# Patient Record
Sex: MALE | Race: WHITE | Employment: OTHER | URBAN - NONMETROPOLITAN AREA
[De-identification: names, ages, dates, MRNs, and addresses within clinical notes are randomized per-mention and may not be internally consistent; named-entity substitution may affect disease eponyms.]

---

## 2017-11-10 ENCOUNTER — HOSPITAL ENCOUNTER (OUTPATIENT)
Dept: NUCLEAR MEDICINE | Age: 62
Discharge: HOME | End: 2017-11-10
Payer: COMMERCIAL

## 2017-11-10 VITALS — WEIGHT: 235 LBS

## 2017-11-10 DIAGNOSIS — R10.11 ABDOMINAL PAIN, RIGHT UPPER QUADRANT: ICD-10-CM

## 2017-11-10 PROCEDURE — A9537 TC99M MEBROFENIN: HCPCS | Performed by: NURSE PRACTITIONER

## 2017-11-10 PROCEDURE — 2580000003 HC RX 258: Performed by: RADIOLOGY

## 2017-11-10 PROCEDURE — 6360000004 HC RX CONTRAST MEDICATION: Performed by: RADIOLOGY

## 2017-11-10 PROCEDURE — 3430000000 HC RX DIAGNOSTIC RADIOPHARMACEUTICAL: Performed by: NURSE PRACTITIONER

## 2017-11-10 PROCEDURE — 78227 HEPATOBIL SYST IMAGE W/DRUG: CPT

## 2017-11-10 RX ADMIN — SODIUM CHLORIDE 2.13 MCG: 9 INJECTION, SOLUTION INTRAVENOUS at 10:42

## 2017-11-10 RX ADMIN — Medication 8.6 MILLICURIE: at 09:40

## 2017-12-04 ENCOUNTER — OFFICE VISIT (OUTPATIENT)
Dept: SURGERY | Age: 62
End: 2017-12-04
Payer: COMMERCIAL

## 2017-12-04 VITALS
OXYGEN SATURATION: 95 % | HEART RATE: 92 BPM | DIASTOLIC BLOOD PRESSURE: 76 MMHG | HEIGHT: 71 IN | SYSTOLIC BLOOD PRESSURE: 120 MMHG | BODY MASS INDEX: 33.32 KG/M2 | WEIGHT: 238 LBS | TEMPERATURE: 95.4 F | RESPIRATION RATE: 18 BRPM

## 2017-12-04 DIAGNOSIS — K80.10 CALCULUS OF GALLBLADDER WITH CHRONIC CHOLECYSTITIS WITHOUT OBSTRUCTION: ICD-10-CM

## 2017-12-04 DIAGNOSIS — E83.110 HEREDITARY HEMOCHROMATOSIS (HCC): ICD-10-CM

## 2017-12-04 DIAGNOSIS — R94.8 ABNORMAL BILIARY HIDA SCAN: ICD-10-CM

## 2017-12-04 PROCEDURE — 3017F COLORECTAL CA SCREEN DOC REV: CPT | Performed by: SURGERY

## 2017-12-04 PROCEDURE — G8484 FLU IMMUNIZE NO ADMIN: HCPCS | Performed by: SURGERY

## 2017-12-04 PROCEDURE — G8419 CALC BMI OUT NRM PARAM NOF/U: HCPCS | Performed by: SURGERY

## 2017-12-04 PROCEDURE — G8427 DOCREV CUR MEDS BY ELIG CLIN: HCPCS | Performed by: SURGERY

## 2017-12-04 PROCEDURE — 99202 OFFICE O/P NEW SF 15 MIN: CPT | Performed by: SURGERY

## 2017-12-04 PROCEDURE — 1036F TOBACCO NON-USER: CPT | Performed by: SURGERY

## 2017-12-04 RX ORDER — CETIRIZINE HYDROCHLORIDE 10 MG/1
10 TABLET ORAL DAILY
COMMUNITY

## 2017-12-04 RX ORDER — ALLOPURINOL 300 MG/1
300 TABLET ORAL DAILY
COMMUNITY

## 2017-12-04 RX ORDER — MONTELUKAST SODIUM 10 MG/1
10 TABLET ORAL NIGHTLY
COMMUNITY

## 2017-12-04 RX ORDER — LISINOPRIL 10 MG/1
10 TABLET ORAL DAILY
Status: ON HOLD | COMMUNITY
End: 2021-03-27 | Stop reason: HOSPADM

## 2017-12-04 RX ORDER — PANTOPRAZOLE SODIUM 40 MG/1
40 GRANULE, DELAYED RELEASE ORAL
Status: ON HOLD | COMMUNITY
End: 2020-08-16 | Stop reason: HOSPADM

## 2017-12-04 ASSESSMENT — ENCOUNTER SYMPTOMS
COLOR CHANGE: 0
BACK PAIN: 0
ABDOMINAL PAIN: 1
EYE REDNESS: 0
COUGH: 0
CHOKING: 0
NAUSEA: 0
CHEST TIGHTNESS: 0
RECTAL PAIN: 0
EYE PAIN: 0
CONSTIPATION: 0
VOICE CHANGE: 0
EYE ITCHING: 0
APNEA: 0
VOMITING: 0
RHINORRHEA: 0
SHORTNESS OF BREATH: 1
BLOOD IN STOOL: 0
STRIDOR: 0
PHOTOPHOBIA: 0
SINUS PAIN: 0
DIARRHEA: 1
EYE DISCHARGE: 0
SORE THROAT: 0
FACIAL SWELLING: 0
TROUBLE SWALLOWING: 0
WHEEZING: 0
ABDOMINAL DISTENTION: 1
ANAL BLEEDING: 0
SINUS PRESSURE: 0

## 2017-12-04 NOTE — PROGRESS NOTES
Aura Galvan MD [facility]  General Surgery  New Patient Evaluation in Office  Pt Name: Rachel Nur  Date of Birth 1955   Today's Date: 12/4/2017  Medical Record Number: [id number]  Referring Provider: Memo Nur NP  Primary Care Provider: Torin Izaguirre  Chief Complaint:  Chief Complaint   Patient presents with   [facility] Surgical Consult     new patient--ref by Joanna Valles for cholelithiasis     ASSESSMENT      Problem List Items Addressed This Visit     Hereditary hemochromatosis ([address]    Calculus of gallbladder with chronic cholecystitis without obstruction    Abnormal biliary HIDA scan      Other Visit Diagnoses    None. Past Medical History:   Diagnosis Date    Diabetes ([address]     GERD (gastroesophageal reflux disease)     Gout     Hyperlipidemia     Hypertension     Seasonal allergies     Sleep apnea           PLANS      1. Schedule Gilma Newton for Nothing at this time  2. His symptoms don't sound like gallbladder  3. In reviewing Dr. Robison note after his EGD he did not think the symptoms were consistent with gallbladder and made some changes to his diet. 4. During the patient's HIDA scan he had no symptoms at all during the ejection fraction portion so no symptoms reproduced during HIDA ejection fraction  5. The patient does relate that he thinks his symptoms are slightly better since he gave up drinking diet Pepsi  Orders Placed This Encounter:  No orders of the defined types were placed in this encounter. Orville Richmond is a 57y.o. year old male seen regarding gallbladder. He is referred by Dr. Inocencio Maria for evaluation of cholelithiasis. He's had a long history of elevated liver enzymes dating back to at least 2008. He's had multiple lab testing for etiologies which were all negative for hepatitis and other problems. He does have a fairly significant drinking history. Lithotomy be from his Lipitor back then so that was stopped.   He had a gallbladder ultrasound in June 2017 which revealed cholelithiasis without evidence of cholecystitis. He was complaining of some abdominal pain this, dull achy sensation about 3-4 on a pain scale. The location as the epigastric and right upper quadrant. He'll have days when it's bad and other days when it is not so bad. He does think to eating out at restaurants and foods high in fat especially sood may make it worse. He does have some associated bloating and a little bit more diarrhea. Denies vomiting. He had a HIDA scan done which showed his ejection fraction was 23% he has also been diagnosed with hemachromatosis by Dr. Irma Chawla in [place]. He did undergo an EGD recently which showed mild gastroesophageal reflux disease and gastritis and the impression was they felt he was having functional dyspepsia and at that time they talk to him about they didn't think his symptoms were from his gallbladder even though he had gallstones and a low ejection fraction. There is a him on Protonix and have him cut down his caffeine and change his diet and then reevaluate him in the office in 2 months. They felt that the did change my help in the most from the symptoms he is complaining of. He also had a colonoscopy at that time. LIVER ULTRASOUND WITH DOPPLER ANALYSIS       CLINICAL INFORMATION: Elevated liver enzymes       TECHNIQUE: Multiple sonographic images of the upper abdomen were obtained with specific attention given to the liver. A few images of the gallbladder, pancreas, and right kidney were also obtained. Color Doppler imaging of the main vessels in both lower    performed along with Spectral Doppler analysis.       FINDINGS:        Liver - 19.4 x 16.5 x 16.6 cm   Gallbladder - 7.7 x 3.9 x 2.5 cm   Gallbladder Wall - 0.2 cm   Common Duct - 0.6 cm   Cunningham's Sign: neg       Liver: The liver is normal in size, contour, and echogenicity. No lesions are seen. No abnormally dilated bile ducts are seen.  Color Doppler imaging of the major vessels in discharge, redness, itching and visual disturbance. Respiratory: Positive for shortness of breath. Negative for apnea, cough, choking, chest tightness, wheezing and stridor. Cardiovascular: Negative for chest pain, palpitations and leg swelling. Gastrointestinal: Positive for abdominal distention, abdominal pain and diarrhea. Negative for anal bleeding, blood in stool, constipation, nausea, rectal pain and vomiting. Endocrine: Negative for cold intolerance, heat intolerance, polydipsia, polyphagia and polyuria. Genitourinary: Negative for decreased urine volume, difficulty urinating, discharge, dysuria, enuresis, flank pain, frequency, genital sores, hematuria, penile pain, penile swelling, scrotal swelling, testicular pain and urgency. Musculoskeletal: Negative for arthralgias, back pain, gait problem, joint swelling, myalgias, neck pain and neck stiffness. Skin: Negative for color change, pallor, rash and wound. Allergic/Immunologic: Negative for environmental allergies, food allergies and immunocompromised state. Neurological: Negative for dizziness, tremors, seizures, syncope, facial asymmetry, speech difficulty, weakness, light-headedness, numbness and headaches. Hematological: Negative for adenopathy. Does not bruise/bleed easily. Psychiatric/Behavioral: Negative for agitation, behavioral problems, confusion, decreased concentration, dysphoric mood, hallucinations, self-injury, sleep disturbance and suicidal ideas. The patient is not nervous/anxious and is not hyperactive    OBJECTIVE      VITALS:  height is 5' 11\" (1.803 m) and weight is 238 lb (108 kg). His tympanic temperature is 95.4 °F (35.2 °C). His blood pressure is 120/76 and his pulse is 92. His respiration is 18 and oxygen saturation is 95%. CONSTITUTIONAL: Alert and oriented times 3, no acute distress and cooperative to examination with proper mood and affect.              Electronically signed by Yudy Chong MD on

## 2017-12-04 NOTE — PROGRESS NOTES
Subjective:      Patient ID: Ivette Molina is a 57 y.o. male. Chief Complaint   Patient presents with    Surgical Consult     new patient--ref by Ethan Holguin for cholelithiasis       HPI    Review of Systems   Constitutional: Negative for activity change, appetite change, chills, diaphoresis, fatigue, fever and unexpected weight change. HENT: Negative for congestion, dental problem, drooling, ear discharge, ear pain, facial swelling, hearing loss, mouth sores, nosebleeds, postnasal drip, rhinorrhea, sinus pain, sinus pressure, sneezing, sore throat, tinnitus, trouble swallowing and voice change. Eyes: Negative for photophobia, pain, discharge, redness, itching and visual disturbance. Respiratory: Positive for shortness of breath. Negative for apnea, cough, choking, chest tightness, wheezing and stridor. Cardiovascular: Negative for chest pain, palpitations and leg swelling. Gastrointestinal: Positive for abdominal distention, abdominal pain and diarrhea. Negative for anal bleeding, blood in stool, constipation, nausea, rectal pain and vomiting. Endocrine: Negative for cold intolerance, heat intolerance, polydipsia, polyphagia and polyuria. Genitourinary: Negative for decreased urine volume, difficulty urinating, discharge, dysuria, enuresis, flank pain, frequency, genital sores, hematuria, penile pain, penile swelling, scrotal swelling, testicular pain and urgency. Musculoskeletal: Negative for arthralgias, back pain, gait problem, joint swelling, myalgias, neck pain and neck stiffness. Skin: Negative for color change, pallor, rash and wound. Allergic/Immunologic: Negative for environmental allergies, food allergies and immunocompromised state. Neurological: Negative for dizziness, tremors, seizures, syncope, facial asymmetry, speech difficulty, weakness, light-headedness, numbness and headaches. Hematological: Negative for adenopathy. Does not bruise/bleed easily.

## 2017-12-04 NOTE — LETTER
.    [facility]  Lucrecia Barron MD FACS  Phone- [phone]  Fax [phone]    Pt Name: Chris Ragsdale Record Number: [id number]  Date of Birth 1955   Today's Date: 12/4/2017    Deng Grimaldo and Marcelle Chiang was evaluated in the office today. My assessment and plans are listed below. Assessment:     Dae Laguerre was seen today for surgical consult. Diagnoses and all orders for this visit:    Hereditary hemochromatosis ([address]    Calculus of gallbladder with chronic cholecystitis without obstruction    Abnormal biliary HIDA scan       Plan:     1. Schedule Dae Laguerre for Nothing at this time  2. His symptoms don't sound like gallbladder  3. In reviewing Dr. Timbo Oro note after his EGD he did not think the symptoms were consistent with gallbladder and made some changes to his diet. 4. During the patient's HIDA scan he had no symptoms at all during the ejection fraction portion so no symptoms reproduced during HIDA ejection fraction  5. The patient does relate that he thinks his symptoms are slightly better since he gave up drinking diet Pepsi  If I can provide any additional assistance or you have any concerns, please feel free to contact me. Thank you for allowing to participate in the care of your patients. Sincerely,      Lucrecia Barron MD FACS  [id number]  [facility], [address]  Office: [phone]  Fax: [phone]

## 2018-03-26 ENCOUNTER — HOSPITAL ENCOUNTER (OUTPATIENT)
Dept: CT IMAGING | Age: 63
Discharge: HOME | End: 2018-03-26
Payer: COMMERCIAL

## 2018-03-26 DIAGNOSIS — R94.8 NONSPECIFIC ABNORMAL RESULTS OF BASAL METABOLISM FUNCTION STUDY: ICD-10-CM

## 2018-03-26 DIAGNOSIS — R10.11 ABDOMINAL PAIN, RIGHT UPPER QUADRANT: ICD-10-CM

## 2018-03-26 DIAGNOSIS — R94.5 ABNORMAL RESULTS OF LIVER FUNCTION STUDIES: ICD-10-CM

## 2018-03-26 DIAGNOSIS — E83.118: ICD-10-CM

## 2018-03-26 DIAGNOSIS — K80.60 CALCULUS OF GALLBLADDER AND BILE DUCT WITH CHOLECYSTITIS WITHOUT OBSTRUCTION, UNSPECIFIED CHOLECYSTITIS ACUITY: ICD-10-CM

## 2018-03-26 DIAGNOSIS — R79.89 HYPOURICEMIA: ICD-10-CM

## 2018-03-26 PROCEDURE — 74177 CT ABD & PELVIS W/CONTRAST: CPT

## 2018-03-26 PROCEDURE — 6360000004 HC RX CONTRAST MEDICATION: Performed by: INTERNAL MEDICINE

## 2018-03-26 RX ADMIN — IOHEXOL 50 ML: 240 INJECTION, SOLUTION INTRATHECAL; INTRAVASCULAR; INTRAVENOUS; ORAL at 09:19

## 2018-03-26 RX ADMIN — IOPAMIDOL 100 ML: 755 INJECTION, SOLUTION INTRAVENOUS at 09:19

## 2020-07-28 ENCOUNTER — HOSPITAL ENCOUNTER (OUTPATIENT)
Age: 65
Discharge: HOME | End: 2020-07-28
Payer: MEDICARE

## 2020-07-28 LAB
ALBUMIN SERPL-MCNC: 4.1 G/DL (ref 3.5–5.1)
ALP BLD-CCNC: 114 U/L (ref 38–126)
ALT SERPL-CCNC: 79 U/L (ref 11–66)
AST SERPL-CCNC: 94 U/L (ref 5–40)
BILIRUB SERPL-MCNC: 0.6 MG/DL (ref 0.3–1.2)
BILIRUBIN DIRECT: < 0.2 MG/DL (ref 0–0.3)
HBV SURFACE AB TITR SER: NEGATIVE {TITER}
HEPATITIS B SURFACE ANTIGEN: NEGATIVE
TOTAL PROTEIN: 7.1 G/DL (ref 6.1–8)

## 2020-07-28 PROCEDURE — 80076 HEPATIC FUNCTION PANEL: CPT

## 2020-07-28 PROCEDURE — 82784 ASSAY IGA/IGD/IGG/IGM EACH: CPT

## 2020-07-28 PROCEDURE — 36415 COLL VENOUS BLD VENIPUNCTURE: CPT

## 2020-07-28 PROCEDURE — 87340 HEPATITIS B SURFACE AG IA: CPT

## 2020-07-28 PROCEDURE — 86707 HEPATITIS BE ANTIBODY: CPT

## 2020-07-28 PROCEDURE — 86704 HEP B CORE ANTIBODY TOTAL: CPT

## 2020-07-28 PROCEDURE — 86706 HEP B SURFACE ANTIBODY: CPT

## 2020-07-28 PROCEDURE — 83516 IMMUNOASSAY NONANTIBODY: CPT

## 2020-07-28 PROCEDURE — 86708 HEPATITIS A ANTIBODY: CPT

## 2020-07-29 LAB — IGA: 543 MG/DL (ref 70–400)

## 2020-07-30 ENCOUNTER — HOSPITAL ENCOUNTER (OUTPATIENT)
Dept: CT IMAGING | Age: 65
Discharge: HOME | End: 2020-07-30
Payer: MEDICARE

## 2020-07-30 LAB
CREATININE, WHOLE BLOOD: 0.8 MG/DL (ref 0.5–1.2)
ESTIMATED GFR, PCACC: > 90 ML/MIN/1.73M2
GLIADIN PEPTIDE IGA: 2.3 U/ML
GLIADIN PEPTIDE IGG: 0.9 U/ML

## 2020-07-30 PROCEDURE — 74160 CT ABDOMEN W/CONTRAST: CPT

## 2020-07-30 PROCEDURE — 6360000004 HC RX CONTRAST MEDICATION: Performed by: NURSE PRACTITIONER

## 2020-07-30 PROCEDURE — 82565 ASSAY OF CREATININE: CPT

## 2020-07-30 RX ADMIN — IOPAMIDOL 100 ML: 755 INJECTION, SOLUTION INTRAVENOUS at 16:41

## 2020-07-30 RX ADMIN — IOHEXOL 50 ML: 240 INJECTION, SOLUTION INTRATHECAL; INTRAVASCULAR; INTRAVENOUS; ORAL at 16:41

## 2020-07-31 LAB
F-ACTIN AB IGG: 11 UNITS (ref 0–19)
HAV AB SERPL IA-ACNC: NEGATIVE
HEPATITIS B CORE TOTAL ANTIBODY: NEGATIVE
HEPATITIS BE ANTIBODY: NEGATIVE
MITOCHONDRIAL ANTIBODY: 17.7 UNITS (ref 0–20)
TISSUE TRANSGLUTAMINASE ANTIBODY: 2 U/ML (ref 0–5)
TISSUE TRANSGLUTAMINASE IGA: 2 U/ML (ref 0–3)

## 2020-08-11 ENCOUNTER — APPOINTMENT (OUTPATIENT)
Dept: GENERAL RADIOLOGY | Age: 65
DRG: 432 | End: 2020-08-11
Payer: MEDICARE

## 2020-08-11 ENCOUNTER — HOSPITAL ENCOUNTER (INPATIENT)
Age: 65
LOS: 5 days | Discharge: HOME | DRG: 432 | End: 2020-08-16
Attending: EMERGENCY MEDICINE | Admitting: FAMILY MEDICINE
Payer: MEDICARE

## 2020-08-11 PROBLEM — K92.2 GI HEMORRHAGE: Status: ACTIVE | Noted: 2020-08-11

## 2020-08-11 LAB
ABO: NORMAL
ALBUMIN SERPL-MCNC: 3.1 GM/DL (ref 3.4–5)
ALP BLD-CCNC: 94 U/L (ref 46–116)
ALT SERPL-CCNC: 70 U/L (ref 14–63)
ANION GAP: 14 MEQ/L (ref 8–16)
ANTIBODY SCREEN: NORMAL
APTT: 28.5 SECONDS (ref 22–38)
AST SERPL-CCNC: 50 U/L (ref 15–37)
BASOPHILS # BLD: 0.4 % (ref 0–3)
BILIRUB SERPL-MCNC: 1.1 MG/DL (ref 0.2–1)
BUN BLDV-MCNC: 25 MG/DL (ref 7–18)
CHLORIDE BLD-SCNC: 106 MEQ/L (ref 98–107)
CO2: 22 MEQ/L (ref 21–32)
CREAT SERPL-MCNC: 1.2 MG/DL (ref 0.6–1.3)
EKG ATRIAL RATE: 127 BPM
EKG P AXIS: 46 DEGREES
EKG P-R INTERVAL: 158 MS
EKG Q-T INTERVAL: 304 MS
EKG QRS DURATION: 86 MS
EKG QTC CALCULATION (BAZETT): 441 MS
EKG R AXIS: 9 DEGREES
EKG T AXIS: 57 DEGREES
EKG VENTRICULAR RATE: 127 BPM
EOSINOPHILS RELATIVE PERCENT: 1.3 % (ref 0–4)
GFR, ESTIMATED: 64 ML/MIN/1.73M2
GLUCOSE BLD-MCNC: 230 MG/DL (ref 74–106)
HCT VFR BLD CALC: 29.9 % (ref 42–52)
HCT VFR BLD CALC: 38.4 % (ref 42–52)
HEMOCCULT STL QL: POSITIVE
HEMOGLOBIN: 12.9 GM/DL (ref 14–18)
HEMOGLOBIN: 9.2 GM/DL (ref 14–18)
INR BLD: 1.29 (ref 0.85–1.13)
LYMPHOCYTES # BLD: 13.3 % (ref 15–47)
MAGNESIUM: 1.7 MG/DL (ref 1.8–2.4)
MCH RBC QN AUTO: 32.3 PG (ref 27–31)
MCHC RBC AUTO-ENTMCNC: 33.7 GM/DL (ref 33–37)
MCV RBC AUTO: 96.1 FL (ref 80–94)
MONOCYTES: 7.9 % (ref 0–12)
MRSA SCREEN RT-PCR: NEGATIVE
PDW BLD-RTO: 14.6 % (ref 11.5–14.5)
PLATELET # BLD: 133 THOU/MM3 (ref 130–400)
PMV BLD AUTO: 9.1 FL (ref 7.4–10.4)
POC CALCIUM: 8.8 MG/DL (ref 8.5–10.1)
POTASSIUM SERPL-SCNC: 4.7 MEQ/L (ref 3.5–5.1)
RBC # BLD: 4 MILL/MM3 (ref 4.7–6.1)
RH FACTOR: NORMAL
SEGS: 77.1 % (ref 43–75)
SODIUM BLD-SCNC: 142 MEQ/L (ref 136–145)
TOTAL PROTEIN: 6.3 GM/DL (ref 6.4–8.2)
TROPONIN I: < 0.017 NG/ML (ref 0.02–0.05)
VANCOMYCIN RESISTANT ENTEROCOCCUS: NEGATIVE
WBC # BLD: 10.2 THOU/MM3 (ref 4.8–10.8)

## 2020-08-11 PROCEDURE — 2720000010 HC SURG SUPPLY STERILE: Performed by: INTERNAL MEDICINE

## 2020-08-11 PROCEDURE — 6360000002 HC RX W HCPCS: Performed by: PHYSICIAN ASSISTANT

## 2020-08-11 PROCEDURE — C9113 INJ PANTOPRAZOLE SODIUM, VIA: HCPCS | Performed by: EMERGENCY MEDICINE

## 2020-08-11 PROCEDURE — 99223 1ST HOSP IP/OBS HIGH 75: CPT | Performed by: INTERNAL MEDICINE

## 2020-08-11 PROCEDURE — 85610 PROTHROMBIN TIME: CPT

## 2020-08-11 PROCEDURE — 02HV33Z INSERTION OF INFUSION DEVICE INTO SUPERIOR VENA CAVA, PERCUTANEOUS APPROACH: ICD-10-PCS | Performed by: INTERNAL MEDICINE

## 2020-08-11 PROCEDURE — 99223 1ST HOSP IP/OBS HIGH 75: CPT | Performed by: PHYSICIAN ASSISTANT

## 2020-08-11 PROCEDURE — 36430 TRANSFUSION BLD/BLD COMPNT: CPT

## 2020-08-11 PROCEDURE — 94761 N-INVAS EAR/PLS OXIMETRY MLT: CPT

## 2020-08-11 PROCEDURE — 36556 INSERT NON-TUNNEL CV CATH: CPT | Performed by: NURSE PRACTITIONER

## 2020-08-11 PROCEDURE — 2709999900 HC NON-CHARGEABLE SUPPLY: Performed by: INTERNAL MEDICINE

## 2020-08-11 PROCEDURE — 36415 COLL VENOUS BLD VENIPUNCTURE: CPT

## 2020-08-11 PROCEDURE — 84484 ASSAY OF TROPONIN QUANT: CPT

## 2020-08-11 PROCEDURE — 87081 CULTURE SCREEN ONLY: CPT

## 2020-08-11 PROCEDURE — 3609013000 HC EGD TRANSORAL CONTROL BLEEDING ANY METHOD: Performed by: INTERNAL MEDICINE

## 2020-08-11 PROCEDURE — 87500 VANOMYCIN DNA AMP PROBE: CPT

## 2020-08-11 PROCEDURE — 2500000003 HC RX 250 WO HCPCS

## 2020-08-11 PROCEDURE — 71045 X-RAY EXAM CHEST 1 VIEW: CPT

## 2020-08-11 PROCEDURE — 6360000002 HC RX W HCPCS: Performed by: NURSE PRACTITIONER

## 2020-08-11 PROCEDURE — 85014 HEMATOCRIT: CPT

## 2020-08-11 PROCEDURE — 2000000000 HC ICU R&B

## 2020-08-11 PROCEDURE — 36556 INSERT NON-TUNNEL CV CATH: CPT

## 2020-08-11 PROCEDURE — 2580000003 HC RX 258: Performed by: EMERGENCY MEDICINE

## 2020-08-11 PROCEDURE — 6360000002 HC RX W HCPCS: Performed by: INTERNAL MEDICINE

## 2020-08-11 PROCEDURE — 93005 ELECTROCARDIOGRAM TRACING: CPT | Performed by: EMERGENCY MEDICINE

## 2020-08-11 PROCEDURE — 2580000003 HC RX 258: Performed by: PHYSICIAN ASSISTANT

## 2020-08-11 PROCEDURE — 5A1945Z RESPIRATORY VENTILATION, 24-96 CONSECUTIVE HOURS: ICD-10-PCS | Performed by: INTERNAL MEDICINE

## 2020-08-11 PROCEDURE — 86850 RBC ANTIBODY SCREEN: CPT

## 2020-08-11 PROCEDURE — 87086 URINE CULTURE/COLONY COUNT: CPT

## 2020-08-11 PROCEDURE — 85730 THROMBOPLASTIN TIME PARTIAL: CPT

## 2020-08-11 PROCEDURE — 85025 COMPLETE CBC W/AUTO DIFF WBC: CPT

## 2020-08-11 PROCEDURE — C9113 INJ PANTOPRAZOLE SODIUM, VIA: HCPCS | Performed by: PHYSICIAN ASSISTANT

## 2020-08-11 PROCEDURE — 31500 INSERT EMERGENCY AIRWAY: CPT

## 2020-08-11 PROCEDURE — 87641 MR-STAPH DNA AMP PROBE: CPT

## 2020-08-11 PROCEDURE — 83735 ASSAY OF MAGNESIUM: CPT

## 2020-08-11 PROCEDURE — 86923 COMPATIBILITY TEST ELECTRIC: CPT

## 2020-08-11 PROCEDURE — 2500000003 HC RX 250 WO HCPCS: Performed by: NURSE PRACTITIONER

## 2020-08-11 PROCEDURE — P9016 RBC LEUKOCYTES REDUCED: HCPCS

## 2020-08-11 PROCEDURE — 0W3P8ZZ CONTROL BLEEDING IN GASTROINTESTINAL TRACT, VIA NATURAL OR ARTIFICIAL OPENING ENDOSCOPIC: ICD-10-PCS | Performed by: INTERNAL MEDICINE

## 2020-08-11 PROCEDURE — 86900 BLOOD TYPING SEROLOGIC ABO: CPT

## 2020-08-11 PROCEDURE — 2580000003 HC RX 258: Performed by: INTERNAL MEDICINE

## 2020-08-11 PROCEDURE — 85018 HEMOGLOBIN: CPT

## 2020-08-11 PROCEDURE — 82272 OCCULT BLD FECES 1-3 TESTS: CPT

## 2020-08-11 PROCEDURE — 2580000003 HC RX 258: Performed by: NURSE PRACTITIONER

## 2020-08-11 PROCEDURE — 96374 THER/PROPH/DIAG INJ IV PUSH: CPT

## 2020-08-11 PROCEDURE — 86901 BLOOD TYPING SEROLOGIC RH(D): CPT

## 2020-08-11 PROCEDURE — 96375 TX/PRO/DX INJ NEW DRUG ADDON: CPT

## 2020-08-11 PROCEDURE — 80053 COMPREHEN METABOLIC PANEL: CPT

## 2020-08-11 PROCEDURE — 94002 VENT MGMT INPAT INIT DAY: CPT

## 2020-08-11 PROCEDURE — 0BH17EZ INSERTION OF ENDOTRACHEAL AIRWAY INTO TRACHEA, VIA NATURAL OR ARTIFICIAL OPENING: ICD-10-PCS | Performed by: INTERNAL MEDICINE

## 2020-08-11 PROCEDURE — 31500 INSERT EMERGENCY AIRWAY: CPT | Performed by: NURSE PRACTITIONER

## 2020-08-11 PROCEDURE — 2709999900 HC NON-CHARGEABLE SUPPLY

## 2020-08-11 PROCEDURE — 6360000002 HC RX W HCPCS: Performed by: EMERGENCY MEDICINE

## 2020-08-11 PROCEDURE — 99283 EMERGENCY DEPT VISIT LOW MDM: CPT

## 2020-08-11 PROCEDURE — 6360000002 HC RX W HCPCS

## 2020-08-11 RX ORDER — ONDANSETRON 2 MG/ML
8 INJECTION INTRAMUSCULAR; INTRAVENOUS ONCE
Status: COMPLETED | OUTPATIENT
Start: 2020-08-11 | End: 2020-08-11

## 2020-08-11 RX ORDER — SODIUM CHLORIDE 0.9 % (FLUSH) 0.9 %
10 SYRINGE (ML) INJECTION PRN
Status: DISCONTINUED | OUTPATIENT
Start: 2020-08-11 | End: 2020-08-16 | Stop reason: HOSPADM

## 2020-08-11 RX ORDER — PANTOPRAZOLE SODIUM 40 MG/10ML
40 INJECTION, POWDER, LYOPHILIZED, FOR SOLUTION INTRAVENOUS DAILY
Status: DISCONTINUED | OUTPATIENT
Start: 2020-08-14 | End: 2020-08-11

## 2020-08-11 RX ORDER — PANTOPRAZOLE SODIUM 40 MG/10ML
40 INJECTION, POWDER, LYOPHILIZED, FOR SOLUTION INTRAVENOUS DAILY
Status: DISCONTINUED | OUTPATIENT
Start: 2020-08-11 | End: 2020-08-11

## 2020-08-11 RX ORDER — DEXTROSE MONOHYDRATE 25 G/50ML
12.5 INJECTION, SOLUTION INTRAVENOUS PRN
Status: DISCONTINUED | OUTPATIENT
Start: 2020-08-11 | End: 2020-08-16 | Stop reason: HOSPADM

## 2020-08-11 RX ORDER — DEXTROSE MONOHYDRATE 50 MG/ML
100 INJECTION, SOLUTION INTRAVENOUS PRN
Status: DISCONTINUED | OUTPATIENT
Start: 2020-08-11 | End: 2020-08-16 | Stop reason: HOSPADM

## 2020-08-11 RX ORDER — SODIUM CHLORIDE 9 MG/ML
10 INJECTION INTRAVENOUS DAILY
Status: DISCONTINUED | OUTPATIENT
Start: 2020-08-14 | End: 2020-08-11 | Stop reason: ALTCHOICE

## 2020-08-11 RX ORDER — LISINOPRIL 10 MG/1
10 TABLET ORAL DAILY
Status: DISCONTINUED | OUTPATIENT
Start: 2020-08-12 | End: 2020-08-16 | Stop reason: HOSPADM

## 2020-08-11 RX ORDER — PROMETHAZINE HYDROCHLORIDE 25 MG/1
12.5 TABLET ORAL EVERY 6 HOURS PRN
Status: DISCONTINUED | OUTPATIENT
Start: 2020-08-11 | End: 2020-08-16 | Stop reason: HOSPADM

## 2020-08-11 RX ORDER — 0.9 % SODIUM CHLORIDE 0.9 %
20 INTRAVENOUS SOLUTION INTRAVENOUS ONCE
Status: COMPLETED | OUTPATIENT
Start: 2020-08-11 | End: 2020-08-12

## 2020-08-11 RX ORDER — ACETAMINOPHEN 650 MG/1
650 SUPPOSITORY RECTAL EVERY 6 HOURS PRN
Status: DISCONTINUED | OUTPATIENT
Start: 2020-08-11 | End: 2020-08-16 | Stop reason: HOSPADM

## 2020-08-11 RX ORDER — FENTANYL CITRATE 50 UG/ML
INJECTION, SOLUTION INTRAMUSCULAR; INTRAVENOUS
Status: COMPLETED
Start: 2020-08-11 | End: 2020-08-11

## 2020-08-11 RX ORDER — OCTREOTIDE ACETATE 50 UG/ML
50 INJECTION, SOLUTION INTRAVENOUS; SUBCUTANEOUS ONCE
Status: COMPLETED | OUTPATIENT
Start: 2020-08-11 | End: 2020-08-11

## 2020-08-11 RX ORDER — 0.9 % SODIUM CHLORIDE 0.9 %
1000 INTRAVENOUS SOLUTION INTRAVENOUS ONCE
Status: COMPLETED | OUTPATIENT
Start: 2020-08-11 | End: 2020-08-11

## 2020-08-11 RX ORDER — PROPOFOL 10 MG/ML
INJECTION, EMULSION INTRAVENOUS
Status: COMPLETED
Start: 2020-08-11 | End: 2020-08-11

## 2020-08-11 RX ORDER — CETIRIZINE HYDROCHLORIDE 10 MG/1
10 TABLET ORAL DAILY
Status: DISCONTINUED | OUTPATIENT
Start: 2020-08-12 | End: 2020-08-16 | Stop reason: HOSPADM

## 2020-08-11 RX ORDER — PROPOFOL 10 MG/ML
10 INJECTION, EMULSION INTRAVENOUS
Status: DISCONTINUED | OUTPATIENT
Start: 2020-08-11 | End: 2020-08-13

## 2020-08-11 RX ORDER — NICOTINE POLACRILEX 4 MG
15 LOZENGE BUCCAL PRN
Status: DISCONTINUED | OUTPATIENT
Start: 2020-08-11 | End: 2020-08-16 | Stop reason: HOSPADM

## 2020-08-11 RX ORDER — SODIUM CHLORIDE 9 MG/ML
INJECTION, SOLUTION INTRAVENOUS CONTINUOUS
Status: DISCONTINUED | OUTPATIENT
Start: 2020-08-11 | End: 2020-08-14

## 2020-08-11 RX ORDER — ACETAMINOPHEN 325 MG/1
650 TABLET ORAL EVERY 6 HOURS PRN
Status: DISCONTINUED | OUTPATIENT
Start: 2020-08-11 | End: 2020-08-16 | Stop reason: HOSPADM

## 2020-08-11 RX ORDER — ONDANSETRON 2 MG/ML
4 INJECTION INTRAMUSCULAR; INTRAVENOUS EVERY 6 HOURS PRN
Status: DISCONTINUED | OUTPATIENT
Start: 2020-08-11 | End: 2020-08-16 | Stop reason: HOSPADM

## 2020-08-11 RX ORDER — KETAMINE HCL IN NACL, ISO-OSM 100MG/10ML
SYRINGE (ML) INJECTION
Status: COMPLETED
Start: 2020-08-11 | End: 2020-08-11

## 2020-08-11 RX ORDER — 0.9 % SODIUM CHLORIDE 0.9 %
250 INTRAVENOUS SOLUTION INTRAVENOUS ONCE
Status: COMPLETED | OUTPATIENT
Start: 2020-08-11 | End: 2020-08-11

## 2020-08-11 RX ORDER — SODIUM CHLORIDE 0.9 % (FLUSH) 0.9 %
10 SYRINGE (ML) INJECTION EVERY 12 HOURS SCHEDULED
Status: DISCONTINUED | OUTPATIENT
Start: 2020-08-11 | End: 2020-08-16 | Stop reason: HOSPADM

## 2020-08-11 RX ORDER — DULAGLUTIDE 0.75 MG/.5ML
1.75 INJECTION, SOLUTION SUBCUTANEOUS WEEKLY
COMMUNITY
End: 2021-04-19

## 2020-08-11 RX ORDER — ALLOPURINOL 300 MG/1
300 TABLET ORAL DAILY
Status: DISCONTINUED | OUTPATIENT
Start: 2020-08-12 | End: 2020-08-16 | Stop reason: HOSPADM

## 2020-08-11 RX ORDER — ATORVASTATIN CALCIUM 10 MG/1
10 TABLET, FILM COATED ORAL NIGHTLY
COMMUNITY

## 2020-08-11 RX ORDER — MONTELUKAST SODIUM 10 MG/1
10 TABLET ORAL NIGHTLY
Status: DISCONTINUED | OUTPATIENT
Start: 2020-08-11 | End: 2020-08-16 | Stop reason: HOSPADM

## 2020-08-11 RX ADMIN — OCTREOTIDE ACETATE 50 MCG/HR: 500 INJECTION, SOLUTION INTRAVENOUS; SUBCUTANEOUS at 23:30

## 2020-08-11 RX ADMIN — SODIUM CHLORIDE 250 ML: 9 INJECTION, SOLUTION INTRAVENOUS at 23:15

## 2020-08-11 RX ADMIN — FENTANYL CITRATE 50 MCG: 50 INJECTION, SOLUTION INTRAMUSCULAR; INTRAVENOUS at 21:46

## 2020-08-11 RX ADMIN — Medication 100 MG: at 21:13

## 2020-08-11 RX ADMIN — Medication 2 MCG/KG/MIN: at 23:00

## 2020-08-11 RX ADMIN — PROPOFOL 50 MCG/KG/MIN: 10 INJECTION, EMULSION INTRAVENOUS at 22:12

## 2020-08-11 RX ADMIN — ONDANSETRON 8 MG: 2 INJECTION INTRAMUSCULAR; INTRAVENOUS at 15:56

## 2020-08-11 RX ADMIN — PANTOPRAZOLE SODIUM 40 MG: 40 INJECTION, POWDER, FOR SOLUTION INTRAVENOUS at 15:56

## 2020-08-11 RX ADMIN — FENTANYL CITRATE 50 MCG: 50 INJECTION, SOLUTION INTRAMUSCULAR; INTRAVENOUS at 21:38

## 2020-08-11 RX ADMIN — SODIUM CHLORIDE 1000 ML: 9 INJECTION, SOLUTION INTRAVENOUS at 22:14

## 2020-08-11 RX ADMIN — SODIUM CHLORIDE 1000 ML: 9 INJECTION, SOLUTION INTRAVENOUS at 15:50

## 2020-08-11 RX ADMIN — OCTREOTIDE ACETATE 50 MCG: 50 INJECTION, SOLUTION INTRAVENOUS; SUBCUTANEOUS at 23:30

## 2020-08-11 RX ADMIN — SODIUM CHLORIDE: 9 INJECTION, SOLUTION INTRAVENOUS at 23:29

## 2020-08-11 RX ADMIN — SODIUM CHLORIDE 8 MG/HR: 9 INJECTION, SOLUTION INTRAVENOUS at 22:02

## 2020-08-11 RX ADMIN — PROPOFOL 40 MCG/KG/MIN: 10 INJECTION, EMULSION INTRAVENOUS at 23:35

## 2020-08-11 RX ADMIN — Medication 75 MCG/HR: at 22:10

## 2020-08-11 RX ADMIN — SODIUM CHLORIDE 80 MG: 9 INJECTION, SOLUTION INTRAVENOUS at 20:22

## 2020-08-11 RX ADMIN — SODIUM CHLORIDE, PRESERVATIVE FREE 10 ML: 5 INJECTION INTRAVENOUS at 23:43

## 2020-08-11 RX ADMIN — SODIUM CHLORIDE 20 ML: 9 INJECTION, SOLUTION INTRAVENOUS at 23:15

## 2020-08-11 ASSESSMENT — ENCOUNTER SYMPTOMS
COUGH: 0
VOMITING: 1
SHORTNESS OF BREATH: 0
NAUSEA: 1
WHEEZING: 0
ABDOMINAL PAIN: 0
HEARTBURN: 0

## 2020-08-11 ASSESSMENT — PAIN SCALES - GENERAL
PAINLEVEL_OUTOF10: 0
PAINLEVEL_OUTOF10: 0

## 2020-08-11 ASSESSMENT — PULMONARY FUNCTION TESTS: PIF_VALUE: 20

## 2020-08-11 NOTE — PROGRESS NOTES
Transfer  Report from  [address]  Referring Physician   Dr. Jacob Colindres  Accepting Physician  Dr. Brian Samayoa  Patient Condition:     Joshua Gale  1955 is patient of Dr. Ronald Solis at [facility]. Patient was weak and dizzy yesterday, today 2 black tarry stools and 1 episode of vomiting blood. Stool occult positive. INR 1.29, Trop -, BUN 25, Vitals 97.8, 127, 18, 132/67 after fluids now 103/56, 98% RA. Dr Jacob Colindres recommended Stepdown, she felt the HMG was erroneously high.  Admit, Inpatient, Stepdown, GI Bleed

## 2020-08-11 NOTE — ED TRIAGE NOTES
Patient into ED with concerns of 1 episodes of throwing up blood and 2 black stools today. Patient stated stopped taking Protonix 4 days ago. Denies abdominal pain.

## 2020-08-11 NOTE — ED PROVIDER NOTES
[facility]  eMERGENCY dEPARTMENT eNCOUnter             [address]    CHIEF COMPLAINT      I am vomiting blood. Nurses Notes reviewed and I agree except as noted in the HPI. HPI    Jennifer Chino is a 75 y.o. male who presents stating that since yesterday, he has felt weak, lightheaded, and mildly nauseated. Today, he had 2 black stools fairly close together. These were large and loose. Just prior to arrival, he became nauseated and vomited \"quite a bit of blood \". He still has mild nausea, but denies abdominal pain. He has never had anything like this before. He had upper and lower endoscopy \"a few years ago \", and was told that he \"just has some inflammation\". He was placed on Protonix, which he actually stopped taking 4 days ago. He has a history of hemochromatosis, and has not required phlebotomy for \"at least 3 years \". He has a hematologist in [facility]. REVIEW OF SYSTEMS      Review of Systems   Constitutional: Positive for malaise/fatigue. Negative for diaphoresis and fever. HENT: Negative. Respiratory: Negative for cough, shortness of breath and wheezing. Cardiovascular: Negative for chest pain, palpitations and leg swelling. Gastrointestinal: Positive for melena, nausea and vomiting. Negative for abdominal pain and heartburn. Genitourinary: Negative. Neurological: Positive for dizziness and weakness. Negative for focal weakness, loss of consciousness and headaches. Psychiatric/Behavioral: The patient is nervous/anxious. All other systems reviewed and are negative. PAST MEDICAL HISTORY     has a past medical history of Diabetes ([address] GERD (gastroesophageal reflux disease), Gout, Hyperlipidemia, Hypertension, Seasonal allergies, and Sleep apnea. SURGICAL HISTORY     has a past surgical history that includes Colonoscopy (04/2015); cervical fusion (2004); and Upper gastrointestinal endoscopy (11/2017).     CURRENT MEDICATIONS    Current Discharge Medication List      CONTINUE these medications which have NOT CHANGED    Details   Dulaglutide (TRULICITY) 6.47 TC/4.8YN SOPN Inject 1.75 mg into the skin once a week      lisinopril (PRINIVIL;ZESTRIL) 10 MG tablet Take 10 mg by mouth daily      metFORMIN (GLUCOPHAGE) 1000 MG tablet Take 1,000 mg by mouth 2 times daily (with meals)      montelukast (SINGULAIR) 10 MG tablet Take 10 mg by mouth nightly      pantoprazole sodium (PROTONIX) 40 MG PACK packet Take 40 mg by mouth every morning (before breakfast)      aspirin 81 MG tablet Take 81 mg by mouth daily      cetirizine (ZYRTEC) 10 MG tablet Take 10 mg by mouth daily      Misc Natural Products (OSTEO BI-FLEX ADV JOINT SHIELD PO) Take by mouth daily      Multiple Vitamins-Minerals (MULTIVITAMIN ADULT PO) Take 1 tablet by mouth daily      allopurinol (ZYLOPRIM) 300 MG tablet Take 300 mg by mouth daily             ALLERGIES    is allergic to sulfa antibiotics. FAMILY HISTORY    He indicated that his mother is . He indicated that his father is . He indicated that his brother is alive. family history includes Cancer in his father; Heart Disease in his brother and mother; [place] in his father. SOCIAL HISTORY     reports that he has quit smoking. He has never used smokeless tobacco. He reports current alcohol use. He reports that he does not use drugs. PHYSICAL EXAM       INITIAL VITALS: BP (!) 111/58   Pulse 129   Temp 98.7 °F (37.1 °C) (Oral)   Resp 18   Ht 6' (1.829 m)   Wt 226 lb 9.6 oz (102.8 kg)   SpO2 95%   BMI 30.73 kg/m²        Physical Exam  Vitals signs and nursing note reviewed. Exam conducted with a chaperone present. Constitutional:       General: He is in acute distress. Appearance: He is obese. HENT:      Nose: Nose normal.      Mouth/Throat:      Mouth: Mucous membranes are moist.      Pharynx: Oropharynx is clear. No posterior oropharyngeal erythema.    Eyes: Conjunctiva/sclera: Conjunctivae normal.      Pupils: Pupils are equal, round, and reactive to light. Neck:      Musculoskeletal: Neck supple. Cardiovascular:      Rate and Rhythm: Tachycardia present. Heart sounds: No murmur. Pulmonary:      Effort: Pulmonary effort is normal. No respiratory distress. Breath sounds: Normal breath sounds. Abdominal:      General: Bowel sounds are normal.      Palpations: Abdomen is soft. There is no mass. Tenderness: There is no abdominal tenderness. Genitourinary:     Comments: No rectal mass, smear black, heme positive stool  Musculoskeletal:         General: No swelling or tenderness. Lymphadenopathy:      Cervical: No cervical adenopathy. Skin:     General: Skin is warm and dry. Coloration: Skin is jaundiced. Neurological:      General: No focal deficit present. Mental Status: He is alert and oriented to person, place, and time. DIAGNOSTIC RESULTS    EKG     Sinus tachycardia, low voltage QRS, no acute pattern of infarct or ischemia. RADIOLOGY:    XR CHEST PORTABLE   Final Result   Probable mild left basilar subsegmental atelectasis. **This report has been created using voice recognition software. It may contain minor errors which are inherent in voice recognition technology. **      Final report electronically signed by Dr. Eben Mendoza MD on 8/11/2020 4:22 PM           LABS:     Labs Reviewed   CBC WITH AUTO DIFFERENTIAL - Abnormal; Notable for the following components:       Result Value    RBC 4.00 (*)     Hemoglobin 12.9 (*)     Hematocrit 38.4 (*)     MCV 96.1 (*)     MCH 32.3 (*)     RDW 14.6 (*)     SEGS 77.1 (*)     Lymphocytes 13.3 (*)     All other components within normal limits   COMPREHENSIVE METABOLIC PANEL - Abnormal; Notable for the following components:    Glucose 230 (*)     BUN 25 (*)     AST 50 (*)     Total Protein 6.3 (*)     Alb 3.1 (*)     Total Bilirubin 1.1 (*)     ALT 70 (*)     All other components within normal limits   MAGNESIUM - Abnormal; Notable for the following components:    Magnesium 1.7 (*)     All other components within normal limits   PROTIME - Abnormal; Notable for the following components:    INR 1.29 (*)     All other components within normal limits   GLOMERULAR FILTRATION RATE, ESTIMATED - Abnormal; Notable for the following components:    GFR, Estimated 64 (*)     All other components within normal limits   APTT   BLOOD OCCULT STOOL SCREEN #1   TROPONIN   ANION GAP   TYPE AND SCREEN       Vitals:    Vitals:    08/11/20 1655 08/11/20 1704 08/11/20 1725 08/11/20 1830   BP: (!) 102/55 100/68 104/62 (!) 111/58   Pulse: 127 130 117 129   Resp: 20 18 17 18   Temp:    98.7 °F (37.1 °C)   TempSrc:    Oral   SpO2: 97% 97% 98% 95%   Weight:    226 lb 9.6 oz (102.8 kg)   Height:    6' (1.829 m)       EMERGENCY DEPARTMENT COURSE:    IV fluid bolus, saline 1 L. IV Zofran and Protonix given. He feels lightheaded and dizzy, relieved by lying down. Current blood pressure 103/56, heart rate 126. Per old records from his primary care, labs on 5/27/2020 showed hemoglobin 15.4, hematocrit 45.5. I arranged for admission to [address] [place] [address], bed assignment delayed due to no beds. He started to feel more lightheaded and weak, and BP went down ot 94/60, . O2, additional fluids, second IV raised BP to 457 systolic, and he felt better. I called [address] [place] [address] again, and this time bed available, emergent transfer via EMS arranged. CRITICAL CARE:     30 minutes      FINAL IMPRESSION      1. Gastrointestinal hemorrhage, unspecified gastrointestinal hemorrhage type    2. Hypotension due to hypovolemia        DISPOSITION/PLAN    DISPOSITION Admitted 08/11/2020 04:30:43 PM to [address] [place] [address], Dr. Isaac Trevino.        PATIENT REFERRED TO:    Collins Robles  [address] [place] [address]  [address]  [phone]              (Please note that portions of this note were completed with a voice recognition program. Efforts were made to edit the dictations but occasionally words are mis-transcribed.)      Rishabh Blanco MD  08/11/20 3420

## 2020-08-12 ENCOUNTER — APPOINTMENT (OUTPATIENT)
Dept: ULTRASOUND IMAGING | Age: 65
DRG: 432 | End: 2020-08-12
Payer: MEDICARE

## 2020-08-12 LAB
ANION GAP SERPL CALCULATED.3IONS-SCNC: 9 MEQ/L (ref 8–16)
ANION GAP SERPL CALCULATED.3IONS-SCNC: 9 MEQ/L (ref 8–16)
ATYPICAL LYMPHOCYTES: ABNORMAL %
BASOPHILS # BLD: 0.2 %
BASOPHILS ABSOLUTE: 0 THOU/MM3 (ref 0–0.1)
BUN BLDV-MCNC: 37 MG/DL (ref 7–22)
BUN BLDV-MCNC: 47 MG/DL (ref 7–22)
CALCIUM SERPL-MCNC: 7.1 MG/DL (ref 8.5–10.5)
CALCIUM SERPL-MCNC: 7.4 MG/DL (ref 8.5–10.5)
CHLORIDE BLD-SCNC: 115 MEQ/L (ref 98–111)
CHLORIDE BLD-SCNC: 116 MEQ/L (ref 98–111)
CHLORIDE, URINE: 98 MEQ/L
CO2: 16 MEQ/L (ref 23–33)
CO2: 17 MEQ/L (ref 23–33)
CREAT SERPL-MCNC: 1 MG/DL (ref 0.4–1.2)
CREAT SERPL-MCNC: 1.1 MG/DL (ref 0.4–1.2)
DIFFERENTIAL TYPE: ABNORMAL
EOSINOPHIL # BLD: 0.1 %
EOSINOPHILS ABSOLUTE: 0 THOU/MM3 (ref 0–0.4)
ERYTHROCYTE [DISTWIDTH] IN BLOOD BY AUTOMATED COUNT: 16.1 % (ref 11.5–14.5)
ERYTHROCYTE [DISTWIDTH] IN BLOOD BY AUTOMATED COUNT: 58.1 FL (ref 35–45)
FERRITIN: 167 NG/ML (ref 22–322)
GFR SERPL CREATININE-BSD FRML MDRD: 67 ML/MIN/1.73M2
GFR SERPL CREATININE-BSD FRML MDRD: 75 ML/MIN/1.73M2
GLUCOSE BLD-MCNC: 184 MG/DL (ref 70–108)
GLUCOSE BLD-MCNC: 270 MG/DL (ref 70–108)
GLUCOSE BLD-MCNC: 276 MG/DL (ref 70–108)
GLUCOSE BLD-MCNC: 278 MG/DL (ref 70–108)
GLUCOSE BLD-MCNC: 292 MG/DL (ref 70–108)
GLUCOSE BLD-MCNC: 302 MG/DL (ref 70–108)
GLUCOSE BLD-MCNC: 305 MG/DL (ref 70–108)
GLUCOSE BLD-MCNC: 323 MG/DL (ref 70–108)
HCT VFR BLD CALC: 23.7 % (ref 42–52)
HCT VFR BLD CALC: 26.8 % (ref 42–52)
HCT VFR BLD CALC: 28.7 % (ref 42–52)
HCT VFR BLD CALC: 29.9 % (ref 42–52)
HCT VFR BLD CALC: 30.3 % (ref 42–52)
HEMOGLOBIN: 7.4 GM/DL (ref 14–18)
HEMOGLOBIN: 8.6 GM/DL (ref 14–18)
HEMOGLOBIN: 9.2 GM/DL (ref 14–18)
HEMOGLOBIN: 9.3 GM/DL (ref 14–18)
HEMOGLOBIN: 9.4 GM/DL (ref 14–18)
IMMATURE GRANS (ABS): 0.13 THOU/MM3 (ref 0–0.07)
IMMATURE GRANULOCYTES: 0.6 %
IRON: 241 UG/DL (ref 65–195)
LYMPHOCYTES # BLD: 12.1 %
LYMPHOCYTES ABSOLUTE: 2.5 THOU/MM3 (ref 1–4.8)
MAGNESIUM: 1.6 MG/DL (ref 1.6–2.4)
MCH RBC QN AUTO: 31 PG (ref 26–33)
MCHC RBC AUTO-ENTMCNC: 31.1 GM/DL (ref 32.2–35.5)
MCV RBC AUTO: 99.7 FL (ref 80–94)
MONOCYTES # BLD: 11.7 %
MONOCYTES ABSOLUTE: 2.4 THOU/MM3 (ref 0.4–1.3)
NUCLEATED RED BLOOD CELLS: 0 /100 WBC
PATHOLOGIST REVIEW: ABNORMAL
PH UA: 5 (ref 5–9)
PHOSPHORUS: 2 MG/DL (ref 2.4–4.7)
PLATELET # BLD: 193 THOU/MM3 (ref 130–400)
PLATELET ESTIMATE: ADEQUATE
PMV BLD AUTO: 11.8 FL (ref 9.4–12.4)
POTASSIUM REFLEX MAGNESIUM: 5.5 MEQ/L (ref 3.5–5.2)
POTASSIUM SERPL-SCNC: 4.2 MEQ/L (ref 3.5–5.2)
POTASSIUM, URINE: 47 MEQ/L
RBC # BLD: 3 MILL/MM3 (ref 4.7–6.1)
SCAN OF BLOOD SMEAR: NORMAL
SEG NEUTROPHILS: 75.3 %
SEGMENTED NEUTROPHILS ABSOLUTE COUNT: 15.5 THOU/MM3 (ref 1.8–7.7)
SODIUM BLD-SCNC: 140 MEQ/L (ref 135–145)
SODIUM BLD-SCNC: 142 MEQ/L (ref 135–145)
SODIUM URINE: 62 MEQ/L
TOTAL IRON BINDING CAPACITY: < 258 UG/DL (ref 171–450)
WBC # BLD: 20.6 THOU/MM3 (ref 4.8–10.8)

## 2020-08-12 PROCEDURE — 84300 ASSAY OF URINE SODIUM: CPT

## 2020-08-12 PROCEDURE — 76705 ECHO EXAM OF ABDOMEN: CPT

## 2020-08-12 PROCEDURE — 2000000000 HC ICU R&B

## 2020-08-12 PROCEDURE — 84133 ASSAY OF URINE POTASSIUM: CPT

## 2020-08-12 PROCEDURE — 6360000002 HC RX W HCPCS: Performed by: PHYSICIAN ASSISTANT

## 2020-08-12 PROCEDURE — 82436 ASSAY OF URINE CHLORIDE: CPT

## 2020-08-12 PROCEDURE — 2580000003 HC RX 258: Performed by: NURSE PRACTITIONER

## 2020-08-12 PROCEDURE — C9113 INJ PANTOPRAZOLE SODIUM, VIA: HCPCS | Performed by: PHYSICIAN ASSISTANT

## 2020-08-12 PROCEDURE — 2500000003 HC RX 250 WO HCPCS: Performed by: NURSE PRACTITIONER

## 2020-08-12 PROCEDURE — 2580000003 HC RX 258: Performed by: PHYSICIAN ASSISTANT

## 2020-08-12 PROCEDURE — 85018 HEMOGLOBIN: CPT

## 2020-08-12 PROCEDURE — 83540 ASSAY OF IRON: CPT

## 2020-08-12 PROCEDURE — 6370000000 HC RX 637 (ALT 250 FOR IP): Performed by: NURSE PRACTITIONER

## 2020-08-12 PROCEDURE — 82728 ASSAY OF FERRITIN: CPT

## 2020-08-12 PROCEDURE — 80048 BASIC METABOLIC PNL TOTAL CA: CPT

## 2020-08-12 PROCEDURE — 83550 IRON BINDING TEST: CPT

## 2020-08-12 PROCEDURE — 85025 COMPLETE CBC W/AUTO DIFF WBC: CPT

## 2020-08-12 PROCEDURE — 36415 COLL VENOUS BLD VENIPUNCTURE: CPT

## 2020-08-12 PROCEDURE — 82948 REAGENT STRIP/BLOOD GLUCOSE: CPT

## 2020-08-12 PROCEDURE — 84100 ASSAY OF PHOSPHORUS: CPT

## 2020-08-12 PROCEDURE — 82105 ALPHA-FETOPROTEIN SERUM: CPT

## 2020-08-12 PROCEDURE — 93975 VASCULAR STUDY: CPT

## 2020-08-12 PROCEDURE — 6360000002 HC RX W HCPCS: Performed by: INTERNAL MEDICINE

## 2020-08-12 PROCEDURE — 81003 URINALYSIS AUTO W/O SCOPE: CPT

## 2020-08-12 PROCEDURE — 2580000003 HC RX 258: Performed by: INTERNAL MEDICINE

## 2020-08-12 PROCEDURE — 93010 ELECTROCARDIOGRAM REPORT: CPT | Performed by: INTERNAL MEDICINE

## 2020-08-12 PROCEDURE — 6360000002 HC RX W HCPCS: Performed by: NURSE PRACTITIONER

## 2020-08-12 PROCEDURE — 94761 N-INVAS EAR/PLS OXIMETRY MLT: CPT

## 2020-08-12 PROCEDURE — 83735 ASSAY OF MAGNESIUM: CPT

## 2020-08-12 PROCEDURE — 85014 HEMATOCRIT: CPT

## 2020-08-12 PROCEDURE — 94003 VENT MGMT INPAT SUBQ DAY: CPT

## 2020-08-12 RX ORDER — SUCRALFATE 1 G/1
1 TABLET ORAL
Status: DISCONTINUED | OUTPATIENT
Start: 2020-08-12 | End: 2020-08-16

## 2020-08-12 RX ADMIN — INSULIN LISPRO 3 UNITS: 100 INJECTION, SOLUTION INTRAVENOUS; SUBCUTANEOUS at 06:32

## 2020-08-12 RX ADMIN — SODIUM CHLORIDE 8 MG/HR: 9 INJECTION, SOLUTION INTRAVENOUS at 17:54

## 2020-08-12 RX ADMIN — Medication 100 MCG/HR: at 08:19

## 2020-08-12 RX ADMIN — DEXMEDETOMIDINE HYDROCHLORIDE 0.7 MCG/KG/HR: 100 INJECTION, SOLUTION, CONCENTRATE INTRAVENOUS at 07:10

## 2020-08-12 RX ADMIN — SODIUM CHLORIDE: 9 INJECTION, SOLUTION INTRAVENOUS at 05:17

## 2020-08-12 RX ADMIN — SODIUM CHLORIDE: 9 INJECTION, SOLUTION INTRAVENOUS at 20:55

## 2020-08-12 RX ADMIN — INSULIN LISPRO 3 UNITS: 100 INJECTION, SOLUTION INTRAVENOUS; SUBCUTANEOUS at 00:31

## 2020-08-12 RX ADMIN — PROPOFOL 40 MCG/KG/MIN: 10 INJECTION, EMULSION INTRAVENOUS at 03:07

## 2020-08-12 RX ADMIN — INSULIN LISPRO 4 UNITS: 100 INJECTION, SOLUTION INTRAVENOUS; SUBCUTANEOUS at 12:24

## 2020-08-12 RX ADMIN — Medication 175 MCG/HR: at 01:05

## 2020-08-12 RX ADMIN — ONDANSETRON 4 MG: 2 INJECTION INTRAMUSCULAR; INTRAVENOUS at 15:05

## 2020-08-12 RX ADMIN — Medication 22 MCG/MIN: at 08:34

## 2020-08-12 RX ADMIN — SODIUM CHLORIDE, PRESERVATIVE FREE 10 ML: 5 INJECTION INTRAVENOUS at 11:19

## 2020-08-12 RX ADMIN — Medication 175 MCG/HR: at 04:00

## 2020-08-12 RX ADMIN — SODIUM CHLORIDE 8 MG/HR: 9 INJECTION, SOLUTION INTRAVENOUS at 08:18

## 2020-08-12 RX ADMIN — CEFTRIAXONE SODIUM 1 G: 1 INJECTION, POWDER, FOR SOLUTION INTRAMUSCULAR; INTRAVENOUS at 10:58

## 2020-08-12 RX ADMIN — OCTREOTIDE ACETATE 50 MCG/HR: 500 INJECTION, SOLUTION INTRAVENOUS; SUBCUTANEOUS at 08:18

## 2020-08-12 RX ADMIN — OCTREOTIDE ACETATE 50 MCG/HR: 500 INJECTION, SOLUTION INTRAVENOUS; SUBCUTANEOUS at 17:54

## 2020-08-12 RX ADMIN — DEXMEDETOMIDINE HYDROCHLORIDE 0.2 MCG/KG/HR: 100 INJECTION, SOLUTION, CONCENTRATE INTRAVENOUS at 01:11

## 2020-08-12 ASSESSMENT — ENCOUNTER SYMPTOMS
STRIDOR: 0
EYES NEGATIVE: 1
NAUSEA: 0
NAUSEA: 1
RESPIRATORY NEGATIVE: 1
CONSTIPATION: 0
BACK PAIN: 0
WHEEZING: 0
SORE THROAT: 0
BLOOD IN STOOL: 1
VOMITING: 0
COUGH: 0
SHORTNESS OF BREATH: 0
SINUS PAIN: 0
SINUS PRESSURE: 0
ABDOMINAL DISTENTION: 1
ALLERGIC/IMMUNOLOGIC NEGATIVE: 1
EYE REDNESS: 0
VOMITING: 1
ABDOMINAL PAIN: 0
DIARRHEA: 0

## 2020-08-12 ASSESSMENT — PULMONARY FUNCTION TESTS
PIF_VALUE: 17
PIF_VALUE: 17
PIF_VALUE: 16

## 2020-08-12 ASSESSMENT — PAIN SCALES - GENERAL
PAINLEVEL_OUTOF10: 0

## 2020-08-12 NOTE — PROGRESS NOTES
Patient:  Pau Boo    Unit/Bed:4D-06/006-A  MRN: [id number]   PCP: Hallie Ho  Date of Admission: 8/11/2020    Assessment and Plan(All pulmonary edema, renal failure, PE, and respiratory failure diagnoses are acute in nature unless otherwise specified):    1. Acute Postoperative Pulmonary Insufficiency: (resolved) Required intubation for EGD on 8/11/20. Extubated today, weaned to RA. 2. Upper GI Bleed: 2/2 large bleeding esophageal varices near GEJ, seen on EGD 8/11/20. GI following. S/p successful banding by GI on 8/11/20. Had not had recurrence of melena until this afternoon at approx. 1530. Will follow up Hgb, which had been stable, ranging from 9.2-9.4 after 1 pRBC given overnight. Trend H/H q6h. Continue octreotide and protonix infusions. Carafate 4 times daily per GI, and Rocephin 1g q24h. 3. Hypovolemic Shock: Remains on levophed despite fluid resuscitation and 1 pRBC. Requirements down-trending. Continue to wean as able to maintain MAP >65.  4. Non-Anion Gap Metabolic Acidosis: Improving. CO2 17, up from 16. K 4.2, down from 5.5. Anion gap 9. 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 Held ACEi today, and promoting better glucose control w/ higher dose insulin therapy. 5. Hyperkalemia: Resolved. K 4.2, down from 5.5 this morning. 6. Hyperchloremia: Cl 116 today. Reduce 0.9NS infusion to 100 mL/h.  7. Acute blood loss Anemia: 2/2 upper GI bleed. Repeat Hgb pending as noted above. Monitor H/H q6h.  8. Leukocytosis, fever: Temp ranged 98.3-100.8 last 24h. Continue to monitor. On prophylactic broad coverage w/ Rocephin per GI rec's. Abdomen non-tender. 9. DM type II: Hyperglycemic to 323 today. Increase SSI lispo to high-dose corrective therapy q4h. Given one-time dose of 10 units regular insulin. Holding home DM meds while acutely ill. 10. History of Hemochromatosis: Following outpatient. Reportedly has not required phlebotomy in 3 years.   11. Liver Cirrhosis: Findings on liver ultrasound report (8/12/20) consistent w/ cirrhosis of liver. Portal vein patent on doppler. GI following, appreciate rec's. 12. Hypocalcemia: Corrected Ca 8.1. Monitor daily. 13. Sleep Apnea: Restart noct CPAP given extubation today. Pt alert and able to remove mask himself if required. 14. HTN, history of: Pt currently in hypovolemic shock on pressors. Holding home antihypertensives. 13. Former ETOH abuse: Pt states he quit one month prior to admission. 16. HLD, history of: Holding home statin given elevated liver enzymes. 17. Gout, history of: continue home allopurinol. 18. GERD, history of: protonix infusion as above. 19. Diet: NPO  20. Dispo: ICU    CC:  Upper GI Bleed  HPI: Morgan Bailey is a 75 y.o. male w/ PMH of sleep apnea, HTN, HLD, gout, GERD, diabetes, hemochromatosis. He presented on 8/11/2020 to [facility] as a transfer from [facility] ambulatory care, with weakness, lightheadedness, nausea. Per report he had had 2 large black stools at home. Prior to arriving to the emergency department he was noted to have nausea and vomiting and vomited \"quite a bit of blood\". He continued to have nausea during his stay in the emergency department. He did state he had had an upper and lower GI a few years prior. He also did note that he has a history of hemochromatosis but has not required blood in the last 3 years. He follows with a hematologist in [facility] Upon transfer he initially arrived stepdown. Dr. Inocencio Mejia was consulted to see patient for GI. He did express that he would like patient transferred to ICU and intubated for an upper EGD. Patient was then transferred to the ICU. Patient was intubated and sedated. EGD was performed with 1 clip placed on esophageal varices. Request was for patient to remain intubated for possible follow-up procedure tomorrow. He did have noted hypotension, and was given packed red blood cells.     Today, 8/12/20, the patient was extubated after verbal confirmation that repeat EGD will not be needed. He had no recurrence of bloody BM until the afternoon. Hgb has remained stable, ranging from 9.2-9.4. He received 1 pRBC overnight. He remains on protonix and octreotide infusions. He tolerated extubation to 6L NC well, and was subsequently weaned to RA. He remains on levophed infusion for BP support, requirements downtrending. ROS: Review of Systems   Constitutional: Negative for chills, diaphoresis and fever. HENT: Negative for congestion, sinus pressure, sinus pain and sore throat. Eyes: Negative for redness and visual disturbance. Respiratory: Negative for cough, shortness of breath, wheezing and stridor. Cardiovascular: Negative for chest pain, palpitations and leg swelling. Gastrointestinal: Positive for abdominal distention and blood in stool ACCEL [facility]. One episode at approx. 1530). Negative for abdominal pain, constipation, diarrhea, nausea and vomiting. Genitourinary: Negative for decreased urine volume, dysuria, flank pain and hematuria. Musculoskeletal: Negative for back pain and neck pain. Skin: Negative for pallor, rash and wound. Neurological: Negative for dizziness, speech difficulty, weakness, light-headedness, numbness and headaches. Hematological: Negative for adenopathy. Does not bruise/bleed easily. Psychiatric/Behavioral: Negative for agitation and confusion. The patient is not nervous/anxious.       PMH:  Per HPI  SHX:  Reformed smoker, former ETOH abuse (pt states quit 1 month prior to admission), denies drug use   FHX: Mother: heart disease Father: lung cancer   Allergies: sulfa antibiotics  Medications:     sodium chloride 150 mL/hr at 08/12/20 0517    pantoprozole (PROTONIX) infusion 8 mg/hr (08/12/20 0818)    fentaNYL 5 mcg/ml in 0.9%  ml infusion Stopped (08/12/20 1048)    propofol 20 mcg/kg/min (08/12/20 0515)    octreotide (SANDOSTATIN) infusion 50 mcg/hr (08/12/20 0818)    norepinephrine 12 mcg/min (08/12/20 1527)    dextrose      dexmedetomidine Stopped (08/12/20 1208)      [START ON 8/16/2020] Dulaglutide  1.5 mg Subcutaneous Weekly    cefTRIAXone (ROCEPHIN) IV  1 g Intravenous Q24H    sucralfate  1 g Oral 4x Daily AC & HS    insulin regular  10 Units Intravenous Once    insulin lispro  0-18 Units Subcutaneous Q4H    allopurinol  300 mg Oral Daily    cetirizine  10 mg Oral Daily    [Held by provider] lisinopril  10 mg Oral Daily    montelukast  10 mg Oral Nightly    sodium chloride flush  10 mL Intravenous 2 times per day       Vital Signs:   T: 99.9: P: 105 RR: 22 B/P: 109/54: FiO2: RA: O2 Sat:96: I/O: 3613/750 (+2863) GCS: 13  Body mass index is 31.99 kg/m². [facility]:   Appears stated age, acute on chronically-ill. HEENT:  normocephalic and atraumatic. No scleral icterus. PERR. Neck: supple. No Thyromegaly. Trachea midline. Lungs: clear to auscultation. No retractions. No wheeze/rhonchi. Tachypneic. Unlabored. Cardiac: RRR. No JVD. S1/S2. No murmur. Abdomen: soft. Nontender. Moderately distended. BS active x4 quadrants. Extremities:  No clubbing, cyanosis, or edema x 4. Vasculature: capillary refill < 3 seconds. Palpable 2+ dorsalis pedis pulses. Skin:  warm and dry. Intact. No rash. Psych:  Alert and oriented x3. Follows commands. Nods yes/no appropriately. Affect appropriate. Lymph:  No supraclavicular adenopathy. Neurologic:  No focal deficit. No seizures. Spontaneous movement x4 extremities, equal moderate strength bilaterally. Data: (All radiographs, tracings, PFTs, and imaging are personally viewed and interpreted unless otherwise noted).  Telemetry: Sinus Tach, .  Na 142, K 4.2, Cl 116, CO2 17, BUN 37, Cr 1.1, An Gap 9.0, Glucose 302, Ca 7.4   WBC 20.6, Hgb 9.2, Hct 28.7, Plt 193k    Iron 241, TIBC <258    Liver profile (8/11/20):  Albumin 3.1, Alk Phos 94, ALT 70, AST 50, Bilirubin 1.1, Protein 6.3   Liver ultrasound (8/12/20) report:

## 2020-08-12 NOTE — PROGRESS NOTES
Pt admitted to ICU bed 6 from 3B  Complaints: GI bleed  IV saline infusing 150ml/hr RAC. condition patent and no redness noted. IV site free of infection or infiltration. Vital signs obtained. Assessment and data collection initiated. Oriented to room and call light. Policies and procedures for ICU explained. All questions answered with no further questions at this time. Fall prevent and safety brouchre discussed with patient. Bed locked in lowest position, call light within reach. This nurse, Khang Jack assessed pt and Samantha Senior RN charted it.

## 2020-08-12 NOTE — OP NOTE
[facility] Endoscopy    Patient: Arianna Da Silva  : 1955  Acct#: [de-identified]  Date of evaluation: 2020  Primary Care Physician: Pauly Garcia     Procedure:    [x]EGD    []Enteroscopy    []Biopsy   []Dilation      []PEG    []PEG change    []PEG removal  []Variceal banding    [x]Control of bleeding []Destruction of lesion by [facility]    []Stent Placement  []Foreign Body Removal    []Snare Polypectomy  []Other:     []Aborted:        []Colonoscopy  []Flex Sigmoid []EUS, rectal     []Biopsy   []Snare Polypectomy    []Control of bleeding  []Destruction of lesion by [facility]    []Stent Placement  []Foreign Body Removal    []Dilation    []Other:    []Aborted:   []Tattoo    Indication:  hematemesis    History:  The patient is a 75 y.o.  male admitted 20 for hematemesis and melena. Pt takes NSAID's. He has hemochromatosis. Symptoms began with weakness and nausea yesterday. Hemoglobin dropped 3.7 g/dl in 4 hours. Physical Exam:  VITALS: BP (!) 126/56   Pulse 133   Temp 98.5 °F (36.9 °C) (Oral)   Resp 22   Ht 6' (1.829 m)   Wt 226 lb 9.6 oz (102.8 kg)   SpO2 97%   BMI 30.73 kg/m²   The patient is a 75 y.o.  male in no acute distress. HEAD: Normal cephalic/atraumatic. Extra-occular motions intact bilaterally. NECK: No lymphadenopathy or bruits. CHEST: Rises equally on inspiration. Clear to auscultation bilaterally. CARDIOVASCULAR: Regular rate and rhythm without murmurs, rubs or gallops. ABDOMEN: Soft, nontender, and nondistended with normal bowel sounds. No Hepatosplemomegaly. UPPER EXTREMITIES: no cyanosis, clubbing, or edema. DERM: no rash or jaundice. LOWER EXTREMITIES: no cyanosis, clubbing, or edema. NEURO: Alert and oriented times four. Patient moves all extremities and has gross sensation in all extremities.     ASA: ASA 2 - Patient with mild systemic disease with no functional limitations    MEDICATION: MAC/Propofol/Anesthesia:  Yes     Photo:  Yes  Biopsy:  No      Description of Procedure: The risks and benefits of the procedure were described to the patient or their representative if unable to give consent including but not limited to bleeding, infection, poking a hole someplace requiring surgery to fix it, having a reaction to medication, and death. The patient or their representative if unable to give consent understood these risks and provided informed consent. Airway was assessed and is adequate for the planned sedation. Anesthesia: MAC  Estimated Blood Loss: less than 50   Complications: None  Specimens: Was Not Obtained    Sedation was administered by ICU provider who intubated and monitored the patient during the procedure. The patient was placed in the left lateral decubitus position. A forward-viewing Olympus endoscope was lubricated and inserted through the mouth into the oropharynx. Under direct visualization, the upper esophagus was intubated. The scope was advanced through the esophagus and stomach to second portion of duodenum. Scope was slowly withdrawn with good views of mucosal surfaces. The scope was retroflexed in the fundus. Findings and maneuvers are listed in impression below. The patient tolerated the procedure well. The scope was removed. There were no immediate complications. IMPRESSION:  1. Esophagus - large esophageal varices with small nipple near GEJ, likely source of GIB, 3 bands placed. 2.  Stomach - fundus full of blood and clot. 3.  Duodenum - normal    RECOMMENDATIONS:    1. Cont NPO  2.  Keep intubated  3. Cont ppi infusion  4. Add octreotide infusion  5.   Transfuse    Monika Burgos MD  9:55 PM 8/11/2020

## 2020-08-12 NOTE — PLAN OF CARE
Problem: Discharge Planning:  Goal: Participates in care planning  Description: Participates in care planning  8/12/2020 1248 by Whitney Cleaning RN  Note: Patient will be discharged to home when ready. Will continue to monitor and asses needs      Problem: Airway Clearance - Ineffective:  Goal: Ability to maintain a clear airway will improve  Description: Ability to maintain a clear airway will improve  8/12/2020 1248 by Whitney Cleaning RN  Note: Pt has been successfully extubated, pt currently on 5L per NC     Problem: Anxiety/Stress:  Goal: Level of anxiety will decrease  Description: Level of anxiety will decrease  8/12/2020 1248 by Whitney Cleaning RN  Note: Pt able to appropriately verbalize feelings     Problem: Aspiration:  Goal: Absence of aspiration  Description: Absence of aspiration  8/12/2020 1248 by Whitney Cleaning RN  Note: Pt to remain NPO until 8/13/2020       Problem: Bowel Function - Altered:  Goal: Bowel elimination is within specified parameters  Description: Bowel elimination is within specified parameters  8/12/2020 1248 by Whitney Cleaning RN  Note: No BM this shift     Problem: Cardiac Output - Decreased:  Goal: Hemodynamic stability will improve  Description: Hemodynamic stability will improve  8/12/2020 1248 by Whitney Cleaning RN  Note: Pt remains on levofed for pressure support     Problem: Nutrition Deficit:  Goal: Ability to achieve adequate nutritional intake will improve  Description: Ability to achieve adequate nutritional intake will improve  8/12/2020 1248 by Whitney Cleaning RN  Note: Pt currently NPO     Problem: Pain:  Description: Pain management should include both nonpharmacologic and pharmacologic interventions. Goal: Pain level will decrease  Description: Pain level will decrease  8/12/2020 1248 by Whitney Cleaning RN  Note: Patient able to verbalize pain on a scale of 0-10. PRN meds available and appropriate pain level established.       Problem: Serum Glucose Level - Abnormal:  Goal: Ability to maintain appropriate glucose levels will improve to within specified parameters  Description: Ability to maintain appropriate glucose levels will improve to within specified parameters  8/12/2020 1248 by Seb Stahl RN  Note: Pt has SS insulin q 6hrs     Problem: Skin Integrity - Impaired:  Goal: Will show no infection signs and symptoms  Description: Will show no infection signs and symptoms  8/12/2020 1248 by Seb Stahl RN  Note: Pt turned and repositioned every 2 hrs     Problem: Restraint Use - Nonviolent/Non-Self-Destructive Behavior:  Goal: Absence of restraint indications  Description: Absence of restraint indications  8/12/2020 1248 by Seb Stahl RN  Outcome: Completed     Problem: Falls - Risk of:  Goal: Will remain free from falls  Description: Will remain free from falls  Note: Patient continues to use bed alarms and call lights for fall prevention. No falls this shift      Problem: Skin Integrity:  Goal: Will show no infection signs and symptoms  Description: Will show no infection signs and symptoms  Note: Pt on IV antibiotics   Care plan reviewed with patient . Patient  verbalize understanding of the plan of care and contribute to goal setting.

## 2020-08-12 NOTE — PROCEDURES
ICU PROCEDURE - CVC PLACEMENT:    Risks and benefits to the procedure were discussed. Alternatives and their risks were discussed as well. INDICATION: Acute respiratory failure    SITE: Right Subclavian Vein      CONSENT: was obtained after explaining indication/risks to patient and/or next of kin    TIME OUT: taken    STERILE PREP: Prior to the procedure all involved washed their hands. Full maximum sterile field/barrier technique was followed (with cap and mask, gloves and sterile gown, as well as broad field sterile drapes). Local disinfection was performed with broad field application of orange dyed chloraprep solution, which was allowed to dry fully prior to the initiation of the procedure. LOCAL ANESTHETIC: Aqueous lidocaine 1%     ESTIMATED BLOOD LOSS: Minimal    ULTRASOUND GUIDANCE USED: No    PROCEDURE:  Using modified seldinger technique, the appropriate vessel was located with the introducer needle subsequent to the use of a 22g x 1.5 inch \"\" needle. The flexible J-tip wire was passed without difficulty or resistance, followed by minimal skin incisions over the introducer needle. The introducer needle was withdrawn and discarded, maintaining manual control of the guidewire at all times. After dilation of the tract, a preflushed 3 lumen CVC catheter was advanced over the guidewire without difficulty or resistance to its full extent. The guidewire was withdrawn and discarded and good return of nonpulsatile, dark venous blood assured through all lumes, with easy flushing of the lumens. The line was sutured in place then the site was reprepped with a  chlorprep solution followed by a Biopatch device. After hemostasis was assured, an op site was applied and all sharps and materials were discarded appropriately. EBL < 5 ml. COMPLICATIONS: None    POST PROCEDURE CHEST XRAY HAS BEEN ORDERED    Electronically signed by     Roberto Rosales.  Jose Luis Goetz CNP on 8/11/2020 at 11:29 PM

## 2020-08-12 NOTE — CONSULTS
Pt Name: Vinita Sanchez  MRN: [id number]  [id number]  YOB: 1955  Admit Date: 8/11/2020  3:20 PM  Date of evaluation: 8/11/2020  Primary Care Physician: Jessica España   [id number]     Requesting Provider:  ESTELA Horvath    DUANE Consult    Indication:  hematemesis    History:  The patient is a 75 y.o.  male admitted 8/11/20 for hematemesis and melena. Pt takes NSAID's. He has hemochromatosis. Symptoms began with weakness and nausea yesterday. Location:  Hematemesis  Duration:  <24h  Radiation:  none  Associated:  Weakness, lightheaded  Mitigating factors:  none  Exacerbating factors:  NSAID's    Last EGD:  11/16/17  Last Colonoscopy:  4/8/18    Past Medical History:        Diagnosis Date    Diabetes ([facility] [address]     GERD (gastroesophageal reflux disease)     Gout     Hyperlipidemia     Hypertension     Seasonal allergies     Sleep apnea      Past Surgical History:        Procedure Laterality Date    CERVICAL FUSION  2004    Dr Louie Chan    COLONOSCOPY  04/2015    Dr Martini Fraction  11/2017    Dr Bekah Sandoval     Allergies:  Sulfa antibiotics  Home Meds:  Prior to Admission medications    Medication Sig Start Date End Date Taking?  Authorizing Provider   Dulaglutide (TRULICITY) 6.64 CD/7.6DO SOPN Inject 1.75 mg into the skin once a week   Yes Historical Provider, MD   dapagliflozin (FARXIGA) 10 MG tablet Take 10 mg by mouth nightly   Yes Historical Provider, MD   atorvastatin (LIPITOR) 10 MG tablet Take 10 mg by mouth nightly   Yes Historical Provider, MD   lisinopril (PRINIVIL;ZESTRIL) 10 MG tablet Take 10 mg by mouth daily   Yes Historical Provider, MD   metFORMIN (GLUCOPHAGE) 1000 MG tablet Take 1,000 mg by mouth 2 times daily (with meals)   Yes Historical Provider, MD   allopurinol (ZYLOPRIM) 300 MG tablet Take 300 mg by mouth daily   Yes Historical Provider, MD   montelukast (SINGULAIR) 10 MG tablet Take 10 mg by mouth nightly   Yes Historical Provider, MD pantoprazole sodium (PROTONIX) 40 MG PACK packet Take 40 mg by mouth every morning (before breakfast)   Yes Historical Provider, MD   aspirin 81 MG tablet Take 81 mg by mouth daily   Yes Historical Provider, MD   cetirizine (ZYRTEC) 10 MG tablet Take 10 mg by mouth daily   Yes Historical Provider, MD   Misc Natural Products (OSTEO BI-FLEX ADV JOINT SHIELD PO) Take by mouth daily   Yes Historical Provider, MD   Multiple Vitamins-Minerals (MULTIVITAMIN ADULT PO) Take 1 tablet by mouth daily   Yes Historical Provider, MD      Current Meds:     Current Facility-Administered Medications   Medication Dose Route Frequency Provider Last Rate Last Dose    allopurinol (ZYLOPRIM) tablet 300 mg  300 mg Oral Daily [employer], PA-C        cetirizine (ZYRTEC) tablet 10 mg  10 mg Oral Daily [employer], PA-C        Dulaglutide SOPN 1.75 mg  1.75 mg Subcutaneous Weekly Whit Manzo PA-C        lisinopril (PRINIVIL;ZESTRIL) tablet 10 mg  10 mg Oral Daily [employer], PA-PRISCA        montelukast (SINGULAIR) tablet 10 mg  10 mg Oral Nightly [employer], PA-C        sodium chloride flush 0.9 % injection 10 mL  10 mL Intravenous 2 times per day [employer], PA-C        sodium chloride flush 0.9 % injection 10 mL  10 mL Intravenous PRN [employer], PA-C        acetaminophen (TYLENOL) tablet 650 mg  650 mg Oral Q6H PRN [employer], PA-PRISCA        Or    acetaminophen (TYLENOL) suppository 650 mg  650 mg Rectal Q6H PRN [employer], PAJENA        promethazine (PHENERGAN) tablet 12.5 mg  12.5 mg Oral Q6H PRN Whit Tabor PA-C        Or    ondansetron (ZOFRAN) injection 4 mg  4 mg Intravenous Q6H PRN [employer], PA-C        0.9 % sodium chloride infusion   Intravenous Continuous Whit Manzo PA-C        pantoprazole (PROTONIX) 80 mg in sodium chloride 0.9 % 50 mL bolus  80 mg Intravenous Once [employer], PA-C        pantoprazole (PROTONIX) 80 mg in sodium chloride 0.9 % 100 mL infusion  8 mg/hr Intravenous Continuous [place], [place]        [START ON 8/14/2020] pantoprazole (PROTONIX) injection 40 mg  40 mg Intravenous Daily [employer], PA-C        And    [START ON 8/14/2020] sodium chloride (PF) 0.9 % injection 10 mL  10 mL Intravenous Daily Whit Silvestre CM Joyce         Social History:   Social History     Socioeconomic History    Marital status:      Spouse name: Not on file    Number of children: Not on file    Years of education: Not on file    Highest education level: Not on file   Occupational History    Not on file   Social Needs    Financial resource strain: Not on file    Food insecurity     Worry: Not on file     Inability: Not on file    Transportation needs     Medical: Not on file     Non-medical: Not on file   Tobacco Use    Smoking status: Former Smoker    Smokeless tobacco: Never Used   Substance and Sexual Activity    Alcohol use: Yes     Comment: daily-2 beers    Drug use: No    Sexual activity: Not on file   Lifestyle    Physical activity     Days per week: Not on file     Minutes per session: Not on file    Stress: Not on file   Relationships    Social connections     Talks on phone: Not on file     Gets together: Not on file     Attends [employer] service: Not on file     Active member of club or organization: Not on file     Attends meetings of clubs or organizations: Not on file     Relationship status: Not on file    Intimate partner violence     Fear of current or ex partner: Not on file     Emotionally abused: Not on file     Physically abused: Not on file     Forced sexual activity: Not on file   Other Topics Concern    Not on file   Social History Narrative    Not on file     Family History:   father had colon cancer in his 45s    ROS:  GENERAL: No fever or chills. NEURO: No headache or seizure. EYES: No diplopia or vision loss. CARDIOVASCULAR: No chest pain or palpitations. RESPIRATORY: No dyspnea or cough. GI: YES melena.   NO hematochezia   : No dysuria or hematuria. GYN:  Not appropriate. MUSCULOSKELETAL: No new arthralgias or myalgias  DERM: No rash or jaundice. ENDOCRINE: No polyuria or polydipsia. PSYCH: No anxiety or depression. Physical Exam:  VITALS: BP (!) 126/56   Pulse 133   Temp 98.5 °F (36.9 °C) (Oral)   Resp 22   Ht 6' (1.829 m)   Wt 226 lb 9.6 oz (102.8 kg)   SpO2 97%   BMI 30.73 kg/m²   The patient is a 75 y.o.  male with Body mass index is 30.73 kg/m². in no acute distress. HEAD: Normal cephalic/atraumatic. Extra-occular motions intact bilaterally. NECK: No lymphadenopathy or bruits. CHEST: Rises equally on inspiration. Clear to auscultation bilaterally. CARDIOVASCULAR: Regular rate and rhythm without murmurs, rubs or gallops. ABDOMEN: Soft and nondistended with normal bowel sounds. No Hepatosplemomegaly. Tender:  non  UPPER EXTREMITIES: no cyanosis, clubbing, or edema. DERM: no rash or jaundice. LOWER EXTREMITIES: no cyanosis, clubbing, or edema. NEURO: Alert and oriented times four. Patient moves all extremities and has gross sensation in all extremities.     Assessment:    Hematemesis, UGIB  Acute blood loss anemia with large drop in hgb  Melena  Tachycardia    H/o hemochromatosis  Hypoalbuminemia  Thrombocytopenia, mild  Jf Favia, mild  Augustine Nivia  Possible cirrhosis/varices      Plan:    Cont NPO  Cont ppi infusion  Intubate for urgent EGD given large drop in hgb        Gavi Byers MD  8:44 PM 8/11/2020

## 2020-08-12 NOTE — PROGRESS NOTES
Pt successfully extubated at 1048, pt remains on 4L/NC and per GI needs to remain NPO until 8/13/2020

## 2020-08-12 NOTE — H&P
with mild systemic disease with no functional limitations    See GI consult dated 8/11/20    David Osborne MD  8:46 PM 8/11/2020

## 2020-08-12 NOTE — PROGRESS NOTES
Arrival to [facility] via bed with RN, transport and monitor. Admission assessment complete as charted, MRSA and VRE swabs obtained. Orientation to room and information card given to family. 2 person skin assessment completed.

## 2020-08-12 NOTE — FLOWSHEET NOTE
Pt had 2 episodes of very harsh retching. Zofran given after 1st episode. Maroon stool with each episode.

## 2020-08-12 NOTE — PROGRESS NOTES
Campos Ramirez here for egd. Scope used . Pictures taken. 4 bands placed in 3 areas of esophagus. Procedure completed and he tolerated well.

## 2020-08-12 NOTE — FLOWSHEET NOTE
[place] [address] PROGRESS NOTE      Patient: Kellie Thayer  Room #: 4D-06/006-A            YOB: 1955  Age: 75 y.o. Gender: male            Admit Date & Time: 8/11/2020  3:20 PM    Assessment:  Pt is a 75year old male who is in bed on ICU. His wife is by his side. Pt welcomed this  and welcomed prayer as well. Interventions: Words of encouragement and prayer were had. Outcomes:  Pt would not mind a follow up visit by the spiritual care staff. He and his wife were encouraged. Plan:    1. Care Plan:  Continue spiritual and emotional care for patient and family. Including prayers.      Electronically signed by Uyen Gaytan on 8/12/2020 at 5:13 PM.  Arjun Miller  [phone]

## 2020-08-12 NOTE — PLAN OF CARE
Problem: Discharge Planning:  Goal: Participates in care planning  Description: Participates in care planning  Outcome: Ongoing  Note: Pt unable due to intubated and sedated     Goal: Discharged to appropriate level of care  Description: Discharged to appropriate level of care  Outcome: Ongoing  Note: Pt remains in icu intubated and sedated      Problem: Airway Clearance - Ineffective:  Goal: Ability to maintain a clear airway will improve  Description: Ability to maintain a clear airway will improve  Outcome: Ongoing  Note: Creamy secretions suctioned from ett      Problem: Anxiety/Stress:  Goal: Level of anxiety will decrease  Description: Level of anxiety will decrease  Outcome: Ongoing  Note: Pt requires much sedation to remain calm while intubated      Problem: Aspiration:  Goal: Absence of aspiration  Description: Absence of aspiration  Outcome: Ongoing  Note: HOB 30 degrees     Problem:  Bowel Function - Altered:  Goal: Bowel elimination is within specified parameters  Description: Bowel elimination is within specified parameters  Outcome: Ongoing  Note: Pt had x1 small black BM      Problem: Cardiac Output - Decreased:  Goal: Hemodynamic stability will improve  Description: Hemodynamic stability will improve  Outcome: Ongoing  Note: Pt currently maxed on 30mcg of levo      Problem: Nutrition Deficit:  Goal: Ability to achieve adequate nutritional intake will improve  Description: Ability to achieve adequate nutritional intake will improve  Outcome: Ongoing  Note: Pt NPO      Problem: Pain:  Goal: Pain level will decrease  Description: Pain level will decrease  Outcome: Ongoing  Note: Rass +1 to +3   Pt has severe intermittent agitation and restlessness     Problem: Serum Glucose Level - Abnormal:  Goal: Ability to maintain appropriate glucose levels will improve to within specified parameters  Description: Ability to maintain appropriate glucose levels will improve to within specified parameters  Outcome:

## 2020-08-12 NOTE — CONSULTS
CRITICAL CARE PROGRESS NOTE      Patient:  Pau Boo    Unit/Bed:4D-06/006-A  YOB: 1955  MRN: [id number]   PCP: Hallie Ho  Date of Admission: 8/11/2020  Chief Complaint:- Upper GI bleed     Assessment and Plan:      1. Acute postoperative pulmonary insufficieny: Patient required intubation for procedure. Postprocedure patient was not extubated. Maintain peak pressures less than 35.  2. Acute upper GI bleed: Patient did have noted esophageal varices. One band was applied. Patient was given 1 unit packed red blood cells. Octreotide and Protonix have been started. Dr. Tony Dunlap following. 3. Acute blood loss anemia: Secondary to above H&H noted to be 12.9 and repeat 9.2. Monitor hemoglobin after blood transfusion. 4. Hypovolemic shock: Patient did require vasopressors post fluid resuscitation. Patient did improve with blood transfusion. Continue to wean vasopressors as tolerated. 5. Hematochromatosis: History, follows with Dr. Flavio Mendoza in [facility]   6. Thrombocytopenia: History, current platelets 502. Monitor. 7. Elevated INR: Mildly elevated at 1.29, no indication for transfusion or reversal at this time. Monitor. 8. Elevated LFT: Elevated ALT greater than AST. Patient does have a history of alcohol abuse. GI following. 5. Former ETOH abuse: Patient states that he quit 1 month prior. INITIAL H AND P AND ICU COURSE:  Tabatha Orozco is a 75year old white male who presented to [facility] as a transfer from [facility] ambulatory care, for weakness, lightheadedness, nausea. He has a past medical history of sleep apnea, hypertension, hyperlipidemia, gout, GERD, diabetes, hemochromatosis. Per report he had had 2 large black stools at home. Prior to arriving to the emergency department he was noted to have nausea and vomiting and vomited \"quite a bit of blood\". He continued to have nausea during his stay in the emergency department.   He did state he had had an upper and lower GI a few years prior. He also did note that he has a history of hemochromatosis but has not required blood in the last 3 years. He follows with a hematologist in [facility] Upon transfer he initially arrived stepdown. Dr. Lakshmi Lombardi was consulted to see patient for GI. He did express that he would like patient transferred to ICU and intubated for an upper EGD. Patient was then transferred to the ICU. Patient was intubated and sedated. EGD was performed with 1 clip placed on esophageal varices. Request was for patient to remain intubated for possible follow-up procedure tomorrow. He did have noted hypotension, and was given packed red blood cells. Past Medical History: per HPI  Family History: Mother: heart disease Father: lung cancer   Social History: Reformed smoker, former ETOH abuse, denies drug use     ROS   intubated and sedated on mechanical ventilator     Scheduled Meds:   [START ON 8/12/2020] allopurinol  300 mg Oral Daily    [START ON 8/12/2020] cetirizine  10 mg Oral Daily    Dulaglutide  1.75 mg Subcutaneous Weekly    [START ON 8/12/2020] lisinopril  10 mg Oral Daily    montelukast  10 mg Oral Nightly    sodium chloride flush  10 mL Intravenous 2 times per day    [START ON 8/14/2020] pantoprazole  40 mg Intravenous Daily    ketamine        propofol        sodium chloride  20 mL Intravenous Once    fentaNYL         Continuous Infusions:   sodium chloride      pantoprozole (PROTONIX) infusion      fentaNYL 5 mcg/ml in 0.9%  ml infusion      propofol         PHYSICAL EXAMINATION:  T:  98.3.  P:  121. RR:  12. B/P:  104/59. FiO2:  100. O2 Sat:  100. I/O:  0  Body mass index is 31.31 kg/m². General:   Acute on chronically ill appearing white male   HEENT:  normocephalic and atraumatic. No scleral icterus. PERR  Neck: supple. No Thyromegaly. Lungs: clear to auscultation. No retractions  Cardiac: RRR. No JVD. Abdomen: soft. Nontender.   Extremities:  No clubbing, cyanosis, or edema x 4. Vasculature: capillary refill < 3 seconds. Palpable dorsalis pedis pulses. Skin:  warm and dry. Psych:  Alert and oriented x3. Affect appropriate (prior to intubation)  Lymph:  No supraclavicular adenopathy. Neurologic:  No focal deficit. No seizures. Data: (All radiographs, tracings, PFTs, and imaging are personally viewed and interpreted unless otherwise noted).  Sodium 142, potassium 4.7, chloride 106, CO2 22, BUN 25, creatinine 1.2, anion gap 14.0, glucose 230.  WBC 10.2, hemoglobin 9.2, hematocrit 29.9, platelet count 592.  Telemetry shows sinus tachycardia   CT abd pelvis 7/30/2020 reports liver is mildly enlarged and lobulated. Splenomegaly    Chest xray 8/11/2020 mild left basilar atelectasis    EKG 8/11/2020 reports sinus tachycardia   Troponin negative        Meets Continued ICU Level Care Criteria:    [x] Yes   [] No - Transfer Planned to listed location:  [] HOSPITALIST CONTACTED- DR     Case and plan discussed with Dr. Arlene Perea and Dr. Kourtney Champagne. Electronically signed by Ari Morrison. NAOMIE Navarro - CNP  CRITICAL CARE SPECIALIST  Patient seen by me. Case discussed with NP. Look to wean 8/12/20. Electronically signed by Yanira Perea MD.

## 2020-08-12 NOTE — FLOWSHEET NOTE
08/11/20 2008   Provider Notification   Reason for Communication Evaluate   Provider Name [facility]    Provider Notification Physician   Method of Communication Call   Response En route   Notification Time 2008 2008 Notified Dr. Jose Barron patients hemoglobin dropped from 12.9 to 9.2. Verbal orders received to transfer patient to ICU to be intubated and prepped for endoscopy    2031 [facility]  2040 /57 .  Patient Transported to ICU with this RN and Rebekah POOLE

## 2020-08-12 NOTE — CARE COORDINATION
8/12/20, 8:21 AM EDT  DISCHARGE PLANNING EVALUATION:    Latasha Craig       Admitted from: ED 8/11/2020/ [address] day: 1   Location: -06/006-A Reason for admit: GI hemorrhage [K92.2] Status: IP  Admit order signed?: yes  PMH:  has a past medical history of Diabetes ([address] GERD (gastroesophageal reflux disease), Gout, Hyperlipidemia, Hypertension, Seasonal allergies, and Sleep apnea. Procedure:   8/11 Intubated  8/11 EGD: Large esophageal varices with small nipple near GEJ, likely source of GIB, 3 bands placed; stomach - fundus full of blood and clot; duodenum was normal  8/11 CVC right subclavian  8/11 CXR: Probable mild left basilar subsegmental atelectasis  8/12 Extubated  Medications:  Scheduled Meds:   allopurinol  300 mg Oral Daily    cetirizine  10 mg Oral Daily    Dulaglutide  1.75 mg Subcutaneous Weekly    [Held by provider] lisinopril  10 mg Oral Daily    montelukast  10 mg Oral Nightly    sodium chloride flush  10 mL Intravenous 2 times per day    norepinephrine        insulin lispro  0-6 Units Subcutaneous Q6H     Continuous Infusions:   sodium chloride 150 mL/hr at 08/12/20 0517    pantoprozole (PROTONIX) infusion 8 mg/hr (08/12/20 0818)    fentaNYL 5 mcg/ml in 0.9%  ml infusion 100 mcg/hr (08/12/20 0700)    propofol 20 mcg/kg/min (08/12/20 0515)    octreotide (SANDOSTATIN) infusion 50 mcg/hr (08/12/20 0818)    norepinephrine 22 mcg/min (08/12/20 0705)    dextrose      dexmedetomidine 0.7 mcg/kg/hr (08/12/20 0710)      Pertinent Info/Orders/Treatment Plan: Presented to [facility] with 1 day history of vomiting bright red blood, nausea, 2 large plack stools at home. Admitted to 3B. Hgb dropped from 12.9 to 9.2. GI consulted and requested patient be transferred to ICU and intubated for EGD. Esophageal varices noted with EGD and bands placed. Patient to remain intubated and possible follow-up procedure today. Was hypotensive and received 1 PRBC.  Started on octreotide and protonix

## 2020-08-13 LAB
AFP-TUMOR MARKER: 3.7 UG/L
ANION GAP SERPL CALCULATED.3IONS-SCNC: 7 MEQ/L (ref 8–16)
BUN BLDV-MCNC: 26 MG/DL (ref 7–22)
CALCIUM SERPL-MCNC: 7.4 MG/DL (ref 8.5–10.5)
CHLORIDE BLD-SCNC: 118 MEQ/L (ref 98–111)
CO2: 20 MEQ/L (ref 23–33)
CREAT SERPL-MCNC: 0.8 MG/DL (ref 0.4–1.2)
ERYTHROCYTE [DISTWIDTH] IN BLOOD BY AUTOMATED COUNT: 16.4 % (ref 11.5–14.5)
ERYTHROCYTE [DISTWIDTH] IN BLOOD BY AUTOMATED COUNT: 58.4 FL (ref 35–45)
GFR SERPL CREATININE-BSD FRML MDRD: > 90 ML/MIN/1.73M2
GLUCOSE BLD-MCNC: 175 MG/DL (ref 70–108)
GLUCOSE BLD-MCNC: 182 MG/DL (ref 70–108)
GLUCOSE BLD-MCNC: 188 MG/DL (ref 70–108)
GLUCOSE BLD-MCNC: 199 MG/DL (ref 70–108)
GLUCOSE BLD-MCNC: 200 MG/DL (ref 70–108)
GLUCOSE BLD-MCNC: 201 MG/DL (ref 70–108)
GLUCOSE BLD-MCNC: 210 MG/DL (ref 70–108)
HCT VFR BLD CALC: 22 % (ref 42–52)
HCT VFR BLD CALC: 22.1 % (ref 42–52)
HCT VFR BLD CALC: 22.6 % (ref 42–52)
HCT VFR BLD CALC: 23.2 % (ref 42–52)
HEMOGLOBIN: 7.1 GM/DL (ref 14–18)
HEMOGLOBIN: 7.2 GM/DL (ref 14–18)
HEMOGLOBIN: 7.2 GM/DL (ref 14–18)
HEMOGLOBIN: 7.4 GM/DL (ref 14–18)
INR BLD: 1.42 (ref 0.85–1.13)
MCH RBC QN AUTO: 32 PG (ref 26–33)
MCHC RBC AUTO-ENTMCNC: 32.3 GM/DL (ref 32.2–35.5)
MCV RBC AUTO: 99.1 FL (ref 80–94)
MRSA SCREEN: NORMAL
PLATELET # BLD: 76 THOU/MM3 (ref 130–400)
PMV BLD AUTO: 11.8 FL (ref 9.4–12.4)
POTASSIUM SERPL-SCNC: 3.6 MEQ/L (ref 3.5–5.2)
RBC # BLD: 2.22 MILL/MM3 (ref 4.7–6.1)
SODIUM BLD-SCNC: 145 MEQ/L (ref 135–145)
URINE CULTURE, ROUTINE: NORMAL
WBC # BLD: 5.1 THOU/MM3 (ref 4.8–10.8)

## 2020-08-13 PROCEDURE — 99232 SBSQ HOSP IP/OBS MODERATE 35: CPT | Performed by: INTERNAL MEDICINE

## 2020-08-13 PROCEDURE — 85027 COMPLETE CBC AUTOMATED: CPT

## 2020-08-13 PROCEDURE — 2580000003 HC RX 258: Performed by: INTERNAL MEDICINE

## 2020-08-13 PROCEDURE — 36415 COLL VENOUS BLD VENIPUNCTURE: CPT

## 2020-08-13 PROCEDURE — 85018 HEMOGLOBIN: CPT

## 2020-08-13 PROCEDURE — 6370000000 HC RX 637 (ALT 250 FOR IP): Performed by: PHYSICIAN ASSISTANT

## 2020-08-13 PROCEDURE — 2580000003 HC RX 258: Performed by: NURSE PRACTITIONER

## 2020-08-13 PROCEDURE — C9113 INJ PANTOPRAZOLE SODIUM, VIA: HCPCS | Performed by: PHYSICIAN ASSISTANT

## 2020-08-13 PROCEDURE — 36430 TRANSFUSION BLD/BLD COMPNT: CPT

## 2020-08-13 PROCEDURE — 80048 BASIC METABOLIC PNL TOTAL CA: CPT

## 2020-08-13 PROCEDURE — 2580000003 HC RX 258: Performed by: PHYSICIAN ASSISTANT

## 2020-08-13 PROCEDURE — P9016 RBC LEUKOCYTES REDUCED: HCPCS

## 2020-08-13 PROCEDURE — 85014 HEMATOCRIT: CPT

## 2020-08-13 PROCEDURE — 6360000002 HC RX W HCPCS: Performed by: INTERNAL MEDICINE

## 2020-08-13 PROCEDURE — 6360000002 HC RX W HCPCS: Performed by: PHYSICIAN ASSISTANT

## 2020-08-13 PROCEDURE — 85610 PROTHROMBIN TIME: CPT

## 2020-08-13 PROCEDURE — 1200000003 HC TELEMETRY R&B

## 2020-08-13 PROCEDURE — 6360000002 HC RX W HCPCS: Performed by: NURSE PRACTITIONER

## 2020-08-13 PROCEDURE — 94760 N-INVAS EAR/PLS OXIMETRY 1: CPT

## 2020-08-13 PROCEDURE — 6370000000 HC RX 637 (ALT 250 FOR IP): Performed by: NURSE PRACTITIONER

## 2020-08-13 PROCEDURE — 82948 REAGENT STRIP/BLOOD GLUCOSE: CPT

## 2020-08-13 RX ORDER — 0.9 % SODIUM CHLORIDE 0.9 %
20 INTRAVENOUS SOLUTION INTRAVENOUS ONCE
Status: COMPLETED | OUTPATIENT
Start: 2020-08-13 | End: 2020-08-13

## 2020-08-13 RX ADMIN — SODIUM CHLORIDE: 9 INJECTION, SOLUTION INTRAVENOUS at 07:07

## 2020-08-13 RX ADMIN — CEFTRIAXONE SODIUM 1 G: 1 INJECTION, POWDER, FOR SOLUTION INTRAMUSCULAR; INTRAVENOUS at 11:14

## 2020-08-13 RX ADMIN — OCTREOTIDE ACETATE 50 MCG/HR: 500 INJECTION, SOLUTION INTRAVENOUS; SUBCUTANEOUS at 05:06

## 2020-08-13 RX ADMIN — SUCRALFATE 1 G: 1 TABLET ORAL at 20:40

## 2020-08-13 RX ADMIN — SUCRALFATE 1 G: 1 TABLET ORAL at 18:10

## 2020-08-13 RX ADMIN — OCTREOTIDE ACETATE 50 MCG/HR: 500 INJECTION, SOLUTION INTRAVENOUS; SUBCUTANEOUS at 16:09

## 2020-08-13 RX ADMIN — SODIUM CHLORIDE 8 MG/HR: 9 INJECTION, SOLUTION INTRAVENOUS at 16:09

## 2020-08-13 RX ADMIN — ACETAMINOPHEN 650 MG: 325 TABLET ORAL at 15:24

## 2020-08-13 RX ADMIN — SUCRALFATE 1 G: 1 TABLET ORAL at 11:47

## 2020-08-13 RX ADMIN — SODIUM CHLORIDE 20 ML: 9 INJECTION, SOLUTION INTRAVENOUS at 15:31

## 2020-08-13 RX ADMIN — SODIUM CHLORIDE 8 MG/HR: 9 INJECTION, SOLUTION INTRAVENOUS at 05:06

## 2020-08-13 ASSESSMENT — ENCOUNTER SYMPTOMS
COLOR CHANGE: 0
VOMITING: 0
ABDOMINAL PAIN: 0
DIARRHEA: 0
CHEST TIGHTNESS: 0
SORE THROAT: 0
COUGH: 0
BLOOD IN STOOL: 1
WHEEZING: 0
TROUBLE SWALLOWING: 0
ABDOMINAL DISTENTION: 0
SHORTNESS OF BREATH: 0
EYE REDNESS: 0
CONSTIPATION: 0
NAUSEA: 0
BACK PAIN: 0

## 2020-08-13 ASSESSMENT — PAIN SCALES - GENERAL
PAINLEVEL_OUTOF10: 0
PAINLEVEL_OUTOF10: 0
PAINLEVEL_OUTOF10: 5

## 2020-08-13 NOTE — PROGRESS NOTES
Pt Name: Janice Hagan  MRN: [id number]  [id number]  YOB: 1955  Admit Date: 8/11/2020  3:20 PM  Date of evaluation: 8/13/2020  Primary Care Physician: Zahraa Lal   4D-06/006-A   Dictating for Dr Ken Calvillo denies abd pain, nausea, vomiting. Discussed cirrhosis in detail as well as varices. No BM today. Last BM yesterday at 1700 and was maroon and with clots after a wretching episode. PROCEDURE: US LIVER, US DUP ABD PEL RETRO SCROT COMPLETE         CLINICAL INFORMATION: 49-year-old male with cirrhosis. Evaluation for portal vein thrombosis.         COMPARISON: CT 7/30/2020.         TECHNIQUE: Multiplanar sonographic images were obtained of the liver.         FINDINGS:    Liver - L= 19.2 cm    Gallbladder - 5.3 x 2.9 x 2.6 cm    Gallbladder Wall - 0.29 cm    Common Duct - 0.69 cm    Cunningham's Sign: negative         The liver demonstrates a heterogenous echotexture and has a nodular contour. There are no liver masses. There is no intrahepatic biliary ductal dilatation.         There is normal color flow and spectral analysis in the main portal vein, right and left portal veins, hepatic artery, inferior vena cava and all 3 hepatic veins.         The pancreatic head and body are within normal limits. The tail is not well seen due to overlying bowel gas.         The gallbladder is not distended. There is no gallbladder wall thickening.  No gallstones are identified. The common bile duct is normal for the patient's age and measures 7 mm.         There is no hydronephrosis in the visualized aspects of the right kidney.              Impression         1. Heterogenous echotexture of the liver with a nodular contour, findings consistent with cirrhotic changes. 2. The portal vein is patent and demonstrates normal hepatopedal flow. 3. Cholelithiasis.                     **This report has been created using voice recognition software.  It may contain minor errors which are inherent in voice recognition technology. **         Final report electronically signed by Dr Harris Liu on 8/12/2020 1:16 PM               O  /65   Pulse 91   Temp 98.5 °F (36.9 °C) (Oral)   Resp 17   Ht 6' (1.829 m)   Wt 230 lb 9.6 oz (104.6 kg)   SpO2 94%   BMI 31.28 kg/m²   VSS, afebrile  Alert and oriented  Resp; CTAB  Chest: RRR  Abd: soft, non-tender, non-tender, non-distended with active BS'S  Ext; + edema      Assessment:  1. UGIB with Hematemesis and Melena due to esophageal varices, no melena or hematochezia today or overnight. Last episode at 1700 8-  2. Acute blood loss anemia, hgb 7.2,  Was 9.2 yesterday. 3. Large esophageal varices with small nipple near GEJ, likely source of GIB, 3 bands placed 8-. Fundus was full of blood and clots. 4. Cirrhosis, possibly due to fatty liver, hemachromatosis or alcohol. Hepatopathy labs done in office recently normal.  AFP normal.    5. Leukocytosis   6. Low grade fever, improved  7. Tachycardia  8. H/o hemochromatosis. Has not required phlebotomy in 2 years. 9. Hypoalbuminemia   10. Thrombocytopenia, mild  11. ^transaminases, mild  12. Inis Fleeting  13.  Cirrhosis per CT scan and fibroscan        Recent CT scan 7- liver was mildly enlarged and lobulated contour. Splenomegaly had worsened since prior exam.       Plan  1. PPI infusion  2. Octreotide infusion  3. H & H every 6 hours  4. Carafate 4 times daily  5. NPO foe now, will assess hgb and consider clears later if remains stable. 6. Supportive care per primary  7. Transfuse as needed  8. Pt will need repeat EGD to monitor varices as outpatient in few months  9. Rocephin due to variceal bleed  10. Follow      Addendum 1200  Pt hgb reviewed and was stable at 7.2. Pt has not had any BM's today   Discussed with Dr Peggy Alexander and will give one unit PRBC  CBC and INR to be added today as well.   If hgb is stable later today and appropriate without any active bleeding can start clear liquids only later today.      Assessment and plan discussed with Dr. Eura Apley, APRN, CNP, 8/13/2020, 8:39 AM  .

## 2020-08-13 NOTE — PLAN OF CARE
Intensivist Progress Note      Patient:  Li Dianeton    Unit/Bed:4D-06/006-A  YOB: 1955  MRN: [id number]   Acct: [de-identified]     PCP: Aditya Gallegos  Date of Admission: 8/11/2020    Signout per phone to Hospitalist, CRISTIN Loza. Patient stable to transfer out of Intensive Care.     NAOMIE Zavala CNP

## 2020-08-13 NOTE — PROGRESS NOTES
Patient:  Sidney Leon    Unit/Bed:4D-06/006-A  MRN: [id number]   PCP: Janice Phan  Date of Admission: 8/11/2020    Assessment and Plan(All pulmonary edema, renal failure, PE, and respiratory failure diagnoses are acute in nature unless otherwise specified):        1. Upper GI bleed: 2/2 large bleeding esophageal varices near GEJ, seen on EGD 8/11/20. GI following. S/p successful banding by GI on 8/11/20. Required 1 pRBC. Last occurrence of bloody BM was 8/12/20 at approx. 1730. Continue to trend H/H q6h; Hgb down 7.2 today from 8.6 yesterday evening. Continue octreotide and protonix infusions. Carafate 4 times daily per GI, and Rocephin 1g q24h. 2. Non-Anion Gap Metabolic Acidosis: Improving. CO2 20 from 17 yesterday. K improved to 3.6 from 5.5 yesterday. Anion Gap 7.0. 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 Holding ACEi, and promoting better glucose control. 3. Acute blood loss Anemia: 2/2 upper GI bleed. Monitor H/H q6h.  4. Hyperchloremia: Cl 118 today. Consider further reduction of 0.9NS infusion rate. 5. Acute postoperative pulmonary insufficiency (resolved): Required intubation for EGD on 8/11/20. Extubated today, weaned to RA. 6. Leukocytosis, fever: Afebrile today. Continue to monitor. On prophylactic SBP broad coverage w/ Rocephin per GI rec's. Abdomen non-tender. 7. DM type II: Improving. Glucose 182 today, from max 323 yesterday. Maintain SSI lispo at high-dose corrective therapy q4h. Holding home DM meds while acutely ill. 8. Hypovolemic shock (resolved): Fluid resuscitated on 8/11/20, subsequently requiring pressor support w/ levo. Successfully weaned from levophed overnight. MAP goal >65.  9. History of Hemochromatosis: Following outpatient. Reportedly has not required phlebotomy in 3 years. 10. Liver Cirrhosis: Findings on liver ultrasound report (8/12/20) consistent w/ cirrhosis of liver. Portal vein patent on doppler.  GI following, appreciate that repeat EGD will not be needed. He had 2 recurrences of dark melena, last at approx. 1730. He tolerated extubation to 6L NC well, and was subsequently weaned to RA. Today, 8/13/20, he was successfully weaned from levophed overnight, and had no recurrence of bloody BM's. His Hgb down-trended from 8.6 to 7.2, but remains asymptomatic. ROS: Review of Systems   Constitutional: Negative for chills, diaphoresis, fatigue and fever. HENT: Negative for congestion, hearing loss, sore throat and trouble swallowing. Eyes: Negative for redness and visual disturbance. Respiratory: Negative for cough, chest tightness, shortness of breath and wheezing. Cardiovascular: Negative for chest pain, palpitations and leg swelling. Gastrointestinal: Positive for blood in stool (last occurrence 8/12/20 at approx 1730. \"dark maroon\" \"almost black\"). Negative for abdominal distention, abdominal pain, constipation, diarrhea, nausea and vomiting. Endocrine: Negative for polydipsia, polyphagia and polyuria. Genitourinary: Negative for decreased urine volume, difficulty urinating, dysuria and hematuria. Musculoskeletal: Negative for arthralgias, back pain and myalgias. Skin: Negative for color change, pallor, rash and wound. Neurological: Negative for dizziness, weakness, light-headedness and headaches. Psychiatric/Behavioral: Negative for agitation and confusion. The patient is not nervous/anxious.       PMH:  Per HPI  SHX:  Reformed smoker, former ETOH abuse (pt states quit 1 month prior to admission), denies drug use   FHX: Mother: heart disease Father: lung cancer   Allergies: sulfa antibiotics  Medications:     sodium chloride 100 mL/hr at 08/12/20 2055    pantoprozole (PROTONIX) infusion 8 mg/hr (08/13/20 0506)    octreotide (SANDOSTATIN) infusion 50 mcg/hr (08/13/20 0506)    dextrose        [START ON 8/16/2020] Dulaglutide  1.5 mg Subcutaneous Weekly    cefTRIAXone (ROCEPHIN) IV  1 g Intravenous Q24H  sucralfate  1 g Oral 4x Daily AC & HS    insulin lispro  0-18 Units Subcutaneous Q4H    allopurinol  300 mg Oral Daily    cetirizine  10 mg Oral Daily    [Held by provider] lisinopril  10 mg Oral Daily    montelukast  10 mg Oral Nightly    sodium chloride flush  10 mL Intravenous 2 times per day       Vital Signs:   T: 98.7: P: 100 RR: 20 B/P: 115/61: FiO2: RA: O2 Sat:100: I/O: 4550/1700 (+2850) GCS: 15  Body mass index is 31.28 kg/m². Marlene Peana General:   Appears stated age. Appears acute on chronically ill. HEENT:  normocephalic and atraumatic. No scleral icterus. PERR. Neck: supple. No Thyromegaly. Trachea midline. Lungs: clear to auscultation. No retractions. No wheeze/rhonchi. Unlabored. Regular rate and pattern. Cardiac: RRR. No JVD. S1/S2. No murmur. Abdomen: Soft. Nontender. BS active x4 quadrants. Liver edge palpable. Extremities:  No clubbing, cyanosis, or edema x 4. Vasculature: capillary refill < 3 seconds. Palpable 2+ dorsalis pedis pulses. Skin:  warm and dry. No jaundice. Intact. No rash. Psych:  Alert and oriented x4. Affect appropriate. Follows commands. Lymph:  No supraclavicular adenopathy. Neurologic:  No focal deficit. No seizures. Equal strength x4 extremities. Data: (All radiographs, tracings, PFTs, and imaging are personally viewed and interpreted unless otherwise noted).  Telemetry: Sinus tachycardia. .  Na 145, Cl 118, CO2 20, BUN 26, Cr 0.8, Gluc 182-276, Ca 7.4, Phos 2.0   WBC 20.6 (8/12/20), Hgb 7.2, Hct 22.6, Plt 193 (8/12/20)   Iron 241, TIBC <258, ferritin 167   Liver profile (8/11/20): Albumin 3.1, Alk Phos 94, ALT 70, AST 50, Bilirubin 1.1, Protein 6.3   Liver ultrasound (8/12/20) report: Heterogenous echotexture of the liver with a nodular contour, findings consistent with cirrhotic changes. The portal vein is patent and demonstrates normal hepatopedal flow. Cholelithiasis.     Electronically signed by CRISTIN Mcdonald Patient seen by me. Case discussed with nurse practitioner. Continue with supportive care. Electronically signed by Jolanta Raya MD.

## 2020-08-13 NOTE — CARE COORDINATION
8/13/20, 12:30 PM EDT    DISCHARGE ON GOING [address] day: 2  Location: 8B-30/030-A Reason for admit: GI hemorrhage [K92.2]   Procedure:   8/11 Intubated  8/11 EGD: Large esophageal varices with small nipple near GEJ, likely source of GIB, 3 bands placed; stomach - fundus full of blood and clot; duodenum was normal  8/11 CVC right subclavian  8/11 CXR: Probable mild left basilar subsegmental atelectasis  8/12 Extubated  8/12 US Liver/Abd/pelvis: Heterogenous echotexture of the liver with a nodular contour, findings consistent with cirrhotic changes; The portal vein is patent and demonstrates normal hepatopedal flow; Cholelithiasis  Treatment Plan of Care: Levo off last evening. On room air. Tmax 100.8. NSR. Ox4. Telemetry, SCDs. IVF, octreotide @ 50 mcg/hr, protonix @ 8 mg/hr, IV rocephin, SSI Q4H, carafate. Cl 118, CO2 20, hgb 7.2. Barriers to Discharge: not medically ready  PCP: Mis Torres  Readmission Risk Score: 21%  Patient Goals/Plan/Treatment Preferences: Home with wife. Denies needs, declines HH. Pt transferred to 8B30. Handoff report given to Arline Stern CM.

## 2020-08-14 LAB
ALBUMIN SERPL-MCNC: 2.7 G/DL (ref 3.5–5.1)
ALP BLD-CCNC: 61 U/L (ref 38–126)
ALT SERPL-CCNC: 37 U/L (ref 11–66)
ANION GAP SERPL CALCULATED.3IONS-SCNC: 7 MEQ/L (ref 8–16)
AST SERPL-CCNC: 49 U/L (ref 5–40)
BILIRUB SERPL-MCNC: 0.7 MG/DL (ref 0.3–1.2)
BUN BLDV-MCNC: 16 MG/DL (ref 7–22)
CALCIUM SERPL-MCNC: 7.2 MG/DL (ref 8.5–10.5)
CHLORIDE BLD-SCNC: 116 MEQ/L (ref 98–111)
CO2: 23 MEQ/L (ref 23–33)
CREAT SERPL-MCNC: 0.7 MG/DL (ref 0.4–1.2)
ERYTHROCYTE [DISTWIDTH] IN BLOOD BY AUTOMATED COUNT: 17.7 % (ref 11.5–14.5)
ERYTHROCYTE [DISTWIDTH] IN BLOOD BY AUTOMATED COUNT: 62.3 FL (ref 35–45)
GFR SERPL CREATININE-BSD FRML MDRD: > 90 ML/MIN/1.73M2
GLUCOSE BLD-MCNC: 164 MG/DL (ref 70–108)
GLUCOSE BLD-MCNC: 175 MG/DL (ref 70–108)
GLUCOSE BLD-MCNC: 208 MG/DL (ref 70–108)
GLUCOSE BLD-MCNC: 212 MG/DL (ref 70–108)
GLUCOSE BLD-MCNC: 227 MG/DL (ref 70–108)
HCT VFR BLD CALC: 23.8 % (ref 42–52)
HCT VFR BLD CALC: 24 % (ref 42–52)
HCT VFR BLD CALC: 24.3 % (ref 42–52)
HCT VFR BLD CALC: 24.9 % (ref 42–52)
HCT VFR BLD CALC: 25 % (ref 42–52)
HEMOGLOBIN: 7.5 GM/DL (ref 14–18)
HEMOGLOBIN: 7.6 GM/DL (ref 14–18)
HEMOGLOBIN: 7.6 GM/DL (ref 14–18)
HEMOGLOBIN: 7.7 GM/DL (ref 14–18)
HEMOGLOBIN: 7.8 GM/DL (ref 14–18)
MAGNESIUM: 2 MG/DL (ref 1.6–2.4)
MCH RBC QN AUTO: 31.1 PG (ref 26–33)
MCHC RBC AUTO-ENTMCNC: 31.3 GM/DL (ref 32.2–35.5)
MCV RBC AUTO: 99.6 FL (ref 80–94)
PLATELET # BLD: 77 THOU/MM3 (ref 130–400)
PLATELET # BLD: 78 THOU/MM3 (ref 130–400)
PMV BLD AUTO: 11.7 FL (ref 9.4–12.4)
POTASSIUM REFLEX MAGNESIUM: 3.4 MEQ/L (ref 3.5–5.2)
RBC # BLD: 2.44 MILL/MM3 (ref 4.7–6.1)
SODIUM BLD-SCNC: 146 MEQ/L (ref 135–145)
TOTAL PROTEIN: 4.6 G/DL (ref 6.1–8)
WBC # BLD: 4.1 THOU/MM3 (ref 4.8–10.8)

## 2020-08-14 PROCEDURE — 6360000002 HC RX W HCPCS: Performed by: NURSE PRACTITIONER

## 2020-08-14 PROCEDURE — 2580000003 HC RX 258: Performed by: INTERNAL MEDICINE

## 2020-08-14 PROCEDURE — 6360000002 HC RX W HCPCS: Performed by: PHYSICIAN ASSISTANT

## 2020-08-14 PROCEDURE — 1200000003 HC TELEMETRY R&B

## 2020-08-14 PROCEDURE — 2580000003 HC RX 258: Performed by: NURSE PRACTITIONER

## 2020-08-14 PROCEDURE — 85014 HEMATOCRIT: CPT

## 2020-08-14 PROCEDURE — 82948 REAGENT STRIP/BLOOD GLUCOSE: CPT

## 2020-08-14 PROCEDURE — 6370000000 HC RX 637 (ALT 250 FOR IP): Performed by: NURSE PRACTITIONER

## 2020-08-14 PROCEDURE — 85018 HEMOGLOBIN: CPT

## 2020-08-14 PROCEDURE — 6360000002 HC RX W HCPCS: Performed by: INTERNAL MEDICINE

## 2020-08-14 PROCEDURE — 99232 SBSQ HOSP IP/OBS MODERATE 35: CPT | Performed by: NURSE PRACTITIONER

## 2020-08-14 PROCEDURE — 36415 COLL VENOUS BLD VENIPUNCTURE: CPT

## 2020-08-14 PROCEDURE — 83735 ASSAY OF MAGNESIUM: CPT

## 2020-08-14 PROCEDURE — 85049 AUTOMATED PLATELET COUNT: CPT

## 2020-08-14 PROCEDURE — 6370000000 HC RX 637 (ALT 250 FOR IP): Performed by: PHYSICIAN ASSISTANT

## 2020-08-14 PROCEDURE — 2580000003 HC RX 258: Performed by: PHYSICIAN ASSISTANT

## 2020-08-14 PROCEDURE — 80053 COMPREHEN METABOLIC PANEL: CPT

## 2020-08-14 PROCEDURE — C9113 INJ PANTOPRAZOLE SODIUM, VIA: HCPCS | Performed by: PHYSICIAN ASSISTANT

## 2020-08-14 PROCEDURE — 85027 COMPLETE CBC AUTOMATED: CPT

## 2020-08-14 RX ORDER — POTASSIUM CHLORIDE 7.45 MG/ML
10 INJECTION INTRAVENOUS PRN
Status: DISCONTINUED | OUTPATIENT
Start: 2020-08-14 | End: 2020-08-16 | Stop reason: HOSPADM

## 2020-08-14 RX ORDER — POTASSIUM CHLORIDE 20 MEQ/1
40 TABLET, EXTENDED RELEASE ORAL PRN
Status: DISCONTINUED | OUTPATIENT
Start: 2020-08-14 | End: 2020-08-16 | Stop reason: HOSPADM

## 2020-08-14 RX ADMIN — OCTREOTIDE ACETATE 50 MCG/HR: 500 INJECTION, SOLUTION INTRAVENOUS; SUBCUTANEOUS at 03:13

## 2020-08-14 RX ADMIN — CETIRIZINE HYDROCHLORIDE 10 MG: 10 TABLET, FILM COATED ORAL at 09:02

## 2020-08-14 RX ADMIN — SUCRALFATE 1 G: 1 TABLET ORAL at 21:13

## 2020-08-14 RX ADMIN — POTASSIUM CHLORIDE 40 MEQ: 1500 TABLET, EXTENDED RELEASE ORAL at 14:14

## 2020-08-14 RX ADMIN — SUCRALFATE 1 G: 1 TABLET ORAL at 06:00

## 2020-08-14 RX ADMIN — ACETAMINOPHEN 650 MG: 325 TABLET ORAL at 03:13

## 2020-08-14 RX ADMIN — SODIUM CHLORIDE 8 MG/HR: 9 INJECTION, SOLUTION INTRAVENOUS at 03:13

## 2020-08-14 RX ADMIN — SUCRALFATE 1 G: 1 TABLET ORAL at 11:19

## 2020-08-14 RX ADMIN — SODIUM CHLORIDE 8 MG/HR: 9 INJECTION, SOLUTION INTRAVENOUS at 14:15

## 2020-08-14 RX ADMIN — CEFTRIAXONE SODIUM 1 G: 1 INJECTION, POWDER, FOR SOLUTION INTRAMUSCULAR; INTRAVENOUS at 11:19

## 2020-08-14 RX ADMIN — SODIUM CHLORIDE, PRESERVATIVE FREE 10 ML: 5 INJECTION INTRAVENOUS at 09:03

## 2020-08-14 RX ADMIN — MONTELUKAST SODIUM 10 MG: 10 TABLET, FILM COATED ORAL at 21:13

## 2020-08-14 RX ADMIN — ALLOPURINOL 300 MG: 300 TABLET ORAL at 09:02

## 2020-08-14 RX ADMIN — SUCRALFATE 1 G: 1 TABLET ORAL at 18:07

## 2020-08-14 RX ADMIN — SODIUM CHLORIDE: 9 INJECTION, SOLUTION INTRAVENOUS at 03:56

## 2020-08-14 RX ADMIN — OCTREOTIDE ACETATE 50 MCG/HR: 500 INJECTION, SOLUTION INTRAVENOUS; SUBCUTANEOUS at 14:15

## 2020-08-14 ASSESSMENT — PAIN DESCRIPTION - PROGRESSION

## 2020-08-14 ASSESSMENT — PAIN DESCRIPTION - DESCRIPTORS: DESCRIPTORS: SHARP

## 2020-08-14 ASSESSMENT — PAIN SCALES - GENERAL
PAINLEVEL_OUTOF10: 3
PAINLEVEL_OUTOF10: 5
PAINLEVEL_OUTOF10: 0
PAINLEVEL_OUTOF10: 0

## 2020-08-14 ASSESSMENT — PAIN DESCRIPTION - FREQUENCY: FREQUENCY: CONTINUOUS

## 2020-08-14 ASSESSMENT — PAIN DESCRIPTION - LOCATION: LOCATION: ARM

## 2020-08-14 ASSESSMENT — PAIN - FUNCTIONAL ASSESSMENT: PAIN_FUNCTIONAL_ASSESSMENT: ACTIVITIES ARE NOT PREVENTED

## 2020-08-14 ASSESSMENT — PAIN DESCRIPTION - ORIENTATION: ORIENTATION: RIGHT

## 2020-08-14 ASSESSMENT — PAIN DESCRIPTION - ONSET: ONSET: ON-GOING

## 2020-08-14 ASSESSMENT — PAIN DESCRIPTION - PAIN TYPE: TYPE: ACUTE PAIN

## 2020-08-14 NOTE — CARE COORDINATION
8/14/20, 2:36 PM EDT    DISCHARGE ON GOING [address] day: 3  Location: 8B-30/030-A Reason for admit: GI hemorrhage [K92.2]   Procedure:   Procedure:   8/11 Intubated  8/11 EGD: Large esophageal varices with small nipple near GEJ, likely source of GIB, 3 bands placed; stomach - fundus full of blood and clot; duodenum was normal  8/11 CVC right subclavian  8/12 Extubated  Treatment Plan of Care: Transferred from ICU post GI hemorrhage. Hospitalist and GI following. Remains on octreotide. Plan to continue with protonix gtt and transition to PO tomorrow per GI note. hgb 7.7. Barriers to Discharge: Await medical clearance. PCP: Carolina Pendleton  Readmission Risk Score: 19%  Patient Goals/Plan/Treatment Preferences: Plans home with wife, denied needs.

## 2020-08-14 NOTE — PLAN OF CARE
Problem: Discharge Planning:  Goal: Participates in care planning  Description: Participates in care planning  Outcome: Ongoing  Note: Pt plans is care planning     Problem: Discharge Planning:  Goal: Discharged to appropriate level of care  Description: Discharged to appropriate level of care  Outcome: Ongoing  Note: Pt plans to discharge home      Problem: Airway Clearance - Ineffective:  Goal: Ability to maintain a clear airway will improve  Description: Ability to maintain a clear airway will improve  Outcome: Ongoing  Note: Pt maintains a clear airway     Problem: Anxiety/Stress:  Goal: Level of anxiety will decrease  Description: Level of anxiety will decrease  Outcome: Ongoing  Note: Pt doesn't state any anxiety at this time      Problem: Aspiration:  Goal: Absence of aspiration  Description: Absence of aspiration  Outcome: Ongoing  Note: No aspiration at this time     Problem: Bowel Function - Altered:  Goal: Bowel elimination is within specified parameters  Description: Bowel elimination is within specified parameters  Outcome: Ongoing  Note: Pt has not had a bowel movement this shift     Problem: Cardiac Output - Decreased:  Goal: Hemodynamic stability will improve  Description: Hemodynamic stability will improve  Outcome: Ongoing  Note: Vs and labs being monitored      Problem: Nutrition Deficit:  Goal: Ability to achieve adequate nutritional intake will improve  Description: Ability to achieve adequate nutritional intake will improve  Outcome: Ongoing  Note: Pt is npo with ice chips iv fluids running     Problem: Pain:  Goal: Pain level will decrease  Description: Pain level will decrease  Outcome: Ongoing  Note: Pt states a pain level of 0/10. Pain goal of 0/10. Prn medications ordered.  Non pharm interventions are rest and reposition     Problem: Pain:  Goal: Recognizes and communicates pain  Description: Recognizes and communicates pain  Outcome: Ongoing  Note: No pain stated at this time Problem: Pain:  Goal: Control of acute pain  Description: Control of acute pain  Outcome: Ongoing  Note: Pt states a pain level of 0/10. Pain goal of 0/10. Prn medications ordered. Non pharm interventions are rest and reposition     Problem: Pain:  Goal: Control of chronic pain  Description: Control of chronic pain  Outcome: Ongoing  Note: No chronic pain at this time     Problem: Serum Glucose Level - Abnormal:  Goal: Ability to maintain appropriate glucose levels will improve to within specified parameters  Description: Ability to maintain appropriate glucose levels will improve to within specified parameters  Outcome: Ongoing  Note: Glucose levels are being monitored bu the pt is npo     Problem: Skin Integrity - Impaired:  Goal: Will show no infection signs and symptoms  Description: Will show no infection signs and symptoms  Outcome: Ongoing  Note: Labs and vs being monitored at this time     Problem: Skin Integrity - Impaired:  Goal: Absence of new skin breakdown  Description: Absence of new skin breakdown  Outcome: Ongoing  Note: No new skin break down at this time     Problem: Falls - Risk of:  Goal: Will remain free from falls  Description: Will remain free from falls  Outcome: Ongoing  Note: Pt has remained free from falls this shift. Fall precautions in place are falling star magnet, non slip socks, bed alarm on, call light/bed side table within reach. Pt visualized with hourly roundings.  Will continue to monitor     Problem: Falls - Risk of:  Goal: Absence of physical injury  Description: Absence of physical injury  Outcome: Ongoing  Note: Pt has remained free from physical injury     Problem: Skin Integrity:  Goal: Will show no infection signs and symptoms  Description: Will show no infection signs and symptoms  Outcome: Ongoing  Note: Labs and vs being monitored     Problem: Skin Integrity:  Goal: Absence of new skin breakdown  Description: Absence of new skin breakdown  Outcome: Ongoing  Note: No new skin break down at this time     Care plan reviewed with patient. Patient verbalizes understanding of the plan of care and contribute to goal setting.

## 2020-08-14 NOTE — PROGRESS NOTES
Hospitalist Progress Note    Patient:  Alok Byers      Unit/Bed:8B-30/030-A    YOB: 1955    MRN: [id number]       Acct: [de-identified]     PCP: Elza Whalen    Date of Admission: 8/11/2020    Assessment/Plan:    1. Acute upper GI bleed--had EGD 8/11~large esophageal varices with small nipple near GEJ, likely source of GIB, 3 bands placed; stomach full of blood and clot; on octreotide and Protonix; Rocephin 8/12 secondary to the variceal bleed no further stools; plan is to continue the Protonix and octreotide infusion until tomorrow, monitor the H&H, start clear liquids, Carafate 4 times a day; patient will need a repeat EGD to monitor the varices as outpatient in a few months  2. Acute blood loss anemia--received 1 unit PRBC on 8/11; H/H 7.8/25.0; monitor closely  3. Hypovolemic shock--secondary to #1 and #2; required pressors which was successfully weaned  4. Acute postop pulmonary insufficiency--resolved; required intubation for EGD on 8/11 and was successfully extubated on 8/12; on room air  5. Diabetes mellitus type 2, uncontrolled--on SSI #3 every 4 hours; monitor  6. Thrombocytopenia--at 78 today; monitor  7. Cirrhosis--he quit drinking 1 month ago  8. Essential hypertension, uncontrolled  9.  ROSY--CPAP    Expected discharge date: 2 to 3 days    Disposition:    [x] Home       [] TCU       [] Rehab       [] Psych       [] SNF       [] [place]       [] Other-    Chief Complaint: GI bleed    Hospital Course: Patient was admitted on August 11 secondary to a 1 day history of vomiting bright red blood; states he developed some nausea that day and then had 2 episodes of barbara bloody emesis; hemoglobin dropped approximately 3.7 g; patient complained of dizziness; GI saw and planning EGD however requested the patient be intubated for the procedure; patient was started on octreotide and Protonix drip as the patient was noted to have an upper GI bleed with esophageal varices; his hemoglobin did drop and was transfused with 1 unit of packed red blood cells; he also became thrombocytopenic; he does have a history of alcohol use and stopped 1 month ago and he also has cirrhosis; currently the patient is just complaining of feeling hungry, he offers no complaints of lightheadedness or dizziness however he has not been up yet; he feels good, wife at the bedside    Subjective (past 24 hours): Just complains of being hungry; no further stools, no further emesis    Medications:  Reviewed    Infusion Medications    sodium chloride 100 mL/hr at 08/14/20 0356    pantoprozole (PROTONIX) infusion 8 mg/hr (08/14/20 0313)    octreotide (SANDOSTATIN) infusion 50 mcg/hr (08/14/20 0313)    dextrose       Scheduled Medications    [START ON 8/16/2020] Dulaglutide  1.5 mg Subcutaneous Weekly    cefTRIAXone (ROCEPHIN) IV  1 g Intravenous Q24H    sucralfate  1 g Oral 4x Daily AC & HS    insulin lispro  0-18 Units Subcutaneous Q4H    allopurinol  300 mg Oral Daily    cetirizine  10 mg Oral Daily    [Held by provider] lisinopril  10 mg Oral Daily    montelukast  10 mg Oral Nightly    sodium chloride flush  10 mL Intravenous 2 times per day     PRN Meds: sodium chloride flush, acetaminophen **OR** acetaminophen, promethazine **OR** ondansetron, glucose, dextrose, glucagon (rDNA), dextrose      Intake/Output Summary (Last 24 hours) at 8/14/2020 0704  Last data filed at 8/14/2020 0416  Gross per 24 hour   Intake 2929.43 ml   Output 1030 ml   Net 1899.43 ml       Diet:  Diet NPO Effective Now Exceptions are: Ice Chips    Exam:  /60   Pulse 88   Temp 97.3 °F (36.3 °C) (Oral)   Resp 16   Ht 6' (1.829 m)   Wt 230 lb 9.6 oz (104.6 kg)   SpO2 94%   BMI 31.28 kg/m²     General appearance: No apparent distress, appears stated age and cooperative. HEENT: Pupils equal, round, and reactive to light. Conjunctivae/corneas clear. Neck: Supple, with full range of motion. No jugular venous distention. Trachea midline. Respiratory:  Normal respiratory effort. Clear to auscultation, bilaterally without Rales/Wheezes/Rhonchi. Cardiovascular: Regular rate and rhythm with normal S1/S2 without murmurs, rubs or gallops. Abdomen: Soft, non-tender, non-distended with normal bowel sounds. obese  Musculoskeletal: passive and active ROM x 4 extremities; BUE edema with R>L. Skin: Skin color, texture, turgor normal.    Neurologic:  Neurovascularly intact without any focal sensory/motor deficits. Cranial nerves: II-XII intact, grossly non-focal.  Psychiatric: Alert and oriented, thought content appropriate  Capillary Refill: Brisk,< 3 seconds   Peripheral Pulses: +2 palpable, equal bilaterally       Labs:   Recent Labs     08/11/20  1545  08/12/20  0415  08/13/20  1023 08/13/20  1849 08/13/20  2352 08/14/20  0604   WBC 10.2  --  20.6*  --  5.1  --   --   --    HGB 12.9*   < > 9.3*   < > 7.1*  7.2* 7.4* 7.6* 7.8*   HCT 38.4*   < > 29.9*   < > 22.0*  22.1* 23.2* 24.0* 25.0*     --  193  --  76*  --   --   --     < > = values in this interval not displayed. Recent Labs     08/12/20  0415 08/12/20  1515 08/13/20  0330    142 145   K 5.5* 4.2 3.6   * 116* 118*   CO2 16* 17* 20*   BUN 47* 37* 26*   CREATININE 1.0 1.1 0.8   CALCIUM 7.1* 7.4* 7.4*   PHOS  --  2.0*  --      Recent Labs     08/11/20  1545   AST 50*   ALT 70*   BILITOT 1.1*   ALKPHOS 94     Recent Labs     08/11/20  1545 08/13/20  1500   INR 1.29* 1.42*     Recent Labs     08/11/20  1545   TROPONINI < 0.017     Microbiology:    FOB positive  MRSA screen by PCR is negative  Urine culture is pending negative    Urinalysis:    No results found for: NITRU, WBCUA, BACTERIA, RBCUA, BLOODU, [address]    Radiology:  US LIVER   Final Result      1. Heterogenous echotexture of the liver with a nodular contour, findings consistent with cirrhotic changes. 2. The portal vein is patent and demonstrates normal hepatopedal flow.    3. Cholelithiasis. **This report has been created using voice recognition software. It may contain minor errors which are inherent in voice recognition technology. **      Final report electronically signed by Dr Ar Thornton on 8/12/2020 1:16 PM      US DUP ABD PEL RETRO SCROT COMPLETE   Final Result      1. Heterogenous echotexture of the liver with a nodular contour, findings consistent with cirrhotic changes. 2. The portal vein is patent and demonstrates normal hepatopedal flow. 3. Cholelithiasis. **This report has been created using voice recognition software. It may contain minor errors which are inherent in voice recognition technology. **      Final report electronically signed by Dr Ar Thornton on 8/12/2020 1:16 PM      XR CHEST PORTABLE   Final Result   1. Status post endotracheal tube and central venous catheter placements discussed above. 2. Slightly decreased volumes with infrahilar atelectasis. **This report has been created using voice recognition software. It may contain minor errors which are inherent in voice recognition technology. **      Final report electronically signed by Dr. Tami Jama on 8/11/2020 10:45 PM      XR CHEST PORTABLE   Final Result   Probable mild left basilar subsegmental atelectasis. **This report has been created using voice recognition software. It may contain minor errors which are inherent in voice recognition technology. **      Final report electronically signed by Dr. Izzy Griffith MD on 8/11/2020 4:22 PM          DVT prophylaxis: [] Lovenox                                 [x] SCDs                                 [] SQ Heparin                                 [] Encourage ambulation           [] Already on Anticoagulation     Code Status: Full Code    Tele:   [] yes             [x] no    Active Hospital Problems    Diagnosis Date Noted    GI hemorrhage [K92.2] 08/11/2020       Electronically signed by NAOMIE Lama - CNP on 8/14/2020 at 7:04 AM

## 2020-08-14 NOTE — PROGRESS NOTES
Pt Name: Klaus Amador  MRN: [id number]  [id number]  YOB: 1955  Admit Date: 8/11/2020  3:20 PM  Date of evaluation: 8/14/2020  Primary Care Physician: Mello Araujo   8B-30/030-A   Dictating for Dr Vianca Watson sitting up in chair. Wife at bedside. Denies abd pain, nausea, vomiting. No BM today or yesterday. No bleeding reported. Discussed plans. O  BP (!) 111/58   Pulse 91   Temp 97.9 °F (36.6 °C) (Oral)   Resp 16   Ht 6' (1.829 m)   Wt 230 lb 9.6 oz (104.6 kg)   SpO2 96%   BMI 31.28 kg/m²   VSS, afebrile  Alert and oriented  Resp; CTAB  Chest: RRR  Abd: soft, non-tender, non-tender, non-distended with active BS'S  Ext; + edema      Assessment:  1. UGIB with Hematemesis and Melena due to esophageal varices, no melena or hematochezia for 2 days. Last episode at 1700 8-  2. Acute blood loss anemia, hgb 7.8, s/p 2 units pRBC, last one 8-  3. Large esophageal varices with small nipple near GEJ, likely source of GIB, 3 bands placed 8-. Fundus was full of blood and clots. 4. Cirrhosis (new diagnosis), possibly due to fatty liver, hemachromatosis or alcohol. Hepatopathy labs done in office recently normal.  AFP normal.    5. Leukocytosis , resolved  6. Low grade fever, resolved  7. Tachycardia, improving  8. H/o hemochromatosis. Has not required phlebotomy in 2 years. 9. Hypoalbuminemia   10. Thrombocytopenia  11. ^transaminases, mild  12. [place]        Recent CT scan 7- liver was mildly enlarged and lobulated contour.  Splenomegaly had worsened since prior exam.       Plan  1. PPI infusion continue until tomorrow  2. Octreotide infusion, continue until tomorrow  3. H & H every 6 hours  4. Carafate 4 times daily, dissolve in water  5. Start Clear liquids   6. Supportive care per primary  7. Transfuse as needed  8. Pt will need repeat EGD to monitor varices as outpatient in few months  9. Rocephin due to variceal bleed  10.  Follow        Assessment and plan discussed with Dr. Elke Rosario, APRN, CNP, 8/14/2020, 8:59 AM  .

## 2020-08-15 LAB
ALBUMIN SERPL-MCNC: 2.7 G/DL (ref 3.5–5.1)
ALP BLD-CCNC: 62 U/L (ref 38–126)
ALT SERPL-CCNC: 33 U/L (ref 11–66)
ANION GAP SERPL CALCULATED.3IONS-SCNC: 7 MEQ/L (ref 8–16)
AST SERPL-CCNC: 42 U/L (ref 5–40)
BILIRUB SERPL-MCNC: 0.7 MG/DL (ref 0.3–1.2)
BILIRUBIN DIRECT: 0.3 MG/DL (ref 0–0.3)
BUN BLDV-MCNC: 13 MG/DL (ref 7–22)
CALCIUM SERPL-MCNC: 7.3 MG/DL (ref 8.5–10.5)
CHLORIDE BLD-SCNC: 114 MEQ/L (ref 98–111)
CO2: 22 MEQ/L (ref 23–33)
CREAT SERPL-MCNC: 0.7 MG/DL (ref 0.4–1.2)
ERYTHROCYTE [DISTWIDTH] IN BLOOD BY AUTOMATED COUNT: 17.5 % (ref 11.5–14.5)
ERYTHROCYTE [DISTWIDTH] IN BLOOD BY AUTOMATED COUNT: 60.8 FL (ref 35–45)
GFR SERPL CREATININE-BSD FRML MDRD: > 90 ML/MIN/1.73M2
GLUCOSE BLD-MCNC: 127 MG/DL (ref 70–108)
GLUCOSE BLD-MCNC: 159 MG/DL (ref 70–108)
GLUCOSE BLD-MCNC: 166 MG/DL (ref 70–108)
GLUCOSE BLD-MCNC: 202 MG/DL (ref 70–108)
GLUCOSE BLD-MCNC: 226 MG/DL (ref 70–108)
HCT VFR BLD CALC: 23.6 % (ref 42–52)
HCT VFR BLD CALC: 26.1 % (ref 42–52)
HEMOGLOBIN: 7.2 GM/DL (ref 14–18)
HEMOGLOBIN: 8 GM/DL (ref 14–18)
INR BLD: 1.26 (ref 0.85–1.13)
MCH RBC QN AUTO: 30.6 PG (ref 26–33)
MCHC RBC AUTO-ENTMCNC: 30.5 GM/DL (ref 32.2–35.5)
MCV RBC AUTO: 100.4 FL (ref 80–94)
PLATELET # BLD: 76 THOU/MM3 (ref 130–400)
PMV BLD AUTO: 11.5 FL (ref 9.4–12.4)
POTASSIUM SERPL-SCNC: 3.3 MEQ/L (ref 3.5–5.2)
RBC # BLD: 2.35 MILL/MM3 (ref 4.7–6.1)
SODIUM BLD-SCNC: 143 MEQ/L (ref 135–145)
TOTAL PROTEIN: 4.4 G/DL (ref 6.1–8)
WBC # BLD: 3.9 THOU/MM3 (ref 4.8–10.8)

## 2020-08-15 PROCEDURE — 85027 COMPLETE CBC AUTOMATED: CPT

## 2020-08-15 PROCEDURE — 2580000003 HC RX 258: Performed by: INTERNAL MEDICINE

## 2020-08-15 PROCEDURE — 36415 COLL VENOUS BLD VENIPUNCTURE: CPT

## 2020-08-15 PROCEDURE — 80053 COMPREHEN METABOLIC PANEL: CPT

## 2020-08-15 PROCEDURE — 85018 HEMOGLOBIN: CPT

## 2020-08-15 PROCEDURE — 85610 PROTHROMBIN TIME: CPT

## 2020-08-15 PROCEDURE — 2580000003 HC RX 258: Performed by: NURSE PRACTITIONER

## 2020-08-15 PROCEDURE — 6360000002 HC RX W HCPCS: Performed by: NURSE PRACTITIONER

## 2020-08-15 PROCEDURE — 82948 REAGENT STRIP/BLOOD GLUCOSE: CPT

## 2020-08-15 PROCEDURE — 6370000000 HC RX 637 (ALT 250 FOR IP): Performed by: NURSE PRACTITIONER

## 2020-08-15 PROCEDURE — 85014 HEMATOCRIT: CPT

## 2020-08-15 PROCEDURE — 99232 SBSQ HOSP IP/OBS MODERATE 35: CPT | Performed by: PHYSICIAN ASSISTANT

## 2020-08-15 PROCEDURE — 6370000000 HC RX 637 (ALT 250 FOR IP): Performed by: INTERNAL MEDICINE

## 2020-08-15 PROCEDURE — 2580000003 HC RX 258: Performed by: PHYSICIAN ASSISTANT

## 2020-08-15 PROCEDURE — 6360000002 HC RX W HCPCS: Performed by: INTERNAL MEDICINE

## 2020-08-15 PROCEDURE — 82248 BILIRUBIN DIRECT: CPT

## 2020-08-15 PROCEDURE — 1200000003 HC TELEMETRY R&B

## 2020-08-15 PROCEDURE — 6370000000 HC RX 637 (ALT 250 FOR IP): Performed by: PHYSICIAN ASSISTANT

## 2020-08-15 RX ORDER — PANTOPRAZOLE SODIUM 40 MG/1
40 TABLET, DELAYED RELEASE ORAL
Status: DISCONTINUED | OUTPATIENT
Start: 2020-08-15 | End: 2020-08-16

## 2020-08-15 RX ADMIN — OCTREOTIDE ACETATE 50 MCG/HR: 500 INJECTION, SOLUTION INTRAVENOUS; SUBCUTANEOUS at 07:34

## 2020-08-15 RX ADMIN — CETIRIZINE HYDROCHLORIDE 10 MG: 10 TABLET, FILM COATED ORAL at 08:34

## 2020-08-15 RX ADMIN — SUCRALFATE 1 G: 1 TABLET ORAL at 10:21

## 2020-08-15 RX ADMIN — MONTELUKAST SODIUM 10 MG: 10 TABLET, FILM COATED ORAL at 20:35

## 2020-08-15 RX ADMIN — SUCRALFATE 1 G: 1 TABLET ORAL at 20:35

## 2020-08-15 RX ADMIN — CEFTRIAXONE SODIUM 1 G: 1 INJECTION, POWDER, FOR SOLUTION INTRAMUSCULAR; INTRAVENOUS at 10:21

## 2020-08-15 RX ADMIN — SODIUM CHLORIDE, PRESERVATIVE FREE 10 ML: 5 INJECTION INTRAVENOUS at 20:35

## 2020-08-15 RX ADMIN — ALLOPURINOL 300 MG: 300 TABLET ORAL at 08:34

## 2020-08-15 RX ADMIN — SUCRALFATE 1 G: 1 TABLET ORAL at 05:24

## 2020-08-15 RX ADMIN — PANTOPRAZOLE SODIUM 40 MG: 40 TABLET, DELAYED RELEASE ORAL at 18:02

## 2020-08-15 RX ADMIN — POTASSIUM CHLORIDE 40 MEQ: 1500 TABLET, EXTENDED RELEASE ORAL at 05:24

## 2020-08-15 RX ADMIN — SUCRALFATE 1 G: 1 TABLET ORAL at 18:01

## 2020-08-15 ASSESSMENT — PAIN SCALES - GENERAL
PAINLEVEL_OUTOF10: 0

## 2020-08-15 NOTE — PLAN OF CARE
Problem: Discharge Planning:  Goal: Participates in care planning  Description: Participates in care planning  Outcome: Ongoing  Note: Pt and spouse updated on plan of care. Problem: Discharge Planning:  Goal: Discharged to appropriate level of care  Description: Discharged to appropriate level of care  Outcome: Ongoing  Note: Pt plans to be discharged home when appropriate. Problem: Airway Clearance - Ineffective:  Goal: Ability to maintain a clear airway will improve  Description: Ability to maintain a clear airway will improve  Outcome: Ongoing  Note: Pt able to maintain clear airway. Problem: Anxiety/Stress:  Goal: Level of anxiety will decrease  Description: Level of anxiety will decrease  Outcome: Ongoing  Note: Patient has anxiety this shift. PRN medications given as given. Calm environment and lights dimmed. Problem: Aspiration:  Goal: Absence of aspiration  Description: Absence of aspiration  Outcome: Ongoing  Note: Pt up in chair for meals. Problem: Bowel Function - Altered:  Goal: Bowel elimination is within specified parameters  Description: Bowel elimination is within specified parameters  Outcome: Ongoing  Note: Monitoring pt bowel movements. Pt has no concerns voiced. H&H monitored per order. Problem: Cardiac Output - Decreased:  Goal: Hemodynamic stability will improve  Description: Hemodynamic stability will improve  Outcome: Ongoing  Note: Patient hemodynamically stable this shift. Monitoring H&H every 6 hours. Problem: Nutrition Deficit:  Goal: Ability to achieve adequate nutritional intake will improve  Description: Ability to achieve adequate nutritional intake will improve  Outcome: Ongoing  Note: Pt tolerating clear liquids. Problem: Pain:  Goal: Pain level will decrease  Description: Pain level will decrease  Outcome: Ongoing  Note: Pt complains of pain at a 0/10. Non pharmacological interventions completed which include distraction, and repositioning.  Pt states pain goal at a 0/10. Pain assessed every 4 hours, and as needed. PRN pain medications given as ordered. Problem: Pain:  Goal: Recognizes and communicates pain  Description: Recognizes and communicates pain  Outcome: Ongoing  Note: Pt able to communicate pain. Problem: Pain:  Goal: Control of acute pain  Description: Control of acute pain  Outcome: Ongoing  Note: Pt complains of pain at a 0/10. Non pharmacological interventions completed which include distraction, and repositioning. Pt states pain goal at a 0/10. Pain assessed every 4 hours, and as needed. PRN pain medications given as ordered. Problem: Pain:  Goal: Control of chronic pain  Description: Control of chronic pain  Outcome: Ongoing  Note: Pt complains of pain at a 0/10. Non pharmacological interventions completed which include distraction, and repositioning. Pt states pain goal at a 0/10. Pain assessed every 4 hours, and as needed. PRN pain medications given as ordered. Problem: Serum Glucose Level - Abnormal:  Goal: Ability to maintain appropriate glucose levels will improve to within specified parameters  Description: Ability to maintain appropriate glucose levels will improve to within specified parameters  Outcome: Ongoing  Note: Blood sugar checked before meals and at bedtime. PRN coverage given as needed. Problem: Skin Integrity - Impaired:  Goal: Will show no infection signs and symptoms  Description: Will show no infection signs and symptoms  Outcome: Ongoing  Note: No new skin breakdown noted this shift. Pt encouraged to turn and reposition self often to prevent skin breakdown. Problem: Skin Integrity - Impaired:  Goal: Absence of new skin breakdown  Description: Absence of new skin breakdown  Outcome: Ongoing  Note: No new skin breakdown noted this shift.      Problem: Falls - Risk of:  Goal: Will remain free from falls  Description: Will remain free from falls  Outcome: Ongoing  Note: Absence of falls this

## 2020-08-15 NOTE — PROGRESS NOTES
13. Hx Gout: cont home allopurinol. Chief Complaint: GI bleed    HPI / Hospital Course: \"Patient was admitted on August 11 secondary to a 1 day history of vomiting bright red blood; states he developed some nausea that day and then had 2 episodes of barbara bloody emesis; hemoglobin dropped approximately 3.7 g; patient complained of dizziness; GI saw and planning EGD however requested the patient be intubated for the procedure; patient was started on octreotide and Protonix drip as the patient was noted to have an upper GI bleed with esophageal varices; his hemoglobin did drop and was transfused with 1 unit of packed red blood cells; he also became thrombocytopenic; he does have a history of alcohol use and stopped 1 month ago and he also has cirrhosis; currently the patient is just complaining of feeling hungry, he offers no complaints of lightheadedness or dizziness however he has not been up yet; he feels good, wife at the bedside\"     Subjective (past 24 hours): Patient seen sitting up in chair. He is doing well. States abdominal pain and nausea/vomiting have resolved. He is tolerating clear liquid diet. Plan to start Protonix and octreotide drips today and advance diet as tolerated. If patient does okay plan for discharge tomorrow. ROS: Pertinent positives as noted in HPI. All other systems reviewed and negative. Past medical history, family history, social history and allergies reviewed again and is unchanged since admission. Medications:  Reviewed.   Infusion Medications    dextrose       Scheduled Medications    insulin lispro  0-18 Units Subcutaneous 4x Daily AC & HS    pantoprazole  40 mg Oral BID AC    [START ON 8/16/2020] Dulaglutide  1.5 mg Subcutaneous Weekly    sucralfate  1 g Oral 4x Daily AC & HS    allopurinol  300 mg Oral Daily    cetirizine  10 mg Oral Daily    [Held by provider] lisinopril  10 mg Oral Daily    montelukast  10 mg Oral Nightly    sodium chloride flush  10 mL Intravenous 2 times per day     PRN Meds: potassium chloride **OR** potassium alternative oral replacement **OR** potassium chloride, sodium chloride flush, acetaminophen **OR** acetaminophen, promethazine **OR** ondansetron, glucose, dextrose, glucagon (rDNA), dextrose    I/O:     Intake/Output Summary (Last 24 hours) at 8/15/2020 1622  Last data filed at 8/15/2020 1548  Gross per 24 hour   Intake 2944.47 ml   Output 2700 ml   Net 244.47 ml       Diet:  DIET GENERAL;    Exam:  /60   Pulse 82   Temp 98.3 °F (36.8 °C) (Oral)   Resp 16   Ht 6' (1.829 m)   Wt 230 lb 9.6 oz (104.6 kg)   SpO2 96%   BMI 31.28 kg/m²   General:   Pleasant male. NAD  HEENT:  normocephalic and atraumatic. No scleral icterus. PERR. Neck: supple. No JVD. No thyromegaly. Lungs: clear to auscultation. No retractions  Cardiac: RRR without murmur. Abdomen: soft. Nontender. Bowel sounds positive. Extremities:  No clubbing, cyanosis, or edema x 4. Vasculature: capillary refill < 3 seconds. Palpable LE pulses bilaterally. Skin:  warm and dry. Psych:  Alert and oriented x3. Affect appropriate  Lymph:  No supraclavicular adenopathy. Neurologic:  No focal deficit. No seizures. Data: (All radiographs, tracings, PFTs, and imaging are personally viewed and interpreted unless otherwise noted)  Labs:   Recent Labs     08/13/20  1023  08/14/20  0907  08/14/20  2200 08/15/20  0430 08/15/20  1019   WBC 5.1  --  4.1*  --   --  3.9*  --    HGB 7.1*  7.2*   < > 7.6*   < > 7.5* 7.2* 8.0*   HCT 22.0*  22.1*   < > 24.3*   < > 23.8* 23.6* 26.1*   PLT 76*  --  78*  77*  --   --  76*  --     < > = values in this interval not displayed.      Recent Labs     08/13/20  0330 08/14/20  0907 08/15/20  0430    146* 143   K 3.6 3.4* 3.3*   * 116* 114*   CO2 20* 23 22*   BUN 26* 16 13   CREATININE 0.8 0.7 0.7   CALCIUM 7.4* 7.2* 7.3*     Recent Labs     08/14/20  0907 08/15/20  0430   AST 49* 42*   ALT 37 33   BILIDIR --  0.3   BILITOT 0.7 0.7   ALKPHOS 61 62     Recent Labs     08/13/20  1500 08/15/20  0430   INR 1.42* 1.26*     No results for input(s): CKTOTAL, TROPONINI in the last 72 hours. Urinalysis:   No results found for: NITRU, WBCUA, BACTERIA, RBCUA, BLOODU, SPECGRAV, GLUCOSEU  Urine culture:   Lab Results   Component Value Date    LABURIN No growth-preliminary No growth  08/11/2020     Micro:   Blood culture #1: No results found for: BC  Blood culture #2:No results found for: Melanie Hipps  Organism:No results found for: ORG    No results found for: LABGRAM  MRSA culture only:No results found for: [place]  Respiratory culture: No results found for: CULTRESP  Aerobic and Anaerobic :  No results found for: LABAERO  No results found for: [facility]    Radiology Reports:  US LIVER   Final Result      1. Heterogenous echotexture of the liver with a nodular contour, findings consistent with cirrhotic changes. 2. The portal vein is patent and demonstrates normal hepatopedal flow. 3. Cholelithiasis. **This report has been created using voice recognition software. It may contain minor errors which are inherent in voice recognition technology. **      Final report electronically signed by Dr Juarez Guzman on 8/12/2020 1:16 PM      US DUP ABD PEL RETRO SCROT COMPLETE   Final Result      1. Heterogenous echotexture of the liver with a nodular contour, findings consistent with cirrhotic changes. 2. The portal vein is patent and demonstrates normal hepatopedal flow. 3. Cholelithiasis. **This report has been created using voice recognition software. It may contain minor errors which are inherent in voice recognition technology. **      Final report electronically signed by Dr Juarez Guzman on 8/12/2020 1:16 PM      XR CHEST PORTABLE   Final Result   1. Status post endotracheal tube and central venous catheter placements discussed above. 2. Slightly decreased volumes with infrahilar atelectasis. **This report has been created using voice recognition software. It may contain minor errors which are inherent in voice recognition technology. **      Final report electronically signed by Dr. Danica Alanis on 8/11/2020 10:45 PM      XR CHEST PORTABLE   Final Result   Probable mild left basilar subsegmental atelectasis. **This report has been created using voice recognition software. It may contain minor errors which are inherent in voice recognition technology. **      Final report electronically signed by Dr. Tamara Corona MD on 8/11/2020 4:22 PM      VL DUP UPPER EXTREMITY VENOUS RIGHT    (Results Pending)     Us Liver    Result Date: 8/12/2020  PROCEDURE: US LIVER, US DUP ABD PEL RETRO SCROT COMPLETE CLINICAL INFORMATION: 58-year-old male with cirrhosis. Evaluation for portal vein thrombosis. COMPARISON: CT 7/30/2020. TECHNIQUE: Multiplanar sonographic images were obtained of the liver. FINDINGS: Liver - L= 19.2 cm Gallbladder - 5.3 x 2.9 x 2.6 cm Gallbladder Wall - 0.29 cm Common Duct - 0.69 cm Cunningham's Sign: negative The liver demonstrates a heterogenous echotexture and has a nodular contour. There are no liver masses. There is no intrahepatic biliary ductal dilatation. There is normal color flow and spectral analysis in the main portal vein, right and left portal veins, hepatic artery, inferior vena cava and all 3 hepatic veins. The pancreatic head and body are within normal limits. The tail is not well seen due to overlying bowel gas. The gallbladder is not distended. There is no gallbladder wall thickening. No gallstones are identified. The common bile duct is normal for the patient's age and measures 7 mm. There is no hydronephrosis in the visualized aspects of the right kidney. 1. Heterogenous echotexture of the liver with a nodular contour, findings consistent with cirrhotic changes. 2. The portal vein is patent and demonstrates normal hepatopedal flow.  3. Cholelithiasis. **This report has been created using voice recognition software. It may contain minor errors which are inherent in voice recognition technology. ** Final report electronically signed by Dr Rafael Roca on 8/12/2020 1:16 PM    Xr Chest Portable    Result Date: 8/11/2020  PROCEDURE: XR CHEST PORTABLE CLINICAL INFORMATION: ETT and CVC placement. COMPARISON: August 11, 2020 TECHNIQUE: AP upright view of the chest. FINDINGS: The heart and mediastinum are within normal limits. There is an endotracheal tube 5 cm above the placido. There is a right subclavian central line within the superior vena cava. There is no visualized pneumothorax. There are mild perihilar marking changes  correlate with atelectasis. The skeleton is negative for active pathology. Surgical changes of the lower C-spine are present     1. Status post endotracheal tube and central venous catheter placements discussed above. 2. Slightly decreased volumes with infrahilar atelectasis. **This report has been created using voice recognition software. It may contain minor errors which are inherent in voice recognition technology. ** Final report electronically signed by Dr. Tamara Whitaker on 8/11/2020 10:45 PM    Xr Chest Portable    Result Date: 8/11/2020  PROCEDURE: XR CHEST PORTABLE CLINICAL INFORMATION: tachycardia. Hemoptysis. COMPARISON: None available on PACS. TECHNIQUE: AP upright view of the chest. FINDINGS: There is minimal linear opacity at the left lung base. Lungs otherwise appear clear. Pulmonary vasculature and cardiac-based also what are within normal limits. Visualized osseous structures appear grossly intact. Probable mild left basilar subsegmental atelectasis. **This report has been created using voice recognition software. It may contain minor errors which are inherent in voice recognition technology. ** Final report electronically signed by Dr. Dottie Silva MD on 8/11/2020 4:22 PM     Dup Abd Pel Retro Scrot Complete    Result Date: 8/12/2020  PROCEDURE: US LIVER, US DUP ABD PEL RETRO SCROT COMPLETE CLINICAL INFORMATION: 77-year-old male with cirrhosis. Evaluation for portal vein thrombosis. COMPARISON: CT 7/30/2020. TECHNIQUE: Multiplanar sonographic images were obtained of the liver. FINDINGS: Liver - L= 19.2 cm Gallbladder - 5.3 x 2.9 x 2.6 cm Gallbladder Wall - 0.29 cm Common Duct - 0.69 cm Cunningham's Sign: negative The liver demonstrates a heterogenous echotexture and has a nodular contour. There are no liver masses. There is no intrahepatic biliary ductal dilatation. There is normal color flow and spectral analysis in the main portal vein, right and left portal veins, hepatic artery, inferior vena cava and all 3 hepatic veins. The pancreatic head and body are within normal limits. The tail is not well seen due to overlying bowel gas. The gallbladder is not distended. There is no gallbladder wall thickening. No gallstones are identified. The common bile duct is normal for the patient's age and measures 7 mm. There is no hydronephrosis in the visualized aspects of the right kidney. 1. Heterogenous echotexture of the liver with a nodular contour, findings consistent with cirrhotic changes. 2. The portal vein is patent and demonstrates normal hepatopedal flow. 3. Cholelithiasis. **This report has been created using voice recognition software. It may contain minor errors which are inherent in voice recognition technology. ** Final report electronically signed by Dr Leonel Richmond on 8/12/2020 1:16 PM        Tele:   [x] yes             [] no      Active Hospital Problems    Diagnosis Date Noted    GI hemorrhage [K92.2] 08/11/2020       Electronically signed by Eva Buck PA-C on 8/15/2020 at 4:22 PM

## 2020-08-15 NOTE — PROGRESS NOTES
Hospital Follow Up Note  DU[facility]   S   No bm  No bleeding    Current Facility-Administered Medications   Medication Dose Route Frequency Provider Last Rate Last Dose    insulin lispro (HUMALOG) injection vial 0-18 Units  0-18 Units Subcutaneous 4x Daily AC & HS NAOMIE Zabala - CNP   3 Units at 08/15/20 0929    potassium chloride (KLOR-CON M) extended release tablet 40 mEq  40 mEq Oral PRN Alexander Shiver, APRN - CNP   40 mEq at 08/15/20 0524    Or    potassium bicarb-citric acid (EFFER-K) effervescent tablet 40 mEq  40 mEq Oral PRN Alexander Shigokul, APRN - CNP        Or    potassium chloride 10 mEq/100 mL IVPB (Peripheral Line)  10 mEq Intravenous PRN Alexander Ponce, APRN - CNP        [START ON 8/16/2020] Dulaglutide SOPN 1.5 mg  1.5 mg Subcutaneous Weekly Cloteal MD Jesus        cefTRIAXone (ROCEPHIN) 1 g IVPB in 50 mL D5W minibag  1 g Intravenous Q24H NAOMIE Minaya - CNP   Stopped at 08/15/20 1056    sucralfate (CARAFATE) tablet 1 g  1 g Oral 4x Daily AC & HS Get NAOMIE Anne - CNP   1 g at 08/15/20 1021    allopurinol (ZYLOPRIM) tablet 300 mg  300 mg Oral Daily [employer], PA-C   300 mg at 08/15/20 9131    cetirizine (ZYRTEC) tablet 10 mg  10 mg Oral Daily [employer], PA-C   10 mg at 08/15/20 0834    [Held by provider] lisinopril (PRINIVIL;ZESTRIL) tablet 10 mg  10 mg Oral Daily [employer], PA-C        montelukast (SINGULAIR) tablet 10 mg  10 mg Oral Nightly [employer], PA-C   10 mg at 08/14/20 2113    sodium chloride flush 0.9 % injection 10 mL  10 mL Intravenous 2 times per day [employer], PA-C   10 mL at 08/14/20 0903    sodium chloride flush 0.9 % injection 10 mL  10 mL Intravenous PRN [employer], PA-C        acetaminophen (TYLENOL) tablet 650 mg  650 mg Oral Q6H PRN [employer], PA-C   650 mg at 08/14/20 3834    Or    acetaminophen (TYLENOL) suppository 650 mg  650 mg Rectal Q6H PRN Whit Manzo PA-C        promethazine (PHENERGAN) tablet 12.5 mg 12.5 mg Oral Q6H PRN Janette Hein PA-C        Or    ondansetron (ZOFRAN) injection 4 mg  4 mg Intravenous Q6H PRN Janette Hein PA-C   4 mg at 08/12/20 1505    octreotide (SANDOSTATIN) 500 mcg in sodium chloride 0.9 % 100 mL infusion  50 mcg/hr Intravenous Continuous Leeanna Gonsalez MD 10 mL/hr at 08/15/20 0734 50 mcg/hr at 08/15/20 0734    glucose (GLUTOSE) 40 % oral gel 15 g  15 g Oral PRN Sissy Cait, APRN - CNP        dextrose 50 % IV solution  12.5 g Intravenous PRN Sissy Cait, APRN - CNP        glucagon (rDNA) injection 1 mg  1 mg Intramuscular PRN Sissy Cait, APRN - CNP        dextrose 5 % solution  100 mL/hr Intravenous PRN Sissy Cait, APRN - CNP               O.  Afebrile, VSS  height is 6' (1.829 m) and weight is 230 lb 9.6 oz (104.6 kg). His oral temperature is 98.5 °F (36.9 °C). His blood pressure is 121/68 and his pulse is 79. His respiration is 16 and oxygen saturation is 94%. A&O         Heart  RRR         Lungs CTAB          ABD S/NT/ND/+BS         Ext No LLE     CBC:   Recent Labs     08/13/20  1023  08/14/20  0907 08/14/20  2200 08/15/20  0430 08/15/20  1019   WBC 5.1  --  4.1*  --   --  3.9*  --    HGB 7.1*  7.2*   < > 7.6*   < > 7.5* 7.2* 8.0*   PLT 76*  --  78*  77*  --   --  76*  --     < > = values in this interval not displayed. BMP:    Recent Labs     08/13/20  0330 08/14/20  0907 08/15/20  0430    146* 143   K 3.6 3.4* 3.3*   * 116* 114*   CO2 20* 23 22*   BUN 26* 16 13   CREATININE 0.8 0.7 0.7   GLUCOSE 182* 175* 127*     Calcium:  Recent Labs     08/15/20  0430   CALCIUM 7.3*     Ionized Calcium:No results for input(s): IONCA in the last 72 hours. Magnesium:  Recent Labs     08/14/20  0907   MG 2.0     Phosphorus:  Recent Labs     08/12/20  1515   PHOS 2.0*     BNP:No results for input(s): BNP in the last 72 hours.   Glucose:  Recent Labs     08/14/20  1625 08/14/20  2006 08/15/20  0801   POCGLU 208* 164* 166*     HgbA1C: No results

## 2020-08-16 ENCOUNTER — APPOINTMENT (OUTPATIENT)
Dept: INTERVENTIONAL RADIOLOGY/VASCULAR | Age: 65
DRG: 432 | End: 2020-08-16
Payer: MEDICARE

## 2020-08-16 VITALS
SYSTOLIC BLOOD PRESSURE: 125 MMHG | RESPIRATION RATE: 18 BRPM | HEART RATE: 97 BPM | WEIGHT: 230.6 LBS | HEIGHT: 72 IN | DIASTOLIC BLOOD PRESSURE: 66 MMHG | BODY MASS INDEX: 31.23 KG/M2 | TEMPERATURE: 97.8 F | OXYGEN SATURATION: 99 %

## 2020-08-16 LAB
ANION GAP SERPL CALCULATED.3IONS-SCNC: 8 MEQ/L (ref 8–16)
AVERAGE GLUCOSE: 132 MG/DL (ref 70–126)
BUN BLDV-MCNC: 9 MG/DL (ref 7–22)
CALCIUM SERPL-MCNC: 7.7 MG/DL (ref 8.5–10.5)
CHLORIDE BLD-SCNC: 110 MEQ/L (ref 98–111)
CO2: 23 MEQ/L (ref 23–33)
CREAT SERPL-MCNC: 0.7 MG/DL (ref 0.4–1.2)
ERYTHROCYTE [DISTWIDTH] IN BLOOD BY AUTOMATED COUNT: 17.3 % (ref 11.5–14.5)
ERYTHROCYTE [DISTWIDTH] IN BLOOD BY AUTOMATED COUNT: 58.4 FL (ref 35–45)
FOLATE: 18.1 NG/ML (ref 4.8–24.2)
GFR SERPL CREATININE-BSD FRML MDRD: > 90 ML/MIN/1.73M2
GLUCOSE BLD-MCNC: 150 MG/DL (ref 70–108)
GLUCOSE BLD-MCNC: 174 MG/DL (ref 70–108)
GLUCOSE BLD-MCNC: 199 MG/DL (ref 70–108)
HBA1C MFR BLD: 6.4 % (ref 4.4–6.4)
HCT VFR BLD CALC: 25 % (ref 42–52)
HEMOGLOBIN: 7.8 GM/DL (ref 14–18)
MCH RBC QN AUTO: 31 PG (ref 26–33)
MCHC RBC AUTO-ENTMCNC: 31.2 GM/DL (ref 32.2–35.5)
MCV RBC AUTO: 99.2 FL (ref 80–94)
PLATELET # BLD: 81 THOU/MM3 (ref 130–400)
PMV BLD AUTO: 11.8 FL (ref 9.4–12.4)
POTASSIUM SERPL-SCNC: 3.5 MEQ/L (ref 3.5–5.2)
RBC # BLD: 2.52 MILL/MM3 (ref 4.7–6.1)
SODIUM BLD-SCNC: 141 MEQ/L (ref 135–145)
VITAMIN B-12: 1220 PG/ML (ref 211–911)
WBC # BLD: 3.5 THOU/MM3 (ref 4.8–10.8)

## 2020-08-16 PROCEDURE — 36415 COLL VENOUS BLD VENIPUNCTURE: CPT

## 2020-08-16 PROCEDURE — 82746 ASSAY OF FOLIC ACID SERUM: CPT

## 2020-08-16 PROCEDURE — 6370000000 HC RX 637 (ALT 250 FOR IP): Performed by: INTERNAL MEDICINE

## 2020-08-16 PROCEDURE — 6370000000 HC RX 637 (ALT 250 FOR IP): Performed by: PHYSICIAN ASSISTANT

## 2020-08-16 PROCEDURE — 85027 COMPLETE CBC AUTOMATED: CPT

## 2020-08-16 PROCEDURE — 83036 HEMOGLOBIN GLYCOSYLATED A1C: CPT

## 2020-08-16 PROCEDURE — 6370000000 HC RX 637 (ALT 250 FOR IP): Performed by: NURSE PRACTITIONER

## 2020-08-16 PROCEDURE — 82948 REAGENT STRIP/BLOOD GLUCOSE: CPT

## 2020-08-16 PROCEDURE — 82607 VITAMIN B-12: CPT

## 2020-08-16 PROCEDURE — 93971 EXTREMITY STUDY: CPT

## 2020-08-16 PROCEDURE — 80048 BASIC METABOLIC PNL TOTAL CA: CPT

## 2020-08-16 PROCEDURE — 2580000003 HC RX 258: Performed by: PHYSICIAN ASSISTANT

## 2020-08-16 PROCEDURE — 99239 HOSP IP/OBS DSCHRG MGMT >30: CPT | Performed by: PHYSICIAN ASSISTANT

## 2020-08-16 RX ORDER — PANTOPRAZOLE SODIUM 40 MG/1
40 TABLET, DELAYED RELEASE ORAL DAILY
Status: DISCONTINUED | OUTPATIENT
Start: 2020-08-17 | End: 2020-08-16 | Stop reason: HOSPADM

## 2020-08-16 RX ORDER — PANTOPRAZOLE SODIUM 40 MG/1
40 TABLET, DELAYED RELEASE ORAL DAILY
Qty: 30 TABLET | Refills: 3 | Status: ON HOLD | OUTPATIENT
Start: 2020-08-17 | End: 2021-03-27 | Stop reason: SDUPTHER

## 2020-08-16 RX ADMIN — SODIUM CHLORIDE, PRESERVATIVE FREE 10 ML: 5 INJECTION INTRAVENOUS at 08:22

## 2020-08-16 RX ADMIN — SUCRALFATE 1 G: 1 TABLET ORAL at 05:59

## 2020-08-16 RX ADMIN — PANTOPRAZOLE SODIUM 40 MG: 40 TABLET, DELAYED RELEASE ORAL at 06:15

## 2020-08-16 RX ADMIN — CETIRIZINE HYDROCHLORIDE 10 MG: 10 TABLET, FILM COATED ORAL at 08:22

## 2020-08-16 RX ADMIN — ALLOPURINOL 300 MG: 300 TABLET ORAL at 08:22

## 2020-08-16 ASSESSMENT — PAIN DESCRIPTION - LOCATION: LOCATION: ARM

## 2020-08-16 ASSESSMENT — PAIN DESCRIPTION - ORIENTATION: ORIENTATION: RIGHT

## 2020-08-16 ASSESSMENT — PAIN DESCRIPTION - PAIN TYPE: TYPE: ACUTE PAIN

## 2020-08-16 ASSESSMENT — PAIN SCALES - GENERAL: PAINLEVEL_OUTOF10: 8

## 2020-08-16 NOTE — PROGRESS NOTES
Patient d.c home with wife. Patient given d.c instructions. No concerns voiced. Belongings sent home with patient.

## 2020-08-16 NOTE — PROGRESS NOTES
Hospital Follow Up Note  [facility]   S   No gi bleed    Current Facility-Administered Medications   Medication Dose Route Frequency Provider Last Rate Last Dose    [START ON 8/17/2020] pantoprazole (PROTONIX) tablet 40 mg  40 mg Oral Daily Kasia Blas MD        insulin lispro (HUMALOG) injection vial 0-6 Units  0-6 Units Subcutaneous TID  Terrance Gloria PA-C   1 Units at 08/16/20 0917    insulin lispro (HUMALOG) injection vial 0-3 Units  0-3 Units Subcutaneous Nightly Naomie Mendiola PA-C   1 Units at 08/15/20 2035    potassium chloride (KLOR-CON M) extended release tablet 40 mEq  40 mEq Oral PRN Crista Burnette APRN - CNP   40 mEq at 08/15/20 0524    Or    potassium bicarb-citric acid (EFFER-K) effervescent tablet 40 mEq  40 mEq Oral PRN Crista Burnette APRN - CNP        Or    potassium chloride 10 mEq/100 mL IVPB (Peripheral Line)  10 mEq Intravenous PRN Crista Burnette APRN - CNP        Dulaglutide SOPN 1.5 mg  1.5 mg Subcutaneous Weekly Purnima Vila MD        allopurinol (ZYLOPRIM) tablet 300 mg  300 mg Oral Daily [employer], PA-C   300 mg at 08/16/20 7221    cetirizine (ZYRTEC) tablet 10 mg  10 mg Oral Daily [employer], PA-C   10 mg at 08/16/20 9133    [Held by provider] lisinopril (PRINIVIL;ZESTRIL) tablet 10 mg  10 mg Oral Daily [employer], PA-C        montelukast (SINGULAIR) tablet 10 mg  10 mg Oral Nightly [employer], PA-C   10 mg at 08/15/20 2035    sodium chloride flush 0.9 % injection 10 mL  10 mL Intravenous 2 times per day [employer], PA-C   10 mL at 08/16/20 3048    sodium chloride flush 0.9 % injection 10 mL  10 mL Intravenous PRN [employer], PA-C        acetaminophen (TYLENOL) tablet 650 mg  650 mg Oral Q6H PRN [employer], PA-C   650 mg at 08/14/20 9241    Or    acetaminophen (TYLENOL) suppository 650 mg  650 mg Rectal Q6H PRN Whit Decker Keshav, PA-C        promethazine (PHENERGAN) tablet 12.5 mg  12.5 mg Oral Q6H PRN [employer], PA-C        Or   Tahira Joshi last 72 hours. Lactic Acid: No results for input(s): LACTA in the last 72 hours. Troponin: No results for input(s): CKTOTAL, CKMB, TROPONINI in the last 72 hours. Lipids: No results for input(s): CHOL, TRIG, HDL, LDLCALC in the last 72 hours. Invalid input(s): LDL  PT/INR:     Recent Labs     08/13/20  1500 08/15/20  0430   INR 1.42* 1.26*     PTT:   No results for input(s): APTT, PTT in the last 72 hours. A. Cirrhosis  Variceal bleed stable        P.  Home on ppi egd in 3-4 weeks at [facility] with band  Stop carafate  D/e pt and wife

## 2020-08-17 NOTE — CARE COORDINATION
8/17/20, 7:40 AM EDT    Patient goals/plan/ treatment preferences discussed by  and . Patient goals/plan/ treatment preferences reviewed with patient/ family. Patient/ family verbalize understanding of discharge plan and are in agreement with goal/plan/treatment preferences. Understanding was demonstrated using the teach back method. AVS provided by RN at time of discharge, which includes all necessary medical information pertaining to the patients current course of illness, treatment, post-discharge goals of care, and treatment preferences. Planned home with wife, had denied needed. No new services or dme ordered at discharge.         IMM Letter  IMM Letter given to Patient/Family/Significant other/Guardian/POA/by[de-identified]   IMM Letter date given[de-identified] 08/14/20  IMM Letter time given[de-identified] 0921

## 2020-08-18 NOTE — DISCHARGE SUMMARY
Hospitalist Discharge Summary    Patient: Homar Thapa  YOB: 1955  MRN: [id number]   Acct: [de-identified]    Primary Care Physician: Alia Roca date  8/11/2020    Discharge date:  8/17/2020  Disposition: home      Discharge Assessment and Plan:    1. Acute upper GI bleed: had EGD 8/11~large esophageal varices with small nipple near GEJ, likely source of GIB, 3 bands placed; stomach full of blood and clot; on octreotide and Protonix; Rocephin 8/12 secondary to the variceal bleed no further stools. - Protonix and octreotide infusions stopped 8/15. Pt tolerating clears, Hgb stable. Cont Protonix 40mg BID. Hemoglobin stable overnight, diet advanced and tolerated. Okay for discharge from GI standpoint. Repeat CBC outpatient in 5 to 7 days, follow-up with GI/PCP. Patient will need a repeat EGD to monitor the varices as outpatient in a few months     Acute blood loss anemia: D/t #1. Received 1U PRBC 8/11. Anemia work-up unimpressive. Treatment as stated above. Repeat CBC in 5 to 7 days and follow-up with PCP/GI.      3. Hypovolemic shock (Resolved): secondary to #1 and #2; required pressors which was successfully weaned     4. Acute postop pulmonary insufficiency (Resolved): resolved; required intubation for EGD on 8/11 and was successfully extubated on 8/12; on room air     5. ANABELL (resolved): likely prerenal d/t poor perfusion. Resolved with IVF. Lisinopril and metformin resumed     6. NIDDMII: on metformin at home, resume at [place]. HgbA1c 6.4. Follow-up with PCP     7. Pancytopenia: d/t cirrhosis. Monitor.      8. Liver Cirrhosis: Findings on liver ultrasound report (8/12/20) consistent w/ cirrhosis of liver. Portal vein patent on doppler. Pt quit etoh approx 1 month ago. GI following, continues to follow as outpatient.     9. Hx essential HTN: BP stable, resume home meds and follow-up with PCP.     10. ROSY: CPAP     11. Hypokalemia: replace per protocol.      12.  Hx HLD: statin held d/t elevated LFTs.      13. Hx Gout: cont home allopurinol. Chief Complaint on presentation: GI bleed    Initial H&P / Hospital Course: \"Patient was admitted on August 11 secondary to a 1 day history of vomiting bright red blood; states he developed some nausea that day and then had 2 episodes of barbara bloody emesis; hemoglobin dropped approximately 3.7 g; patient complained of dizziness; GI saw and planning EGD however requested the patient be intubated for the procedure; patient was started on octreotide and Protonix drip as the patient was noted to have an upper GI bleed with esophageal varices; his hemoglobin did drop and was transfused with 1 unit of packed red blood cells; he also became thrombocytopenic; he does have a history of alcohol use and stopped 1 month ago and he also has cirrhosis; currently the patient is just complaining of feeling hungry, he offers no complaints of lightheadedness or dizziness however he has not been up yet; he feels good, wife at the bedside\"     8/17-I took over care. States abdominal pain and nausea/vomiting have resolved. He is tolerating clear liquid diet. Plan to stop Protonix and octreotide drips today and advance diet as tolerated. Subjective (day of discharge): Patient is tolerating full diet. He denies abdominal pain, nausea/vomiting, no blood noted in stools. Hemoglobin remained stable overnight. GI okay with discharge. Patient responded well to medical management and is discharged in stable condition with appropriate follow-up. Physical Exam:-  Vitals: No data found. Weight: Weight: 230 lb 9.6 oz (104.6 kg)   24 hour intake/output: No intake or output data in the 24 hours ending 08/18/20 1933    General appearance: No apparent distress, appears stated age and cooperative. HEENT: Normal cephalic, atraumatic without obvious deformity. Pupils equal, round, and reactive to light. Extra ocular muscles intact.  Conjunctivae/corneas clear.  Neck: Supple, with full range of motion. No jugular venous distention. Trachea midline. Respiratory:  Normal respiratory effort. Clear to auscultation, bilaterally without Rales/Wheezes/Rhonchi. Cardiovascular: Regular rate and rhythm with normal S1/S2 without murmurs, rubs or gallops. Abdomen: Soft, non-tender, non-distended with normal bowel sounds. Musculoskeletal:  No clubbing, cyanosis or edema bilaterally. Skin: Skin color, texture, turgor normal.  No rashes or lesions. Neurologic:  Neurovascularly intact without any focal sensory/motor deficits.  Cranial nerves: II-XII intact, grossly non-focal.  Psychiatric: Alert and oriented, thought content appropriate, normal insight  Capillary Refill: Brisk,< 3 seconds   Peripheral Pulses: +2 palpable, equal bilaterally     Labs :  Recent Results (from the past 72 hour(s))   POCT glucose    Collection Time: 08/15/20  7:55 PM   Result Value Ref Range    POC Glucose 226 (H) 70 - 108 mg/dl   CBC    Collection Time: 08/16/20  5:55 AM   Result Value Ref Range    WBC 3.5 (L) 4.8 - 10.8 thou/mm3    RBC 2.52 (L) 4.70 - 6.10 mill/mm3    Hemoglobin 7.8 (L) 14.0 - 18.0 gm/dl    Hematocrit 25.0 (L) 42.0 - 52.0 %    MCV 99.2 (H) 80.0 - 94.0 fL    MCH 31.0 26.0 - 33.0 pg    MCHC 31.2 (L) 32.2 - 35.5 gm/dl    RDW-CV 17.3 (H) 11.5 - 14.5 %    RDW-SD 58.4 (H) 35.0 - 45.0 fL    Platelets 81 (L) 267 - 400 thou/mm3    MPV 11.8 9.4 - 12.4 fL   Basic Metabolic Panel    Collection Time: 08/16/20  5:55 AM   Result Value Ref Range    Sodium 141 135 - 145 meq/L    Potassium 3.5 3.5 - 5.2 meq/L    Chloride 110 98 - 111 meq/L    CO2 23 23 - 33 meq/L    Glucose 150 (H) 70 - 108 mg/dL    BUN 9 7 - 22 mg/dL    CREATININE 0.7 0.4 - 1.2 mg/dL    Calcium 7.7 (L) 8.5 - 10.5 mg/dL   Vitamin B12 & Folate    Collection Time: 08/16/20  5:55 AM   Result Value Ref Range    Vitamin B-12 1220 (H) 211 - 911 pg/mL    Folate 18.1 4.8 - 24.2 ng/mL   Hemoglobin A1C    Collection Time: 08/16/20  5:55 AM Result Value Ref Range    Hemoglobin A1C 6.4 4.4 - 6.4 %    AVERAGE GLUCOSE 132 (H) 70 - 126 mg/dL   Anion Gap    Collection Time: 08/16/20  5:55 AM   Result Value Ref Range    Anion Gap 8.0 8.0 - 16.0 meq/L   Glomerular Filtration Rate, Estimated    Collection Time: 08/16/20  5:55 AM   Result Value Ref Range    Est, Glom Filt Rate >90 ml/min/1.73m2   POCT glucose    Collection Time: 08/16/20  8:29 AM   Result Value Ref Range    POC Glucose 174 (H) 70 - 108 mg/dl   POCT glucose    Collection Time: 08/16/20 12:16 PM   Result Value Ref Range    POC Glucose 199 (H) 70 - 108 mg/dl        Microbiology:    Blood culture #1: No results found for: BC  Blood culture #2:No results found for: BLOODCULT2  Organism:  No results found for: LABGRAM  MRSA culture only:No results found for: [facility]  Urine culture:   Lab Results   Component Value Date    LABURIN No growth-preliminary No growth  08/11/2020     No results found for: ORG   Respiratory culture: No results found for: CULTRESP  Aerobic and Anaerobic :  No results found for: LABAERO  No results found for: LABANAE    Urinalysis:   No results found for: Angella , BACTERIA, RBCUA, Allyne Alonso, Amber [address]    Radiology:  Us Liver    Result Date: 8/12/2020  PROCEDURE: US LIVER, US DUP ABD PEL RETRO SCROT COMPLETE CLINICAL INFORMATION: 12-year-old male with cirrhosis. Evaluation for portal vein thrombosis. COMPARISON: CT 7/30/2020. TECHNIQUE: Multiplanar sonographic images were obtained of the liver. FINDINGS: Liver - L= 19.2 cm Gallbladder - 5.3 x 2.9 x 2.6 cm Gallbladder Wall - 0.29 cm Common Duct - 0.69 cm Cunningham's Sign: negative The liver demonstrates a heterogenous echotexture and has a nodular contour. There are no liver masses. There is no intrahepatic biliary ductal dilatation. There is normal color flow and spectral analysis in the main portal vein, right and left portal veins, hepatic artery, inferior vena cava and all 3 hepatic veins.  The pancreatic head and body are within normal limits. The tail is not well seen due to overlying bowel gas. The gallbladder is not distended. There is no gallbladder wall thickening. No gallstones are identified. The common bile duct is normal for the patient's age and measures 7 mm. There is no hydronephrosis in the visualized aspects of the right kidney. 1. Heterogenous echotexture of the liver with a nodular contour, findings consistent with cirrhotic changes. 2. The portal vein is patent and demonstrates normal hepatopedal flow. 3. Cholelithiasis. **This report has been created using voice recognition software. It may contain minor errors which are inherent in voice recognition technology. ** Final report electronically signed by Dr Rafael Good on 8/12/2020 1:16 PM    Xr Chest Portable    Result Date: 8/11/2020  PROCEDURE: XR CHEST PORTABLE CLINICAL INFORMATION: ETT and CVC placement. COMPARISON: August 11, 2020 TECHNIQUE: AP upright view of the chest. FINDINGS: The heart and mediastinum are within normal limits. There is an endotracheal tube 5 cm above the placido. There is a right subclavian central line within the superior vena cava. There is no visualized pneumothorax. There are mild perihilar marking changes  correlate with atelectasis. The skeleton is negative for active pathology. Surgical changes of the lower C-spine are present     1. Status post endotracheal tube and central venous catheter placements discussed above. 2. Slightly decreased volumes with infrahilar atelectasis. **This report has been created using voice recognition software. It may contain minor errors which are inherent in voice recognition technology. ** Final report electronically signed by Dr. Gill Hines on 8/11/2020 10:45 PM    Xr Chest Portable    Result Date: 8/11/2020  PROCEDURE: XR CHEST PORTABLE CLINICAL INFORMATION: tachycardia. Hemoptysis. COMPARISON: None available on PACS.  TECHNIQUE: AP upright view of the chest. FINDINGS: There is minimal linear opacity at the left lung base. Lungs otherwise appear clear. Pulmonary vasculature and cardiac-based also what are within normal limits. Visualized osseous structures appear grossly intact. Probable mild left basilar subsegmental atelectasis. **This report has been created using voice recognition software. It may contain minor errors which are inherent in voice recognition technology. ** Final report electronically signed by Dr. Gely Gill MD on 8/11/2020 4:22 PM    Us Dup Abd Pel Retro Scrot Complete    Result Date: 8/12/2020  PROCEDURE: US LIVER, US DUP ABD PEL RETRO SCROT COMPLETE CLINICAL INFORMATION: 78-year-old male with cirrhosis. Evaluation for portal vein thrombosis. COMPARISON: CT 7/30/2020. TECHNIQUE: Multiplanar sonographic images were obtained of the liver. FINDINGS: Liver - L= 19.2 cm Gallbladder - 5.3 x 2.9 x 2.6 cm Gallbladder Wall - 0.29 cm Common Duct - 0.69 cm Cunningham's Sign: negative The liver demonstrates a heterogenous echotexture and has a nodular contour. There are no liver masses. There is no intrahepatic biliary ductal dilatation. There is normal color flow and spectral analysis in the main portal vein, right and left portal veins, hepatic artery, inferior vena cava and all 3 hepatic veins. The pancreatic head and body are within normal limits. The tail is not well seen due to overlying bowel gas. The gallbladder is not distended. There is no gallbladder wall thickening. No gallstones are identified. The common bile duct is normal for the patient's age and measures 7 mm. There is no hydronephrosis in the visualized aspects of the right kidney. 1. Heterogenous echotexture of the liver with a nodular contour, findings consistent with cirrhotic changes. 2. The portal vein is patent and demonstrates normal hepatopedal flow. 3. Cholelithiasis. **This report has been created using voice recognition software.  It may contain minor errors which are inherent in voice minutes    Signed: Thank you Zahraa Lal for the opportunity to be involved in this patient's care.     Electronically signed by Jamal Morgan PA-C on 8/18/2020 at 7:33 PM  Discharging Hospitalist

## 2020-08-20 ENCOUNTER — HOSPITAL ENCOUNTER (OUTPATIENT)
Age: 65
Setting detail: OBSERVATION
Discharge: HOME | End: 2020-08-21
Attending: FAMILY MEDICINE | Admitting: FAMILY MEDICINE
Payer: MEDICARE

## 2020-08-20 PROBLEM — K62.5 RECTAL BLEED: Status: ACTIVE | Noted: 2020-08-20

## 2020-08-20 LAB
ABO: NORMAL
ALBUMIN SERPL-MCNC: 3.7 G/DL (ref 3.5–5.1)
ALP BLD-CCNC: 156 U/L (ref 38–126)
ALT SERPL-CCNC: 42 U/L (ref 11–66)
ANION GAP SERPL CALCULATED.3IONS-SCNC: 15 MEQ/L (ref 8–16)
ANTIBODY SCREEN: NORMAL
APTT: 29.2 SECONDS (ref 22–38)
AST SERPL-CCNC: 55 U/L (ref 5–40)
BASOPHILS # BLD: 0.6 %
BASOPHILS ABSOLUTE: 0 THOU/MM3 (ref 0–0.1)
BILIRUB SERPL-MCNC: 0.8 MG/DL (ref 0.3–1.2)
BUN BLDV-MCNC: 11 MG/DL (ref 7–22)
CALCIUM SERPL-MCNC: 8.9 MG/DL (ref 8.5–10.5)
CHLORIDE BLD-SCNC: 105 MEQ/L (ref 98–111)
CO2: 19 MEQ/L (ref 23–33)
CREAT SERPL-MCNC: 0.8 MG/DL (ref 0.4–1.2)
EKG ATRIAL RATE: 82 BPM
EKG P AXIS: 49 DEGREES
EKG P-R INTERVAL: 156 MS
EKG Q-T INTERVAL: 398 MS
EKG QRS DURATION: 92 MS
EKG QTC CALCULATION (BAZETT): 464 MS
EKG R AXIS: 7 DEGREES
EKG T AXIS: 34 DEGREES
EKG VENTRICULAR RATE: 82 BPM
EOSINOPHIL # BLD: 1.3 %
EOSINOPHILS ABSOLUTE: 0.1 THOU/MM3 (ref 0–0.4)
ERYTHROCYTE [DISTWIDTH] IN BLOOD BY AUTOMATED COUNT: 16.3 % (ref 11.5–14.5)
ERYTHROCYTE [DISTWIDTH] IN BLOOD BY AUTOMATED COUNT: 57 FL (ref 35–45)
GFR SERPL CREATININE-BSD FRML MDRD: > 90 ML/MIN/1.73M2
GLUCOSE BLD-MCNC: 107 MG/DL (ref 70–108)
GLUCOSE BLD-MCNC: 122 MG/DL (ref 70–108)
HCT VFR BLD CALC: 28.9 % (ref 42–52)
HCT VFR BLD CALC: 31.4 % (ref 42–52)
HEMOCCULT STL QL: POSITIVE
HEMOGLOBIN: 9 GM/DL (ref 14–18)
HEMOGLOBIN: 9.8 GM/DL (ref 14–18)
IMMATURE GRANS (ABS): 0.03 THOU/MM3 (ref 0–0.07)
IMMATURE GRANULOCYTES: 0.6 %
INR BLD: 1.19 (ref 0.85–1.13)
LYMPHOCYTES # BLD: 22.2 %
LYMPHOCYTES ABSOLUTE: 1.2 THOU/MM3 (ref 1–4.8)
MAGNESIUM: 2 MG/DL (ref 1.6–2.4)
MCH RBC QN AUTO: 30.4 PG (ref 26–33)
MCHC RBC AUTO-ENTMCNC: 31.2 GM/DL (ref 32.2–35.5)
MCV RBC AUTO: 97.5 FL (ref 80–94)
MONOCYTES # BLD: 8.3 %
MONOCYTES ABSOLUTE: 0.4 THOU/MM3 (ref 0.4–1.3)
NUCLEATED RED BLOOD CELLS: 0 /100 WBC
OSMOLALITY CALCULATION: 278.2 MOSMOL/KG (ref 275–300)
PLATELET # BLD: 147 THOU/MM3 (ref 130–400)
PMV BLD AUTO: 11.8 FL (ref 9.4–12.4)
POTASSIUM SERPL-SCNC: 3.9 MEQ/L (ref 3.5–5.2)
RBC # BLD: 3.22 MILL/MM3 (ref 4.7–6.1)
RH FACTOR: NORMAL
SEG NEUTROPHILS: 67 %
SEGMENTED NEUTROPHILS ABSOLUTE COUNT: 3.6 THOU/MM3 (ref 1.8–7.7)
SODIUM BLD-SCNC: 139 MEQ/L (ref 135–145)
TOTAL PROTEIN: 6.6 G/DL (ref 6.1–8)
WBC # BLD: 5.3 THOU/MM3 (ref 4.8–10.8)

## 2020-08-20 PROCEDURE — 6360000002 HC RX W HCPCS: Performed by: FAMILY MEDICINE

## 2020-08-20 PROCEDURE — 6360000002 HC RX W HCPCS: Performed by: NURSE PRACTITIONER

## 2020-08-20 PROCEDURE — G0378 HOSPITAL OBSERVATION PER HR: HCPCS

## 2020-08-20 PROCEDURE — 6370000000 HC RX 637 (ALT 250 FOR IP): Performed by: NURSE PRACTITIONER

## 2020-08-20 PROCEDURE — 80053 COMPREHEN METABOLIC PANEL: CPT

## 2020-08-20 PROCEDURE — 2580000003 HC RX 258: Performed by: NURSE PRACTITIONER

## 2020-08-20 PROCEDURE — 86850 RBC ANTIBODY SCREEN: CPT

## 2020-08-20 PROCEDURE — 6370000000 HC RX 637 (ALT 250 FOR IP): Performed by: FAMILY MEDICINE

## 2020-08-20 PROCEDURE — 85014 HEMATOCRIT: CPT

## 2020-08-20 PROCEDURE — 99222 1ST HOSP IP/OBS MODERATE 55: CPT | Performed by: FAMILY MEDICINE

## 2020-08-20 PROCEDURE — 96375 TX/PRO/DX INJ NEW DRUG ADDON: CPT

## 2020-08-20 PROCEDURE — 85610 PROTHROMBIN TIME: CPT

## 2020-08-20 PROCEDURE — 86900 BLOOD TYPING SEROLOGIC ABO: CPT

## 2020-08-20 PROCEDURE — 1200000003 HC TELEMETRY R&B

## 2020-08-20 PROCEDURE — 99285 EMERGENCY DEPT VISIT HI MDM: CPT

## 2020-08-20 PROCEDURE — 82272 OCCULT BLD FECES 1-3 TESTS: CPT

## 2020-08-20 PROCEDURE — 85018 HEMOGLOBIN: CPT

## 2020-08-20 PROCEDURE — 93005 ELECTROCARDIOGRAM TRACING: CPT | Performed by: NURSE PRACTITIONER

## 2020-08-20 PROCEDURE — 85730 THROMBOPLASTIN TIME PARTIAL: CPT

## 2020-08-20 PROCEDURE — 82948 REAGENT STRIP/BLOOD GLUCOSE: CPT

## 2020-08-20 PROCEDURE — 96365 THER/PROPH/DIAG IV INF INIT: CPT

## 2020-08-20 PROCEDURE — 85025 COMPLETE CBC W/AUTO DIFF WBC: CPT

## 2020-08-20 PROCEDURE — 83735 ASSAY OF MAGNESIUM: CPT

## 2020-08-20 PROCEDURE — 2580000003 HC RX 258: Performed by: FAMILY MEDICINE

## 2020-08-20 PROCEDURE — C9113 INJ PANTOPRAZOLE SODIUM, VIA: HCPCS | Performed by: FAMILY MEDICINE

## 2020-08-20 PROCEDURE — 86901 BLOOD TYPING SEROLOGIC RH(D): CPT

## 2020-08-20 PROCEDURE — 36415 COLL VENOUS BLD VENIPUNCTURE: CPT

## 2020-08-20 RX ORDER — SODIUM CHLORIDE 0.9 % (FLUSH) 0.9 %
10 SYRINGE (ML) INJECTION EVERY 12 HOURS SCHEDULED
Status: DISCONTINUED | OUTPATIENT
Start: 2020-08-20 | End: 2020-08-21 | Stop reason: HOSPADM

## 2020-08-20 RX ORDER — NICOTINE POLACRILEX 4 MG
15 LOZENGE BUCCAL PRN
Status: DISCONTINUED | OUTPATIENT
Start: 2020-08-20 | End: 2020-08-21 | Stop reason: HOSPADM

## 2020-08-20 RX ORDER — LISINOPRIL 10 MG/1
10 TABLET ORAL DAILY
Status: DISCONTINUED | OUTPATIENT
Start: 2020-08-20 | End: 2020-08-21 | Stop reason: HOSPADM

## 2020-08-20 RX ORDER — DEXTROSE MONOHYDRATE 25 G/50ML
12.5 INJECTION, SOLUTION INTRAVENOUS PRN
Status: DISCONTINUED | OUTPATIENT
Start: 2020-08-20 | End: 2020-08-21 | Stop reason: HOSPADM

## 2020-08-20 RX ORDER — MONTELUKAST SODIUM 10 MG/1
10 TABLET ORAL NIGHTLY
Status: DISCONTINUED | OUTPATIENT
Start: 2020-08-20 | End: 2020-08-21 | Stop reason: HOSPADM

## 2020-08-20 RX ORDER — ACETAMINOPHEN 650 MG/1
650 SUPPOSITORY RECTAL EVERY 6 HOURS PRN
Status: DISCONTINUED | OUTPATIENT
Start: 2020-08-20 | End: 2020-08-21 | Stop reason: HOSPADM

## 2020-08-20 RX ORDER — ALLOPURINOL 300 MG/1
300 TABLET ORAL DAILY
Status: DISCONTINUED | OUTPATIENT
Start: 2020-08-21 | End: 2020-08-21 | Stop reason: HOSPADM

## 2020-08-20 RX ORDER — DEXTROSE MONOHYDRATE 50 MG/ML
100 INJECTION, SOLUTION INTRAVENOUS PRN
Status: DISCONTINUED | OUTPATIENT
Start: 2020-08-20 | End: 2020-08-21 | Stop reason: HOSPADM

## 2020-08-20 RX ORDER — PROMETHAZINE HYDROCHLORIDE 25 MG/1
12.5 TABLET ORAL EVERY 6 HOURS PRN
Status: DISCONTINUED | OUTPATIENT
Start: 2020-08-20 | End: 2020-08-21 | Stop reason: HOSPADM

## 2020-08-20 RX ORDER — ACETAMINOPHEN 325 MG/1
650 TABLET ORAL EVERY 6 HOURS PRN
Status: DISCONTINUED | OUTPATIENT
Start: 2020-08-20 | End: 2020-08-21 | Stop reason: HOSPADM

## 2020-08-20 RX ORDER — POLYETHYLENE GLYCOL 3350 17 G/17G
17 POWDER, FOR SOLUTION ORAL DAILY PRN
Status: DISCONTINUED | OUTPATIENT
Start: 2020-08-20 | End: 2020-08-21 | Stop reason: HOSPADM

## 2020-08-20 RX ORDER — ONDANSETRON 2 MG/ML
4 INJECTION INTRAMUSCULAR; INTRAVENOUS EVERY 6 HOURS PRN
Status: DISCONTINUED | OUTPATIENT
Start: 2020-08-20 | End: 2020-08-21 | Stop reason: HOSPADM

## 2020-08-20 RX ORDER — POTASSIUM CHLORIDE 20 MEQ/1
40 TABLET, EXTENDED RELEASE ORAL PRN
Status: DISCONTINUED | OUTPATIENT
Start: 2020-08-20 | End: 2020-08-21 | Stop reason: HOSPADM

## 2020-08-20 RX ORDER — POTASSIUM CHLORIDE 7.45 MG/ML
10 INJECTION INTRAVENOUS PRN
Status: DISCONTINUED | OUTPATIENT
Start: 2020-08-20 | End: 2020-08-21 | Stop reason: HOSPADM

## 2020-08-20 RX ORDER — SODIUM CHLORIDE 0.9 % (FLUSH) 0.9 %
10 SYRINGE (ML) INJECTION PRN
Status: DISCONTINUED | OUTPATIENT
Start: 2020-08-20 | End: 2020-08-21 | Stop reason: HOSPADM

## 2020-08-20 RX ORDER — ATORVASTATIN CALCIUM 10 MG/1
10 TABLET, FILM COATED ORAL NIGHTLY
Status: DISCONTINUED | OUTPATIENT
Start: 2020-08-20 | End: 2020-08-21 | Stop reason: HOSPADM

## 2020-08-20 RX ADMIN — SODIUM CHLORIDE, PRESERVATIVE FREE 10 ML: 5 INJECTION INTRAVENOUS at 22:06

## 2020-08-20 RX ADMIN — MONTELUKAST SODIUM 10 MG: 10 TABLET, COATED ORAL at 21:21

## 2020-08-20 RX ADMIN — OCTREOTIDE ACETATE 25 MCG/HR: 500 INJECTION, SOLUTION INTRAVENOUS; SUBCUTANEOUS at 21:37

## 2020-08-20 RX ADMIN — CEFTRIAXONE SODIUM 1 G: 1 INJECTION, POWDER, FOR SOLUTION INTRAMUSCULAR; INTRAVENOUS at 18:16

## 2020-08-20 RX ADMIN — MAGNESIUM HYDROXIDE 30 ML: 2400 SUSPENSION ORAL at 18:16

## 2020-08-20 RX ADMIN — LISINOPRIL 10 MG: 10 TABLET ORAL at 19:42

## 2020-08-20 RX ADMIN — SODIUM CHLORIDE 8 MG/HR: 9 INJECTION, SOLUTION INTRAVENOUS at 18:17

## 2020-08-20 RX ADMIN — ATORVASTATIN CALCIUM 10 MG: 10 TABLET, FILM COATED ORAL at 21:21

## 2020-08-20 ASSESSMENT — ENCOUNTER SYMPTOMS
ABDOMINAL PAIN: 0
ABDOMINAL DISTENTION: 0
SHORTNESS OF BREATH: 0
RHINORRHEA: 0
WHEEZING: 0
DIARRHEA: 0
NAUSEA: 0
EYE DISCHARGE: 0
COUGH: 0
SORE THROAT: 0
EYE PAIN: 0
VOMITING: 0

## 2020-08-20 ASSESSMENT — PAIN SCALES - GENERAL
PAINLEVEL_OUTOF10: 0
PAINLEVEL_OUTOF10: 0

## 2020-08-20 NOTE — ED TRIAGE NOTES
Pt presents to the ED through triage with c/o rectal bleeding. Pt states he was discharged on Sunday after having esophogeal varices. Pt states he had a dark, runny stool on Monday. Pt states he has not had a stool sample on Tuesday or Wednesday. Pt states he had another stool today that was very dark in color, but formed. Pt denies any pain. Vitals stable.

## 2020-08-20 NOTE — ED NOTES
Pt resting on cot with wife at bedside. Pt denies any needs at this time. Vitals remain stable. Call light in reach.  Will continue to monitor      Yenny Tidwell RN  08/20/20 [address], RN  08/20/20 9217

## 2020-08-20 NOTE — ED PROVIDER NOTES
[address] [address]          Pt Name: Sidney Leon  MRN: [id number]  [place] 1955  Date of evaluation: 8/20/2020  Treating Resident Physician: Tamiko Baez MD  Supervising Physician: Vivian Zhou MD    CHIEF COMPLAINT       Chief Complaint   Patient presents with    Rectal Bleeding     History obtained from the patient. HISTORY OF PRESENT ILLNESS    HPI  Sidney Leon is a 75 y.o. male who presents to the emergency department for evaluation for complaint of dark tarry stool. Patient states that he was recently admitted and discharged for esophageal varices bleeding. Patient states that he was discharged on 8/16/2020. Patient states that he had a small loose bowel movement 4 days ago. Patient states that today he had a very large black tarry bowel movement. Patient denies any abdomen pain, abdominal cramping, nausea, or vomiting. The patient has no other acute complaints at this time. REVIEW OF SYSTEMS   Review of Systems   Constitutional: Negative for fatigue and fever. HENT: Negative for congestion, ear pain, rhinorrhea and sore throat. Eyes: Negative for pain and discharge. Respiratory: Negative for cough, shortness of breath and wheezing. Cardiovascular: Negative for chest pain, palpitations and leg swelling. Gastrointestinal: Negative for abdominal distention, abdominal pain, diarrhea, nausea and vomiting. Large dark tarry formed bowel movement this morning   Genitourinary: Negative for difficulty urinating, flank pain and frequency. Musculoskeletal: Negative for arthralgias. Neurological: Negative for dizziness, tremors, syncope, weakness and numbness.          PAST MEDICAL AND SURGICAL HISTORY     Past Medical History:   Diagnosis Date    Diabetes ([address]     GERD (gastroesophageal reflux disease)     Gout     Hyperlipidemia     Hypertension     Seasonal allergies     Sleep apnea      Past HISTORY     Family History   Problem Relation Age of Onset    Heart Disease Mother     Cancer Father         colon    Lung Cancer Father     Heart Disease Brother          PREVIOUS RECORDS   Previous records reviewed today. PHYSICAL EXAM     ED Triage Vitals [08/20/20 1131]   BP Temp Temp Source Pulse Resp SpO2 Height Weight   133/78 98.5 °F (36.9 °C) Oral 97 14 98 % 6' (1.829 m) 215 lb (97.5 kg)     Initial vital signs and nursing assessment reviewed and normal. Pulsoximetry is normal per my interpretation. Additional Vital Signs:  Vitals:    08/20/20 1356   BP: 119/73   Pulse: 95   Resp: 14   Temp:    SpO2: 96%       Physical Exam  Constitutional:       Appearance: Normal appearance. HENT:      Head: Normocephalic. Right Ear: Tympanic membrane normal.      Left Ear: Tympanic membrane normal.      Nose: Nose normal.      Mouth/Throat:      Mouth: Mucous membranes are moist.      Pharynx: Oropharynx is clear. Eyes:      Conjunctiva/sclera: Conjunctivae normal.      Pupils: Pupils are equal, round, and reactive to light. Neck:      Musculoskeletal: Normal range of motion and neck supple. Cardiovascular:      Rate and Rhythm: Normal rate and regular rhythm. Pulses: Normal pulses. Heart sounds: Normal heart sounds. Pulmonary:      Effort: Pulmonary effort is normal.      Breath sounds: Normal breath sounds. Abdominal:      General: Bowel sounds are normal.      Palpations: Abdomen is soft. Comments: Noted normal rectal tone and dark black tarry stool collected for occult blood test   Skin:     General: Skin is warm and dry. Capillary Refill: Capillary refill takes less than 2 seconds. Neurological:      General: No focal deficit present. Mental Status: He is alert and oriented to person, place, and time.              MEDICAL DECISION MAKING   Initial Assessment: gastrointestinal bleeding with melena    Differential diagnosis:.  Gastrointestinal bleeding with melena, esophageal varices, anemia, upper GI bleed, lower GI bleed    Plan: Patient had recent admission and treatment with banding for esophageal varices. Spoke to gastroenterology NP Stephane Umanzor who is returning calls for Dr. Alicia Newton and she stated that they would like him to come in for additional testing to be admitted to hospitalist with consult to gastroenterology at this time        ED RESULTS   Laboratory results:  Labs Reviewed   CBC WITH AUTO DIFFERENTIAL - Abnormal; Notable for the following components:       Result Value    RBC 3.22 (*)     Hemoglobin 9.8 (*)     Hematocrit 31.4 (*)     MCV 97.5 (*)     MCHC 31.2 (*)     RDW-CV 16.3 (*)     RDW-SD 57.0 (*)     All other components within normal limits   COMPREHENSIVE METABOLIC PANEL - Abnormal; Notable for the following components:    Glucose 122 (*)     CO2 19 (*)     AST 55 (*)     Alkaline Phosphatase 156 (*)     All other components within normal limits   PROTIME-INR - Abnormal; Notable for the following components:    INR 1.19 (*)     All other components within normal limits   MAGNESIUM   BLOOD OCCULT STOOL SCREEN #1   APTT   ANION GAP   GLOMERULAR FILTRATION RATE, ESTIMATED   OSMOLALITY   TYPE AND SCREEN       Radiologic studies results:  No orders to display       ED Medications administered this visit: Medications - No data to display      ED COURSE      Patient remained hemodynamically stable with blood pressure 119/73 and in no distress at this time. She has had no bowel movements while in the emergency department or vomiting. Patient will be admitted for further observation. MEDICATION CHANGES     New Prescriptions    No medications on file         FINAL DISPOSITION     Final diagnoses:   Gastrointestinal hemorrhage with melena   Hx of esophageal varices   History of ETOH abuse     Condition: condition: fair  Dispo: Admit to telemetry      This transcription was electronically signed.  Parts of this transcriptions may have been dictated by use of voice recognition software and electronically transcribed, and parts may have been transcribed with the assistance of an ED scribe. The transcription may contain errors not detected in proofreading.   Please refer to my supervising physician's documentation if my documentation differs.         -     Minerva Bobby MD  Resident  08/20/20 8501

## 2020-08-20 NOTE — ED NOTES
Pt updated on POC at this time. Pt verbalized understanding. Pt vitals remain stable. Denies any needs. Denies any pain. Call light in reach.  Will continue to monitor      Holli Woody RN  08/20/20 [id number]

## 2020-08-20 NOTE — ED PROVIDER NOTES
Vidhya Bailey MD, personally performed and participated in key or critical portions of the evaluation and management including personally performing the exam and medical decision making. I verify the accuracy of the documentation by the resident. GI banding done recently for upper GI bleed, returns with 2 episodes of large amount of dark stool. Pt's hemoglobin is decent. Pt nonetheless will be admitted to [facility], per GI Dr. Rossy Martinez. Labs Reviewed   CBC WITH AUTO DIFFERENTIAL - Abnormal; Notable for the following components:       Result Value    RBC 3.22 (*)     Hemoglobin 9.8 (*)     Hematocrit 31.4 (*)     MCV 97.5 (*)     MCHC 31.2 (*)     RDW-CV 16.3 (*)     RDW-SD 57.0 (*)     All other components within normal limits   COMPREHENSIVE METABOLIC PANEL - Abnormal; Notable for the following components:    Glucose 122 (*)     CO2 19 (*)     AST 55 (*)     Alkaline Phosphatase 156 (*)     All other components within normal limits   PROTIME-INR - Abnormal; Notable for the following components:    INR 1.19 (*)     All other components within normal limits   MAGNESIUM   BLOOD OCCULT STOOL SCREEN #1   APTT   ANION GAP   GLOMERULAR FILTRATION RATE, ESTIMATED   OSMOLALITY   TYPE AND SCREEN       No orders to display         I was present for the key or all critical portions of the procedure(s) performed by the resident physician.        Kenzie Hernandez MD  08/20/20 9245

## 2020-08-20 NOTE — CONSULTS
Consult History & Physical      Patient:  Nitesh Macias  YOB: 1955  MRN: [id number]     Acct: [de-identified]    Chief Complaint:    Chief Complaint   Patient presents with    Rectal Bleeding       Date of Service: Pt seen/examined in consultation on 8/20/2020    History Of Present Illness:      75 y.o. male who we are asked to see/evaluate by Nam Smith MD for medical management of melena. He came to the ED today for a large, formed, black stool he had today. He is on low dose aspirin daily. Hgb 9.8, up from 7.8 on 08/16/20. FOBT+ He was recently admitted 08/11/20-08/16/20 for hematemesis & melena. He had an EGD 08/11/20, by Dr. Lakshmi Lombardi, which demonstrated large esophageal varices which were banded and the fundus was full of blood and clots. He was discharged home on a PPI with a repeat EGD to be scheduled with banding in 3-4 weeks. VS stable. He denies abdominal pain, nausea, and vomiting. He states this is the second bowel movement since discharge. His last colonoscopy was in 2018. Past Medical History:    Past Medical History:   Diagnosis Date    Diabetes ([facility] [address]     GERD (gastroesophageal reflux disease)     Gout     Hyperlipidemia     Hypertension     Liver cirrhosis (HCC)     Seasonal allergies     Sleep apnea        Home Medications:  Prior to Admission medications    Medication Sig Start Date End Date Taking?  Authorizing Provider   pantoprazole (PROTONIX) 40 MG tablet Take 1 tablet by mouth daily 8/17/20  Yes Cathryn Woody PA-C   atorvastatin (LIPITOR) 10 MG tablet Take 10 mg by mouth nightly   Yes Historical Provider, MD   allopurinol (ZYLOPRIM) 300 MG tablet Take 300 mg by mouth daily   Yes Historical Provider, MD   cetirizine (ZYRTEC) 10 MG tablet Take 10 mg by mouth daily   Yes Historical Provider, MD   Multiple Vitamins-Minerals (MULTIVITAMIN ADULT PO) Take 1 tablet by mouth daily   Yes Historical Provider, MD   Dulaglutide (TRULICITY) 9.87 VC/1.9AP SOPN Inject 1.75 mg into the skin once a week    Historical Provider, MD   dapagliflozin (FARXIGA) 10 MG tablet Take 10 mg by mouth nightly    Historical Provider, MD   lisinopril (PRINIVIL;ZESTRIL) 10 MG tablet Take 10 mg by mouth daily    Historical Provider, MD   metFORMIN (GLUCOPHAGE) 1000 MG tablet Take 1,000 mg by mouth 2 times daily (with meals)    Historical Provider, MD   montelukast (SINGULAIR) 10 MG tablet Take 10 mg by mouth nightly    Historical Provider, MD   aspirin 81 MG tablet Take 81 mg by mouth daily    Historical Provider, MD   Misc Natural Products (OSTEO BI-FLEX ADV JOINT SHIELD PO) Take by mouth daily    Historical Provider, MD       Surgical History:  Past Surgical History:   Procedure Laterality Date    CERVICAL FUSION  2004    Dr Wanda Cornell  04/2015    Dr Paige Leon  11/2017    Dr aPige Leon 8/11/2020    EGD CONTROL HEMORRHAGE performed by Poli Carr MD at [facility]       Family History:  Family History   Problem Relation Age of Onset    Heart Disease Mother     Cancer Father         colon    Lung Cancer Father     Heart Disease Brother        Past GI History:  Liver cirrhosis, esophageal varices with banding, EGD, colonoscopy, colon polyps, family history of colon cancer  Dr. Parekh Gower patient    Allergies:  Sulfa antibiotics    Social History:   TOBACCO:   reports that he has quit smoking. He has quit using smokeless tobacco.  ETOH:   reports current alcohol use. Review Of Systems  GENERAL: No fever, chills or weight loss. EYES:  No  blurred vision, double vision   CARDIOVASCULAR: No chest pain or palpitations. RESPIRATORY:  No dyspnea or cough. GI:  See HPI  MUSCULOSKELETAL: No new painful or swollen joints or myalgias. :   No dysuria or hematuria. SKIN:  No rashes or jaundice. NEUROLOGIC:  No headaches or seizures, numbness or tingling of arms, or legs.     PSYCH: No anxiety or depression. ENDOCRINE:  No polyuria or polydipsia. BLOOD:  +anemia    PHYSICAL EXAM:  /81   Pulse 94   Temp 98.5 °F (36.9 °C) (Oral)   Resp 14   Ht 6' (1.829 m)   Wt 215 lb (97.5 kg)   SpO2 98%   BMI 29.16 kg/m²     General appearance: No apparent distress, appears stated age and cooperative. HEENT: Normal cephalic, atraumatic without obvious deformity. Pupils equal, round, and reactive to light. Neck: Supple, with full range of motion. No jugular venous distention. Trachea midline. Respiratory:  Normal respiratory effort. Clear to auscultation, bilaterally without Rales/Wheezes/Rhonchi. Cardiovascular: Regular rate and rhythm without murmurs, rubs or gallops. Abdomen: Soft, non-tender, non-distended with active bowel sounds. Musculoskeletal: No clubbing, cyanosis or edema bilaterally. Skin: Pink, warm, dry. No rashes or lesions. Psychiatric: Alert and oriented, thought content appropriate, normal insight    Labs:   Recent Labs     08/20/20  1145   WBC 5.3   HGB 9.8*   HCT 31.4*        Recent Labs     08/20/20  1145      K 3.9      CO2 19*   BUN 11   CREATININE 0.8   CALCIUM 8.9     Recent Labs     08/20/20  1145   AST 55*   ALT 42   BILITOT 0.8   ALKPHOS 156*     Recent Labs     08/20/20  1145   INR 1.19*       Radiology:   None    Code Status: Full Code    ASSESSMENT:  1. Melena- likely residual black stool from esophageal bleed last week, patient has only had 2 bowel movements since discharge  2. Chronic anemia  3. Liver cirrhosis- MELD 8  4. H/O esophageal varices s/p banding 08/11/20  5. DM  6. H/O hemochromatosis  7. HLD  8. HTN   9. H/O alcohol abuse, stopped drinking about a month ago  10. H/O colon polyps  11.  Family history of colon cancer    PLAN:     Monitor H & H, transfuse prn   PPI gtt   Octreotide gtt   Nursing to monitor stool output   Clear liquid diet, nothing red   NPO at midnight   Milk of magnesia today & tomorrow   No plan for emergent endoscopic intervention as VS stable, Hgb stable, this is likely residual stool from previous bleed. If Hgb trends down and/or gross GI bleeding noted will consider EGD.  Supportive care per primary team  Will follow       Case reviewed and impression/plan reviewed in collaboration with Dr. Brittney Rios  Electronically signed by Julene Litten, APRN - CNP on 8/20/2020 at 3:54 PM    [facility]  Thank you for the consultation.

## 2020-08-21 VITALS
OXYGEN SATURATION: 97 % | RESPIRATION RATE: 16 BRPM | HEART RATE: 92 BPM | WEIGHT: 208.1 LBS | SYSTOLIC BLOOD PRESSURE: 106 MMHG | HEIGHT: 72 IN | BODY MASS INDEX: 28.19 KG/M2 | TEMPERATURE: 97.6 F | DIASTOLIC BLOOD PRESSURE: 59 MMHG

## 2020-08-21 PROBLEM — K92.1 MELENA: Status: ACTIVE | Noted: 2020-08-21

## 2020-08-21 LAB
ALBUMIN SERPL-MCNC: 3.2 G/DL (ref 3.5–5.1)
ALP BLD-CCNC: 135 U/L (ref 38–126)
ALT SERPL-CCNC: 42 U/L (ref 11–66)
ANION GAP SERPL CALCULATED.3IONS-SCNC: 12 MEQ/L (ref 8–16)
AST SERPL-CCNC: 62 U/L (ref 5–40)
BILIRUB SERPL-MCNC: 0.9 MG/DL (ref 0.3–1.2)
BUN BLDV-MCNC: 9 MG/DL (ref 7–22)
CALCIUM SERPL-MCNC: 8.5 MG/DL (ref 8.5–10.5)
CHLORIDE BLD-SCNC: 105 MEQ/L (ref 98–111)
CO2: 19 MEQ/L (ref 23–33)
CREAT SERPL-MCNC: 0.9 MG/DL (ref 0.4–1.2)
ERYTHROCYTE [DISTWIDTH] IN BLOOD BY AUTOMATED COUNT: 16.2 % (ref 11.5–14.5)
ERYTHROCYTE [DISTWIDTH] IN BLOOD BY AUTOMATED COUNT: 56.7 FL (ref 35–45)
GFR SERPL CREATININE-BSD FRML MDRD: 84 ML/MIN/1.73M2
GLUCOSE BLD-MCNC: 100 MG/DL (ref 70–108)
GLUCOSE BLD-MCNC: 142 MG/DL (ref 70–108)
HCT VFR BLD CALC: 28 % (ref 42–52)
HCT VFR BLD CALC: 29.4 % (ref 42–52)
HEMOGLOBIN: 8.8 GM/DL (ref 14–18)
HEMOGLOBIN: 9.4 GM/DL (ref 14–18)
MCH RBC QN AUTO: 30.9 PG (ref 26–33)
MCHC RBC AUTO-ENTMCNC: 31.4 GM/DL (ref 32.2–35.5)
MCV RBC AUTO: 98.2 FL (ref 80–94)
PLATELET # BLD: 131 THOU/MM3 (ref 130–400)
PMV BLD AUTO: 11.8 FL (ref 9.4–12.4)
POTASSIUM REFLEX MAGNESIUM: 4.4 MEQ/L (ref 3.5–5.2)
RBC # BLD: 2.85 MILL/MM3 (ref 4.7–6.1)
SODIUM BLD-SCNC: 136 MEQ/L (ref 135–145)
TOTAL PROTEIN: 5.7 G/DL (ref 6.1–8)
WBC # BLD: 4.7 THOU/MM3 (ref 4.8–10.8)

## 2020-08-21 PROCEDURE — 36415 COLL VENOUS BLD VENIPUNCTURE: CPT

## 2020-08-21 PROCEDURE — 82948 REAGENT STRIP/BLOOD GLUCOSE: CPT

## 2020-08-21 PROCEDURE — G0378 HOSPITAL OBSERVATION PER HR: HCPCS

## 2020-08-21 PROCEDURE — 6360000002 HC RX W HCPCS: Performed by: NURSE PRACTITIONER

## 2020-08-21 PROCEDURE — 85027 COMPLETE CBC AUTOMATED: CPT

## 2020-08-21 PROCEDURE — 6360000002 HC RX W HCPCS: Performed by: FAMILY MEDICINE

## 2020-08-21 PROCEDURE — 80053 COMPREHEN METABOLIC PANEL: CPT

## 2020-08-21 PROCEDURE — 85014 HEMATOCRIT: CPT

## 2020-08-21 PROCEDURE — 6370000000 HC RX 637 (ALT 250 FOR IP): Performed by: FAMILY MEDICINE

## 2020-08-21 PROCEDURE — 2580000003 HC RX 258: Performed by: NURSE PRACTITIONER

## 2020-08-21 PROCEDURE — 6370000000 HC RX 637 (ALT 250 FOR IP): Performed by: NURSE PRACTITIONER

## 2020-08-21 PROCEDURE — 85018 HEMOGLOBIN: CPT

## 2020-08-21 PROCEDURE — 2580000003 HC RX 258: Performed by: FAMILY MEDICINE

## 2020-08-21 PROCEDURE — 96376 TX/PRO/DX INJ SAME DRUG ADON: CPT

## 2020-08-21 PROCEDURE — 99239 HOSP IP/OBS DSCHRG MGMT >30: CPT | Performed by: NURSE PRACTITIONER

## 2020-08-21 PROCEDURE — C9113 INJ PANTOPRAZOLE SODIUM, VIA: HCPCS | Performed by: FAMILY MEDICINE

## 2020-08-21 RX ORDER — PANTOPRAZOLE SODIUM 40 MG/1
40 TABLET, DELAYED RELEASE ORAL
Status: DISCONTINUED | OUTPATIENT
Start: 2020-08-21 | End: 2020-08-21

## 2020-08-21 RX ORDER — POLYETHYLENE GLYCOL 3350 17 G/17G
17 POWDER, FOR SOLUTION ORAL DAILY PRN
Qty: 527 G | Refills: 1 | Status: SHIPPED | OUTPATIENT
Start: 2020-08-21 | End: 2020-09-20

## 2020-08-21 RX ORDER — PANTOPRAZOLE SODIUM 40 MG/1
40 TABLET, DELAYED RELEASE ORAL
Status: DISCONTINUED | OUTPATIENT
Start: 2020-08-22 | End: 2020-08-21 | Stop reason: HOSPADM

## 2020-08-21 RX ADMIN — SODIUM CHLORIDE 8 MG/HR: 9 INJECTION, SOLUTION INTRAVENOUS at 06:08

## 2020-08-21 RX ADMIN — INSULIN LISPRO 1 UNITS: 100 INJECTION, SOLUTION INTRAVENOUS; SUBCUTANEOUS at 11:22

## 2020-08-21 RX ADMIN — ALLOPURINOL 300 MG: 300 TABLET ORAL at 09:18

## 2020-08-21 RX ADMIN — LISINOPRIL 10 MG: 10 TABLET ORAL at 09:18

## 2020-08-21 RX ADMIN — OCTREOTIDE ACETATE 25 MCG/HR: 500 INJECTION, SOLUTION INTRAVENOUS; SUBCUTANEOUS at 09:20

## 2020-08-21 RX ADMIN — SODIUM CHLORIDE 8 MG/HR: 9 INJECTION, SOLUTION INTRAVENOUS at 09:20

## 2020-08-21 RX ADMIN — MAGNESIUM HYDROXIDE 30 ML: 2400 SUSPENSION ORAL at 09:18

## 2020-08-21 ASSESSMENT — PAIN SCALES - GENERAL: PAINLEVEL_OUTOF10: 0

## 2020-08-21 NOTE — PROGRESS NOTES
Gastroenterology Progress Note:     Patient Name:  Dinesh Sifuentes   MRN: [id number]  [id number]  YOB: 1955  Admit Date: 8/20/2020 11:21 AM  Primary Care Physician: Scarlett Aguilar   6K-06/006-A     Patient seen and examined. 24 hours events and chart reviewed. Subjective: Patient resting in bed, wife present. Denies abdominal pain, nausea, vomiting, SOB. No bowel movement overnight or today. Hgb 8.8    Objective:  BP (!) 104/56   Pulse 86   Temp 98.4 °F (36.9 °C) (Oral)   Resp 16   Ht 6' (1.829 m)   Wt 208 lb 1.6 oz (94.4 kg)   SpO2 95%   BMI 28.22 kg/m²     Physical Exam:    General:  Nourished in no distress  HEENT: Atraumatic, normocephalic. Moist oral mucous membranes. Neck: Supple without adenopathy, JVD, thyromegaly or masses. Trachea midline. CV: Heart RRR, no murmurs, rubs, gallops. Resp: Even, easy without cough or accessory use. Lungs clear to ascultation bilaterally. Abd: Round, soft, nontender. No hepatosplenomegaly or mass present. Active bowel sounds heard. No distention noted. Ext:  Without cyanosis, clubbing, edema. Skin: Pink, warm, dry  Neuro:  Alert, oriented x 3 with no obvious deficits.        Rectal: deferred    Labs:   CBC:   Lab Results   Component Value Date    WBC 4.7 08/21/2020    HGB 8.8 08/21/2020    HCT 28.0 08/21/2020    MCV 98.2 08/21/2020     08/21/2020     BMP:   Lab Results   Component Value Date     08/21/2020    K 4.4 08/21/2020     08/21/2020    CO2 19 08/21/2020    PHOS 2.0 08/12/2020    BUN 9 08/21/2020    CREATININE 0.9 08/21/2020    CALCIUM 8.5 08/21/2020     PT/INR:   Lab Results   Component Value Date    INR 1.19 08/20/2020     Lipids:   Lab Results   Component Value Date    ALKPHOS 135 08/21/2020    ALT 42 08/21/2020    AST 62 08/21/2020    BILITOT 0.9 08/21/2020    BILIDIR 0.3 08/15/2020    LABALBU 3.2 08/21/2020     Current Meds:  Scheduled Meds:   sodium chloride flush  10 mL Intravenous 2 times per day    cefTRIAXone (ROCEPHIN) IV  1 g Intravenous Q24H    allopurinol  300 mg Oral Daily    atorvastatin  10 mg Oral Nightly    dapagliflozin  10 mg Oral Nightly    lisinopril  10 mg Oral Daily    montelukast  10 mg Oral Nightly    insulin lispro  0-6 Units Subcutaneous TID     insulin lispro  0-3 Units Subcutaneous Nightly     Continuous Infusions:   pantoprozole (PROTONIX) infusion 8 mg/hr (08/21/20 0920)    octreotide (SANDOSTATIN) infusion 25 mcg/hr (08/21/20 0920)    dextrose         Assessment:  76 yo M admitted 08/20/20 for melena & anemia. FOBT+ Hgb 9.8, up from 7.8 on 08/16/20. EGD 08/11/20, by Dr. Lakshmi Lombardi, which demonstrated large esophageal varices which were banded and the fundus was full of blood and clots. He was discharged home on a PPI with a repeat EGD to be scheduled with banding in 3-4 weeks. No abd pain/n/v.  1. Melena- likely residual black stool from esophageal bleed last week, patient has only had 2 bowel movements since discharge  2. Chronic anemia  3. Liver cirrhosis- MELD 8  4. H/O esophageal varices s/p banding 08/11/20  5. DM  6. H/O hemochromatosis  7. HLD  8. HTN   9. H/O alcohol abuse, stopped drinking about a month ago  10. H/O colon polyps  11.  Family history of colon cancer    Plan:    · Monitor H & H, transfuse prn  · PO PPI   · Stop Octreotide gtt  · Nursing to monitor stool output  · General diet  · If repeat H & H is stable, okay to discharge from GI standpoint when cleared by primary team  · Discussed signs & symptoms of UGI bleed with patient & spouse, both verbalized understanding  · Supportive care per primary team  · EGD 09/08/20 at 1130 with Dr. Steven Reddy, will need COVID test 7-10 days prior    Case reviewed and impression/plan reviewed in collaboration with Dr. Virgilio Goldberg  Electronically signed by NAOMIE Young CNP on 8/21/2020 at 9:36 AM    [facility]

## 2020-08-21 NOTE — CARE COORDINATION
8/21/20, 7:19 AM EDT  DISCHARGE PLANNING EVALUATION:    Ernesto Aviles       Admitted from: ER 8/20/2020/ [facility] day: 1   Location: [facility]06/006 Reason for admit: Rectal bleed [K62.5] Status: IP   Admit order signed?: yes  PMH:  has a past medical history of Diabetes ([facility] [address] Esophageal varices ([facility] [address] GERD (gastroesophageal reflux disease), Gout, Hyperlipidemia, Hypertension, Liver cirrhosis ([facility] [address] Seasonal allergies, and Sleep apnea. Medications:  Scheduled Meds:   sodium chloride flush  10 mL Intravenous 2 times per day    magnesium hydroxide  30 mL Oral Daily    cefTRIAXone (ROCEPHIN) IV  1 g Intravenous Q24H    allopurinol  300 mg Oral Daily    atorvastatin  10 mg Oral Nightly    dapagliflozin  10 mg Oral Nightly    lisinopril  10 mg Oral Daily    montelukast  10 mg Oral Nightly    insulin lispro  0-6 Units Subcutaneous TID WC    insulin lispro  0-3 Units Subcutaneous Nightly     Continuous Infusions:   pantoprozole (PROTONIX) infusion 8 mg/hr (08/21/20 0608)    octreotide (SANDOSTATIN) infusion 25 mcg/hr (08/20/20 2137)    dextrose        Pertinent Info/Orders/Treatment Plan: Hgb 8.8, occult stool+. H/H q6. Protonix gtt, octreotide gtt, IV rocephin, DM management. . GI following, no plans for emergent endoscopy. Diet: Diet NPO, After Midnight   Smoking status:  reports that he has quit smoking.  He has quit using smokeless tobacco.   PCP: Rupa Smith  Readmission 30 days or less: no  Readmission Risk Score: 16%    Discharge Planning Evaluation  Current Residence:  Private Residence  Living Arrangements:  Spouse/Significant Other   Support Systems:  Spouse/Significant Other, Family Members  Current Services PTA:     Potential Assistance Needed:  N/A  Potential Assistance Purchasing Medications:  No  Does patient want to participate in local refill/ meds to beds program?  No  Type of Home Care Services:  None  Patient expects to be discharged to:  home w/ wife  Expected Discharge date: 08/23/20  Follow Up Appointment: Tyrone Weir Day/ Time: Tuesday AM    Patient Goals/Plan/Treatment Preferences: Spoke with Maritza Oh, he plans return home with his wife at discharge. He does not use DME or [facility] at discharge. Transportation/Food Security/Housekeeping Addressed:  No issues identified.  Evaluation: no     8/21/20, 12:29 PM EDT    Patient goals/plan/ treatment preferences discussed by  and . Patient goals/plan/ treatment preferences reviewed with patient/ family. Patient/ family verbalize understanding of discharge plan and are in agreement with goal/plan/treatment preferences. Understanding was demonstrated using the teach back method. AVS provided by RN at time of discharge, which includes all necessary medical information pertaining to the patients current course of illness, treatment, post-discharge goals of care, and treatment preferences.         IMM Letter  IMM Letter given to Patient/Family/Significant other/Guardian/POA/by[de-identified] staff  IMM Letter date given[de-identified] 08/20/20  IMM Letter time given[de-identified] 1013

## 2020-08-21 NOTE — CARE COORDINATION
08/21/20 1041   Readmission Assessment   Number of Days since last admission? 1-7 days   Previous Disposition Home with Family   Who is being Interviewed Patient   What was the patient's/caregiver's perception as to why they think they needed to return back to the hospital? Other (Comment)  (returned to hospital due to feeling poorly)   Did you visit your Primary Care Physician after you left the hospital, before you returned this time? No   Why weren't you able to visit your PCP? Other (Comment)  (has an appt on Tuesday 8/25)   Did you see a specialist, such as Cardiac, Pulmonary, Orthopedic Physician, etc. after you left the hospital? No   Who advised the patient to return to the hospital? Self-referral   In our efforts to provide the best possible care to you and others like you, can you think of anything that we could have done to help you after you left the hospital the first time, so that you might not have needed to return so soon?  Other (Comment)  (nothing)

## 2020-08-21 NOTE — PROGRESS NOTES
Hospitalist Progress Note    Patient:  Alfie Bingham      Unit/Bed:6K-06/006-A    YOB: 1955    MRN: [id number]       Acct: [de-identified]     PCP: Shane Oro    Date of Admission: 8/20/2020    Assessment/Plan:    Acute blood loss anemia likely secondary to upper GI hemorrhage  Patient has a history of hemochromatosis, cirrhosis and chronic alcoholism, quit about a month ago  He was recently discharged from the hospital on 8/16, also admitted for acute rectal bleeding likely secondary to large esophageal varices status post banding on 8/11, 3 bands placed. S  Held aspirin  start the patient on pantoprazole and octreotide infusion - this have been discontinued by GI. PPI PO restarted. Trended H&H every 6 hours  - has been stable. Rocephin given for SBP prophylaxis  GI seen. [id number] Ronit Colon with discharge today. Plan for OP EGD 9/8/2020.     Upper GI hemorrhage  See management above     Hepatic cirrhosis  Findings noted on ultrasound report on 8/12, consistent with cirrhosis of the liver. Patent portal vein on Doppler. .     DM type 2, controlled  Recent hemoglobin A1c is 8.5%  Continue Trulicity and Farxiga, may use patient's own supply  Held metformin ok to restart on discharge. Start low-dose Humalog sliding scale     Hereditary hemochromatosis  Continue to monitor  Follow-up as outpatient  Patient has not required any phlebotomy in the past few years     Hyperlipidemia  Patient takes atorvastatin     Essential hypertension  Continue lisinopril     History of chronic alcoholism  Patient usually drinks 4-5 bottles of beer per day since he was 23, recently quit about a month ago     DVT prophylaxis  SCDs    Dispo:  Home today. HH in 1 week. Scope 9/8/2020 OP. Condition stable. Discharge time 33 min.      Chief Complaint: Rectal bleed    Hospital Course:     75 y.o. male who presented to [facility] with a history of chronic alcoholism, hemochromatosis, hepatic cirrhosis, esophageal varices status post banding on 8/11 (recently discharged on 8/16), presented to ED for a recurrence in rectal bleed today, described as \"black sticky stool\", which initially presented with a dark colored loose stool 3 days prior to admission, accompanied by mild nausea, but no vomiting, abdominal pain, constipation or diarrhea. He was instructed by Dr. Pamela Diaz office to go to ED for further work-up and evaluation.      Upon presentation in the ED, patient's hemoglobin was 9.8, WBC 5.3 with platelets of 367. INR 1.19. Occult blood stool positive. ALT 42, AST 55, alkaline phosphatase 156. At this time, the patient is hemodynamically stable.     Patient admitted to a telemetry floor and started on Protonix drip. Serial hemoglobin and hematocrit monitoring ordered. GI consulted. Subjective (past 24 hours): No pain. Denies hematochezia, No BRBPR. Afebrile.  No n/v.        Medications:  Reviewed    Infusion Medications    dextrose       Scheduled Medications    [START ON 8/22/2020] pantoprazole  40 mg Oral QAM AC    sodium chloride flush  10 mL Intravenous 2 times per day    cefTRIAXone (ROCEPHIN) IV  1 g Intravenous Q24H    allopurinol  300 mg Oral Daily    atorvastatin  10 mg Oral Nightly    dapagliflozin  10 mg Oral Nightly    lisinopril  10 mg Oral Daily    montelukast  10 mg Oral Nightly    insulin lispro  0-6 Units Subcutaneous TID WC    insulin lispro  0-3 Units Subcutaneous Nightly     PRN Meds: sodium chloride flush, acetaminophen **OR** acetaminophen, polyethylene glycol, promethazine **OR** ondansetron, potassium chloride **OR** potassium alternative oral replacement **OR** potassium chloride, glucose, dextrose, glucagon (rDNA), dextrose      Intake/Output Summary (Last 24 hours) at 8/21/2020 1325  Last data filed at 8/21/2020 1554  Gross per 24 hour   Intake 582 ml   Output 0 ml   Net 582 ml       Diet:  DIET GENERAL;    Exam:  BP (!) 106/59   Pulse 92   Temp 97.6 °F (36.4 °C) (Oral)   Resp 16   Ht 6' (1.829 m)   Wt 208 lb 1.6 oz (94.4 kg)   SpO2 97%   BMI 28.22 kg/m²     General appearance: No apparent distress, appears stated age and cooperative. HEENT: Pupils equal, round, and reactive to light. Conjunctivae/corneas clear. Neck: Supple, with full range of motion. No jugular venous distention. Trachea midline. Respiratory:  Normal respiratory effort. Clear to auscultation, bilaterally without Rales/Wheezes/Rhonchi. Cardiovascular: Regular rate and rhythm with normal S1/S2 without murmurs, rubs or gallops. Abdomen: Soft, non-tender, non-distended with normal bowel sounds. Musculoskeletal: passive and active ROM x 4 extremities. Skin: Skin color, texture, turgor normal.    Neurologic:  Neurovascularly intact without any focal sensory/motor deficits. Cranial nerves: II-XII intact, grossly non-focal.  Psychiatric: Alert and oriented, thought content appropriate  Capillary Refill: Brisk,< 3 seconds   Peripheral Pulses: +2 palpable, equal bilaterally       Labs:   Recent Labs     08/20/20  1145 08/20/20  2215 08/21/20  0333 08/21/20  0956   WBC 5.3  --  4.7*  --    HGB 9.8* 9.0* 8.8* 9.4*   HCT 31.4* 28.9* 28.0* 29.4*     --  131  --      Recent Labs     08/20/20  1145 08/21/20  0333    136   K 3.9 4.4    105   CO2 19* 19*   BUN 11 9   CREATININE 0.8 0.9   CALCIUM 8.9 8.5     Recent Labs     08/20/20  1145 08/21/20  0333   AST 55* 62*   ALT 42 42   BILITOT 0.8 0.9   ALKPHOS 156* 135*     Recent Labs     08/20/20  1145   INR 1.19*     No results for input(s): Joel Smoker in the last 72 hours. No results for input(s): PROCAL in the last 72 hours.     Microbiology:      Urinalysis:    No results found for: Rossi Bucco, BACTERIA, RBCUA, BLOODU, [address], [address]    Radiology:  No orders to display          Code Status: Full Code        Active Hospital Problems    Diagnosis Date Noted    Melena [K92.1] 08/21/2020    Rectal bleed [K62.5] 08/20/2020 Electronically signed by NAOMIE Tapia CNP on 8/21/2020 at 1:25 PM

## 2020-08-21 NOTE — CARE COORDINATION
Received message from [employer] in [address] that patient was backed out to OBS status. Printed OBS notice for patient but he is no longer in the building, was discharged home with wife. Allisno Ramirez notified.  Electronically signed by Darlene Medrano RN on 8/21/2020 at 1:47 PM

## 2020-08-24 ENCOUNTER — HOSPITAL ENCOUNTER (OUTPATIENT)
Age: 65
Discharge: HOME | End: 2020-08-24
Payer: MEDICARE

## 2020-08-24 LAB
BASOPHILS # BLD: 1 %
BASOPHILS ABSOLUTE: 0 THOU/MM3 (ref 0–0.1)
EOSINOPHIL # BLD: 2.5 %
EOSINOPHILS ABSOLUTE: 0.1 THOU/MM3 (ref 0–0.4)
ERYTHROCYTE [DISTWIDTH] IN BLOOD BY AUTOMATED COUNT: 15.9 % (ref 11.5–14.5)
ERYTHROCYTE [DISTWIDTH] IN BLOOD BY AUTOMATED COUNT: 55.8 FL (ref 35–45)
HCT VFR BLD CALC: 30.1 % (ref 42–52)
HEMOGLOBIN: 9.4 GM/DL (ref 14–18)
IMMATURE GRANS (ABS): 0.02 THOU/MM3 (ref 0–0.07)
IMMATURE GRANULOCYTES: 0.5 %
LYMPHOCYTES # BLD: 26.8 %
LYMPHOCYTES ABSOLUTE: 1.1 THOU/MM3 (ref 1–4.8)
MCH RBC QN AUTO: 30.6 PG (ref 26–33)
MCHC RBC AUTO-ENTMCNC: 31.2 GM/DL (ref 32.2–35.5)
MCV RBC AUTO: 98 FL (ref 80–94)
MONOCYTES # BLD: 9.8 %
MONOCYTES ABSOLUTE: 0.4 THOU/MM3 (ref 0.4–1.3)
NUCLEATED RED BLOOD CELLS: 0 /100 WBC
PLATELET # BLD: 171 THOU/MM3 (ref 130–400)
PMV BLD AUTO: 12.2 FL (ref 9.4–12.4)
RBC # BLD: 3.07 MILL/MM3 (ref 4.7–6.1)
SEG NEUTROPHILS: 59.4 %
SEGMENTED NEUTROPHILS ABSOLUTE COUNT: 2.4 THOU/MM3 (ref 1.8–7.7)
WBC # BLD: 4 THOU/MM3 (ref 4.8–10.8)

## 2020-08-24 PROCEDURE — 85025 COMPLETE CBC W/AUTO DIFF WBC: CPT

## 2020-08-24 PROCEDURE — 36415 COLL VENOUS BLD VENIPUNCTURE: CPT

## 2020-08-31 ENCOUNTER — HOSPITAL ENCOUNTER (OUTPATIENT)
Age: 65
Setting detail: SPECIMEN
Discharge: HOME | End: 2020-08-31
Payer: MEDICARE

## 2020-08-31 PROCEDURE — U0003 INFECTIOUS AGENT DETECTION BY NUCLEIC ACID (DNA OR RNA); SEVERE ACUTE RESPIRATORY SYNDROME CORONAVIRUS 2 (SARS-COV-2) (CORONAVIRUS DISEASE [COVID-19]), AMPLIFIED PROBE TECHNIQUE, MAKING USE OF HIGH THROUGHPUT TECHNOLOGIES AS DESCRIBED BY CMS-2020-01-R: HCPCS

## 2020-09-01 LAB — SARS-COV-2: NOT DETECTED

## 2020-09-08 ENCOUNTER — ANESTHESIA EVENT (OUTPATIENT)
Dept: ENDOSCOPY | Age: 65
End: 2020-09-08
Payer: MEDICARE

## 2020-09-08 ENCOUNTER — ANESTHESIA (OUTPATIENT)
Dept: ENDOSCOPY | Age: 65
End: 2020-09-08
Payer: MEDICARE

## 2020-09-08 ENCOUNTER — HOSPITAL ENCOUNTER (OUTPATIENT)
Age: 65
Setting detail: OUTPATIENT SURGERY
Discharge: HOME | End: 2020-09-08
Attending: INTERNAL MEDICINE | Admitting: INTERNAL MEDICINE
Payer: MEDICARE

## 2020-09-08 VITALS
WEIGHT: 214.4 LBS | HEART RATE: 90 BPM | OXYGEN SATURATION: 98 % | SYSTOLIC BLOOD PRESSURE: 121 MMHG | HEIGHT: 72 IN | RESPIRATION RATE: 20 BRPM | TEMPERATURE: 97.1 F | DIASTOLIC BLOOD PRESSURE: 64 MMHG | BODY MASS INDEX: 29.04 KG/M2

## 2020-09-08 VITALS
SYSTOLIC BLOOD PRESSURE: 108 MMHG | RESPIRATION RATE: 21 BRPM | OXYGEN SATURATION: 99 % | DIASTOLIC BLOOD PRESSURE: 59 MMHG

## 2020-09-08 PROCEDURE — 2580000003 HC RX 258: Performed by: INTERNAL MEDICINE

## 2020-09-08 PROCEDURE — 7100000001 HC PACU RECOVERY - ADDTL 15 MIN: Performed by: INTERNAL MEDICINE

## 2020-09-08 PROCEDURE — 7100000010 HC PHASE II RECOVERY - FIRST 15 MIN: Performed by: INTERNAL MEDICINE

## 2020-09-08 PROCEDURE — 2500000003 HC RX 250 WO HCPCS: Performed by: REGISTERED NURSE

## 2020-09-08 PROCEDURE — 3609012300 HC EGD BAND LIGATION ESOPHGEAL/GASTRIC VARICES: Performed by: INTERNAL MEDICINE

## 2020-09-08 PROCEDURE — 3700000001 HC ADD 15 MINUTES (ANESTHESIA): Performed by: INTERNAL MEDICINE

## 2020-09-08 PROCEDURE — 7100000000 HC PACU RECOVERY - FIRST 15 MIN: Performed by: INTERNAL MEDICINE

## 2020-09-08 PROCEDURE — 6360000002 HC RX W HCPCS: Performed by: REGISTERED NURSE

## 2020-09-08 PROCEDURE — 2709999900 HC NON-CHARGEABLE SUPPLY: Performed by: INTERNAL MEDICINE

## 2020-09-08 PROCEDURE — 3700000000 HC ANESTHESIA ATTENDED CARE: Performed by: INTERNAL MEDICINE

## 2020-09-08 PROCEDURE — 2720000010 HC SURG SUPPLY STERILE: Performed by: INTERNAL MEDICINE

## 2020-09-08 PROCEDURE — 7100000011 HC PHASE II RECOVERY - ADDTL 15 MIN: Performed by: INTERNAL MEDICINE

## 2020-09-08 RX ORDER — SODIUM CHLORIDE 450 MG/100ML
INJECTION, SOLUTION INTRAVENOUS CONTINUOUS
Status: DISCONTINUED | OUTPATIENT
Start: 2020-09-08 | End: 2020-09-08 | Stop reason: HOSPADM

## 2020-09-08 RX ORDER — PROPOFOL 10 MG/ML
INJECTION, EMULSION INTRAVENOUS PRN
Status: DISCONTINUED | OUTPATIENT
Start: 2020-09-08 | End: 2020-09-08 | Stop reason: SDUPTHER

## 2020-09-08 RX ORDER — LIDOCAINE HYDROCHLORIDE 20 MG/ML
INJECTION, SOLUTION EPIDURAL; INFILTRATION; INTRACAUDAL; PERINEURAL PRN
Status: DISCONTINUED | OUTPATIENT
Start: 2020-09-08 | End: 2020-09-08 | Stop reason: SDUPTHER

## 2020-09-08 RX ADMIN — SODIUM CHLORIDE: 4.5 INJECTION, SOLUTION INTRAVENOUS at 10:47

## 2020-09-08 RX ADMIN — PROPOFOL 200 MG: 10 INJECTION, EMULSION INTRAVENOUS at 11:55

## 2020-09-08 RX ADMIN — LIDOCAINE HYDROCHLORIDE 100 MG: 20 INJECTION, SOLUTION EPIDURAL; INFILTRATION; INTRACAUDAL; PERINEURAL at 11:55

## 2020-09-08 ASSESSMENT — PAIN SCALES - GENERAL
PAINLEVEL_OUTOF10: 4
PAINLEVEL_OUTOF10: 4

## 2020-09-08 ASSESSMENT — PAIN - FUNCTIONAL ASSESSMENT: PAIN_FUNCTIONAL_ASSESSMENT: 0-10

## 2020-09-08 NOTE — POST SEDATION
[address]  Sedation/Analgesia Post Sedation Record    Patient: Kang Doran : 1955  Med Rec#: [id number] Acc#: [id number]   Procedure Performed By: Orysia Phalen  Primary Care Physician: Mis Torres    POST-PROCEDURE    Physicians/Assistants: Orysia Phalen  Procedure Performed:    Sedation/Anesthesia:     Estimated Blood Loss:          ml  Specimens Removed:  [x]None []Other:      Disposition of Specimen:  []Pathology [x]Other      Complications:   [x]None Immediate []Other:     Post-procedure Diagnosis/Findings:             Recommendations:           varices  Orysia Phalen, MD [facility]  Electronically signed 2020 at 12:12 PM

## 2020-09-08 NOTE — OP NOTE
[address] [id number]                                OPERATIVE REPORT    PATIENT NAME: Shanell Shin                     :        1955  MED REC NO:   [id number]                           ROOM:  ACCOUNT NO:   [de-identified]                           ADMIT DATE: 2020  PROVIDER:     Edwin Davis M.D.    [place]:  2020    PROCEDURE:  EGD plus banding. SURGEON:  Edwin Davis MD    INDICATION FOR THE PROCEDURE:  The patient is with a history of  esophageal varices and GI bleed, found to have had bleeding varix. See  the preop note for rest of clinicals. ASA CLASSIFICATION:  III. MEDICATIONS:  Per Anesthesia. BIOPSIES:  None. PHOTOGRAPHS:  Yes. DESCRIPTION OF THE PROCEDURE:  Informed consent was obtained after  explaining risks and benefits of the procedure and conscious sedation. Possible complications including bleeding, perforation, reaction to  medicine, but not limiting to death were discussed. GIF-180 gastroscope was advanced through the oropharynx, esophagus, and  stomach into the duodenum. Normal-looking duodenal bulb and second  portion. Scope was withdrawn. Normal antrum. Retroflex exam showed  portal hypertensive gastropathy. No gastric varices. Scope was  withdrawn. Three columns of esophageal varices were seen and I felt  that they need banding. Scope was withdrawn. Six band sutures were  attached to it and at 39 cm, three bands were put in on, one-one on each  varix. Afterwards, there was no active bleeding. The patient tolerated  the procedure well. IMPRESSION:  1.  Multiple esophageal varices, post banding. 2.  Previous GI bleeding from esophageal varix. RECOMMENDATIONS:  The patient would require another session in four to  six weeks.         Demetrice Alexander M.D.    D: 2020 12:21:56       T: 2020 12:48:22     [email or web address]  Job#: [id number]     Doc#:

## 2020-09-08 NOTE — ANESTHESIA PRE PROCEDURE
Department of Anesthesiology  Preprocedure Note       Name:  Homar Thapa   Age:  75 y.o.  :  1955                                          MRN:  [id number]         Date:  2020      Surgeon: Marilu Chow):  Angela Pabon MD    Procedure: Procedure(s):  EGD BANDING    Medications prior to admission:   Prior to Admission medications    Medication Sig Start Date End Date Taking? Authorizing Provider   polyethylene glycol (GLYCOLAX) 17 g packet Take 17 g by mouth daily as needed for Constipation 20 Yes Valentino Alvaro, APRN - CNP   pantoprazole (PROTONIX) 40 MG tablet Take 1 tablet by mouth daily 20  Yes Eva Buck PA-C   Dulaglutide (TRULICITY) 7.13 CV/8.4SK SOPN Inject 1.75 mg into the skin once a week   Yes Historical Provider, MD   dapagliflozin (FARXIGA) 10 MG tablet Take 10 mg by mouth nightly   Yes Historical Provider, MD   atorvastatin (LIPITOR) 10 MG tablet Take 10 mg by mouth nightly   Yes Historical Provider, MD   lisinopril (PRINIVIL;ZESTRIL) 10 MG tablet Take 10 mg by mouth daily   Yes Historical Provider, MD   metFORMIN (GLUCOPHAGE) 1000 MG tablet Take 1,000 mg by mouth 2 times daily (with meals)   Yes Historical Provider, MD   allopurinol (ZYLOPRIM) 300 MG tablet Take 300 mg by mouth daily   Yes Historical Provider, MD   montelukast (SINGULAIR) 10 MG tablet Take 10 mg by mouth nightly   Yes Historical Provider, MD   cetirizine (ZYRTEC) 10 MG tablet Take 10 mg by mouth daily   Yes Historical Provider, MD   Misc Natural Products (OSTEO BI-FLEX ADV JOINT SHIELD PO) Take by mouth daily   Yes Historical Provider, MD   Multiple Vitamins-Minerals (MULTIVITAMIN ADULT PO) Take 1 tablet by mouth daily   Yes Historical Provider, MD       Current medications:    Current Facility-Administered Medications   Medication Dose Route Frequency Provider Last Rate Last Dose    0.45 % sodium chloride infusion   Intravenous Continuous Angela Pabon MD           Allergies:     Allergies Allergen Reactions    Sulfa Antibiotics      Unsure of reaction mother told him since he was young        Problem List:    Patient Active Problem List   Diagnosis Code    Hereditary hemochromatosis ([facility] E83.110    Calculus of gallbladder with chronic cholecystitis without obstruction K80.10    Abnormal biliary HIDA scan R94.8    GI hemorrhage K92.2    Rectal bleed K62.5    Melena K92.1       Past Medical History:        Diagnosis Date    [facility]     Esophageal varices ([facility]     GERD (gastroesophageal reflux disease)     Gout     Hyperlipidemia     Hypertension     Liver cirrhosis ([facility]     Seasonal allergies     Sleep apnea        Past Surgical History:        Procedure Laterality Date    BACK SURGERY      CERVICAL FUSION  2004    Dr Anderson Pae [facility]    Dr Rubio [address] 8/11/2020    EGD CONTROL HEMORRHAGE performed by Cali Farias MD at [facility]       Social History:    Social History     Tobacco Use    Smoking status: Former Smoker    Smokeless tobacco: Former User   Substance Use Topics    Alcohol use: Not Currently     Comment: daily-2 beers                                Counseling given: Not Answered      Vital Signs (Current):   Vitals:    09/08/20 1032   BP: 126/76   Pulse: 101   Resp: 16   SpO2: 96%   Weight: 214 lb 6.4 oz (97.3 kg)   Height: 6' (1.829 m)                                              BP Readings from Last 3 Encounters:   09/08/20 126/76   08/21/20 (!) 106/59   08/16/20 125/66       NPO Status:                                                                                 BMI:   Wt Readings from Last 3 Encounters:   09/08/20 214 lb 6.4 oz (97.3 kg)   08/21/20 208 lb 1.6 oz (94.4 kg)   08/13/20 230 lb 9.6 oz (104.6 kg)     Body mass index is 29.08 kg/m².     CBC:   Lab Results Component Value Date    WBC 4.0 08/24/2020    RBC 3.07 08/24/2020    HGB 9.4 08/24/2020    HCT 30.1 08/24/2020    MCV 98.0 08/24/2020    RDW 14.6 08/11/2020     08/24/2020       CMP:   Lab Results   Component Value Date     08/21/2020    K 4.4 08/21/2020     08/21/2020    CO2 19 08/21/2020    BUN 9 08/21/2020    CREATININE 0.9 08/21/2020    LABGLOM 84 08/21/2020    GLUCOSE 100 08/21/2020    PROT 5.7 08/21/2020    CALCIUM 8.5 08/21/2020    BILITOT 0.9 08/21/2020    ALKPHOS 135 08/21/2020    AST 62 08/21/2020    ALT 42 08/21/2020       POC Tests: No results for input(s): POCGLU, POCNA, POCK, POCCL, POCBUN, POCHEMO, POCHCT in the last 72 hours. Coags:   Lab Results   Component Value Date    INR 1.19 08/20/2020    APTT 29.2 08/20/2020       HCG (If Applicable): No results found for: PREGTESTUR, PREGSERUM, HCG, HCGQUANT     ABGs: No results found for: PHART, PO2ART, DFI9USH, YIM0DLR, BEART, J8ELQTTD     Type & Screen (If Applicable):  Lab Results   Component Value Date    LABRH POS 08/20/2020       Drug/Infectious Status (If Applicable):  No results found for: HIV, HEPCAB    COVID-19 Screening (If Applicable):   Lab Results   Component Value Date    COVID19 Not Detected 08/31/2020         Anesthesia Evaluation  Patient summary reviewed no history of anesthetic complications:   Airway: Mallampati: III        Dental: normal exam         Pulmonary:normal exam  breath sounds clear to auscultation  (+) sleep apnea: on CPAP,                             Cardiovascular:    (+) hypertension:,       ECG reviewed                        Neuro/Psych:   Negative Neuro/Psych ROS              GI/Hepatic/Renal:   (+) GERD:, liver disease:,          ROS comment: Cirrhosis, esophogeal varices. Endo/Other:    (+) DiabetesType II DM, , .                  ROS comment: Gout, Hereditary hemochromatosis  Abdominal:   (+) obese,     Abdomen: soft. Vascular: negative vascular ROS. Anesthesia Plan      MAC     ASA 3       Induction: intravenous. Anesthetic plan and risks discussed with patient and spouse. Plan discussed with attending.                   NAOMIE Poon - CRNA   9/8/2020

## 2020-09-08 NOTE — OP NOTE
[address] [place] [address]                                OPERATIVE REPORT    PATIENT NAME: Singh Mckay                     :        1955  MED REC NO:   [id number]                           ROOM:  ACCOUNT NO:   [de-identified]                           ADMIT DATE: 2020  PROVIDER:     NOLAN Lawrence OF PROCEDURE:  2020    ADDENDUM    BLOOD LOSS:  None.         Lady Gonzalez M.D.    D: 2020 12:22:53       T: 2020 12:45:13     [email or web address]  Job#: [id number]     Doc#: [id number]    CC:

## 2020-09-08 NOTE — ANESTHESIA POSTPROCEDURE EVALUATION
Department of Anesthesiology  Postprocedure Note    Patient: Alok Byers  MRN: [id number]  YOB: 1955  Date of evaluation: 9/8/2020  Time:  12:08 PM     Procedure Summary     Date:  09/08/20 Room / Location:  [address] [place] [address] [address]    Anesthesia Start:  9716 Anesthesia Stop:  5589    Procedure:  EGD BANDING (Left Esophagus) Diagnosis:  (VARICES)    Surgeon:  Macho Lucio MD Responsible Provider:  Dion Ramírez MD    Anesthesia Type:  MAC ASA Status:  3          Anesthesia Type: MAC    Uche Phase I: Uche Score: 10    Uche Phase II:      Last vitals: Reviewed and per EMR flowsheets.        Anesthesia Post Evaluation    Patient location during evaluation: bedside  Patient participation: complete - patient participated  Level of consciousness: sleepy but conscious  Airway patency: patent  Nausea & Vomiting: no nausea and no vomiting  Complications: no  Cardiovascular status: hemodynamically stable  Respiratory status: acceptable, spontaneous ventilation and nasal cannula  Hydration status: stable

## 2020-09-08 NOTE — PROGRESS NOTES
Photos taken, scope used , banding completed x 3 bands, no biopsies. EGD completed, Duong Trejo tolerated well.

## 2020-09-08 NOTE — H&P
[address] [address]  Sedation/Analgesia History & Physical    Patient: Jerry Mercado : 1955  Med Rec#: [id number] Acc#: [id number]   Provider Performing Procedure: Felisha Nog  Primary Care Physician: Amanda Barron    PRE-PROCEDURE   Full CODE [x]Yes  DNR-CCA/DNR-CC []Yes   Brief History/Pre-Procedure Diagnosis:varices          MEDICAL HISTORY  []CAD/Valve  []Liver Disease  []Lung Disease []Diabetes  []Hypertension []Renal Disease  []Additional information:       has a past medical history of Diabetes ([facility] Esophageal varices ([facility] GERD (gastroesophageal reflux disease), Gout, Hyperlipidemia, Hypertension, Liver cirrhosis ([facility] Seasonal allergies, and Sleep apnea. SURGICAL HISTORY   has a past surgical history that includes Colonoscopy (2015); cervical fusion (); Upper gastrointestinal endoscopy (2017); Upper gastrointestinal endoscopy (N/A, 2020); Endoscopy, colon, diagnostic; back surgery; and Esophageal varice ligation. Additional information:       ALLERGIES   Allergies as of 2020 - Review Complete 2020   Allergen Reaction Noted    Sulfa antibiotics  11/10/2017     Additional information:       MEDICATIONS   Coumadin Use Last 7 Days [x]No []Yes  Antiplatelet drug therapy use last 7 days  [x]No []Yes  Other anticoagulant use last 7 days  [x]No []Yes    Current Facility-Administered Medications:     0.45 % sodium chloride infusion, , Intravenous, Continuous, Felisha Ngo MD, Last Rate: 75 mL/hr at 20 1047  Prior to Admission medications    Medication Sig Start Date End Date Taking?  Authorizing Provider   polyethylene glycol (GLYCOLAX) 17 g packet Take 17 g by mouth daily as needed for Constipation 20 Yes NAOMIE Campa - CNP   pantoprazole (PROTONIX) 40 MG tablet Take 1 tablet by mouth daily 20  Yes Naomie Mendiola PA-C   Dulaglutide (TRULICITY) 3.36 CK/5.1VF SOPN Inject 1.75 mg into the skin once a week   Yes Historical Provider, MD   dapagliflozin (FARXIGA) 10 MG tablet Take 10 mg by mouth nightly   Yes Historical Provider, MD   atorvastatin (LIPITOR) 10 MG tablet Take 10 mg by mouth nightly   Yes Historical Provider, MD   lisinopril (PRINIVIL;ZESTRIL) 10 MG tablet Take 10 mg by mouth daily   Yes Historical Provider, MD   metFORMIN (GLUCOPHAGE) 1000 MG tablet Take 1,000 mg by mouth 2 times daily (with meals)   Yes Historical Provider, MD   allopurinol (ZYLOPRIM) 300 MG tablet Take 300 mg by mouth daily   Yes Historical Provider, MD   montelukast (SINGULAIR) 10 MG tablet Take 10 mg by mouth nightly   Yes Historical Provider, MD   cetirizine (ZYRTEC) 10 MG tablet Take 10 mg by mouth daily   Yes Historical Provider, MD   Misc Natural Products (OSTEO BI-FLEX ADV JOINT SHIELD PO) Take by mouth daily   Yes Historical Provider, MD   Multiple Vitamins-Minerals (MULTIVITAMIN ADULT PO) Take 1 tablet by mouth daily   Yes Historical Provider, MD     Additional information:       PHYSICAL:   Heart:  [x]Regular rate and rhythm  []Other:    Lungs:  [x]Clear    []Other:    Abdomen: [x]Soft    []Other:    Mental Status: [x]Alert & Oriented  []Other:      VITAL SIGNS   Patient Vitals for the past 24 hrs:   BP Pulse Resp SpO2 Height Weight   09/08/20 1032 126/76 101 16 96 % 6' (1.829 m) 214 lb 6.4 oz (97.3 kg)       PLANNED PROCEDURE  [x]EGD  []Colonoscopy []Flex Sigmoid  []ERCP []EUS   []Cystoscopy  [] CATH [] BRONCH   Consent: I have discussed with the patient and/or the patient representative the indication, alternatives, and the possible risks and/or complications of the planned procedure and the anesthesia methods. The patient and/or patient representative appear to understand and agree to proceed.   SEDATION  Planned agent:[]Midazolam []Meperidine []Sublimaze []Morphine  []Diazepam [x]Propofol  []Other:     ASA Classification: Class 3 - A patient with severe systemic disease that limits activity but is not incapacitating    Airway Assessment:

## 2020-09-08 NOTE — PROGRESS NOTES
1213  In PACU. Resting easily in bed. Vitals stable. 26  Dr. Kathleen Rahman in room speaking with patient and wife. Follow soft diet, repeat EGD 4-6 weeks. 1219  Tolerating liquids well. 1234  Discharged from facility. Instructions given to wife.

## 2020-11-02 ENCOUNTER — HOSPITAL ENCOUNTER (OUTPATIENT)
Age: 65
Discharge: HOME | End: 2020-11-02
Payer: MEDICARE

## 2020-11-02 ENCOUNTER — HOSPITAL ENCOUNTER (OUTPATIENT)
Dept: CT IMAGING | Age: 65
Discharge: HOME | End: 2020-11-02
Payer: MEDICARE

## 2020-11-02 LAB
CREATININE, WHOLE BLOOD: 0.7 MG/DL (ref 0.5–1.2)
ESTIMATED GFR, PCACC: > 90 ML/MIN/1.73M2

## 2020-11-02 PROCEDURE — 82565 ASSAY OF CREATININE: CPT

## 2020-11-02 PROCEDURE — 6360000004 HC RX CONTRAST MEDICATION: Performed by: NURSE PRACTITIONER

## 2020-11-02 PROCEDURE — 74178 CT ABD&PLV WO CNTR FLWD CNTR: CPT

## 2020-11-02 RX ADMIN — IOPAMIDOL 100 ML: 755 INJECTION, SOLUTION INTRAVENOUS at 15:51

## 2020-11-02 RX ADMIN — IOHEXOL 50 ML: 240 INJECTION, SOLUTION INTRATHECAL; INTRAVASCULAR; INTRAVENOUS; ORAL at 15:51

## 2020-11-09 ENCOUNTER — HOSPITAL ENCOUNTER (OUTPATIENT)
Age: 65
Setting detail: SPECIMEN
Discharge: HOME | End: 2020-11-09
Payer: MEDICARE

## 2020-11-09 PROCEDURE — U0003 INFECTIOUS AGENT DETECTION BY NUCLEIC ACID (DNA OR RNA); SEVERE ACUTE RESPIRATORY SYNDROME CORONAVIRUS 2 (SARS-COV-2) (CORONAVIRUS DISEASE [COVID-19]), AMPLIFIED PROBE TECHNIQUE, MAKING USE OF HIGH THROUGHPUT TECHNOLOGIES AS DESCRIBED BY CMS-2020-01-R: HCPCS

## 2020-11-11 LAB — SARS-COV-2: NOT DETECTED

## 2020-11-17 ENCOUNTER — ANESTHESIA (OUTPATIENT)
Dept: ENDOSCOPY | Age: 65
End: 2020-11-17
Payer: MEDICARE

## 2020-11-17 ENCOUNTER — HOSPITAL ENCOUNTER (OUTPATIENT)
Age: 65
Setting detail: OUTPATIENT SURGERY
Discharge: HOME | End: 2020-11-17
Attending: INTERNAL MEDICINE | Admitting: INTERNAL MEDICINE
Payer: MEDICARE

## 2020-11-17 ENCOUNTER — ANESTHESIA EVENT (OUTPATIENT)
Dept: ENDOSCOPY | Age: 65
End: 2020-11-17
Payer: MEDICARE

## 2020-11-17 VITALS
SYSTOLIC BLOOD PRESSURE: 129 MMHG | RESPIRATION RATE: 17 BRPM | OXYGEN SATURATION: 96 % | DIASTOLIC BLOOD PRESSURE: 74 MMHG

## 2020-11-17 VITALS
OXYGEN SATURATION: 96 % | WEIGHT: 220.8 LBS | RESPIRATION RATE: 18 BRPM | DIASTOLIC BLOOD PRESSURE: 81 MMHG | HEART RATE: 85 BPM | TEMPERATURE: 97.8 F | BODY MASS INDEX: 29.91 KG/M2 | SYSTOLIC BLOOD PRESSURE: 127 MMHG | HEIGHT: 72 IN

## 2020-11-17 LAB — GLUCOSE BLD-MCNC: 137 MG/DL (ref 70–108)

## 2020-11-17 PROCEDURE — 6360000002 HC RX W HCPCS: Performed by: NURSE ANESTHETIST, CERTIFIED REGISTERED

## 2020-11-17 PROCEDURE — 7100000010 HC PHASE II RECOVERY - FIRST 15 MIN: Performed by: INTERNAL MEDICINE

## 2020-11-17 PROCEDURE — 2500000003 HC RX 250 WO HCPCS: Performed by: NURSE ANESTHETIST, CERTIFIED REGISTERED

## 2020-11-17 PROCEDURE — 3700000000 HC ANESTHESIA ATTENDED CARE: Performed by: INTERNAL MEDICINE

## 2020-11-17 PROCEDURE — 2580000003 HC RX 258: Performed by: INTERNAL MEDICINE

## 2020-11-17 PROCEDURE — 7100000011 HC PHASE II RECOVERY - ADDTL 15 MIN: Performed by: INTERNAL MEDICINE

## 2020-11-17 PROCEDURE — 82948 REAGENT STRIP/BLOOD GLUCOSE: CPT

## 2020-11-17 PROCEDURE — 3609012300 HC EGD BAND LIGATION ESOPHGEAL/GASTRIC VARICES: Performed by: INTERNAL MEDICINE

## 2020-11-17 PROCEDURE — 3700000001 HC ADD 15 MINUTES (ANESTHESIA): Performed by: INTERNAL MEDICINE

## 2020-11-17 PROCEDURE — 2709999900 HC NON-CHARGEABLE SUPPLY: Performed by: INTERNAL MEDICINE

## 2020-11-17 RX ORDER — PROPOFOL 10 MG/ML
INJECTION, EMULSION INTRAVENOUS PRN
Status: DISCONTINUED | OUTPATIENT
Start: 2020-11-17 | End: 2020-11-17 | Stop reason: SDUPTHER

## 2020-11-17 RX ORDER — FERROUS SULFATE 325(65) MG
325 TABLET ORAL
Status: ON HOLD | COMMUNITY
End: 2021-03-27 | Stop reason: SDUPTHER

## 2020-11-17 RX ORDER — LIDOCAINE HYDROCHLORIDE 20 MG/ML
INJECTION, SOLUTION EPIDURAL; INFILTRATION; INTRACAUDAL; PERINEURAL PRN
Status: DISCONTINUED | OUTPATIENT
Start: 2020-11-17 | End: 2020-11-17 | Stop reason: SDUPTHER

## 2020-11-17 RX ORDER — SODIUM CHLORIDE 450 MG/100ML
INJECTION, SOLUTION INTRAVENOUS CONTINUOUS
Status: DISCONTINUED | OUTPATIENT
Start: 2020-11-17 | End: 2020-11-17 | Stop reason: HOSPADM

## 2020-11-17 RX ADMIN — LIDOCAINE HYDROCHLORIDE 100 MG: 20 INJECTION, SOLUTION EPIDURAL; INFILTRATION; INTRACAUDAL; PERINEURAL at 15:10

## 2020-11-17 RX ADMIN — SODIUM CHLORIDE: 4.5 INJECTION, SOLUTION INTRAVENOUS at 13:21

## 2020-11-17 RX ADMIN — PROPOFOL 250 MG: 10 INJECTION, EMULSION INTRAVENOUS at 15:10

## 2020-11-17 ASSESSMENT — PULMONARY FUNCTION TESTS
PIF_VALUE: 0

## 2020-11-17 ASSESSMENT — ENCOUNTER SYMPTOMS: SHORTNESS OF BREATH: 0

## 2020-11-17 ASSESSMENT — PAIN - FUNCTIONAL ASSESSMENT: PAIN_FUNCTIONAL_ASSESSMENT: 0-10

## 2020-11-17 NOTE — PROGRESS NOTES
Lelo Hernandez is here for egd  Scope used . Pictures taken. 1 band placed. Procedure completed and he tolerated well.

## 2020-11-17 NOTE — PROGRESS NOTES
Beena Doty is in phase 2  Vitals as charted  Wife at bedside   in to speak with them. Discharge instructions reviewed via teach back method and they deny questions.

## 2020-11-17 NOTE — ANESTHESIA PRE PROCEDURE
Department of Anesthesiology  Preprocedure Note       Name:  Darron Medrano   Age:  77 y.o.  :  1955                                          MRN:  [id number]         Date:  2020      Surgeon: Gilma Shanks):  Navdeep Sung MD    Procedure: Procedure(s):  EGD BAND LIGATION    Medications prior to admission:   Prior to Admission medications    Medication Sig Start Date End Date Taking?  Authorizing Provider   ferrous sulfate (IRON 325) 325 (65 Fe) MG tablet Take 325 mg by mouth daily (with breakfast)   Yes Historical Provider, MD   pantoprazole (PROTONIX) 40 MG tablet Take 1 tablet by mouth daily 20  Yes Paul Cornell PA-C   dapagliflozin (FARXIGA) 10 MG tablet Take 10 mg by mouth nightly   Yes Historical Provider, MD   atorvastatin (LIPITOR) 10 MG tablet Take 10 mg by mouth nightly   Yes Historical Provider, MD   lisinopril (PRINIVIL;ZESTRIL) 10 MG tablet Take 10 mg by mouth daily   Yes Historical Provider, MD   metFORMIN (GLUCOPHAGE) 1000 MG tablet Take 1,000 mg by mouth 2 times daily (with meals)   Yes Historical Provider, MD   allopurinol (ZYLOPRIM) 300 MG tablet Take 300 mg by mouth daily   Yes Historical Provider, MD   montelukast (SINGULAIR) 10 MG tablet Take 10 mg by mouth nightly   Yes Historical Provider, MD   cetirizine (ZYRTEC) 10 MG tablet Take 10 mg by mouth daily   Yes Historical Provider, MD   Misc Natural Products (OSTEO BI-FLEX ADV JOINT SHIELD PO) Take by mouth daily   Yes Historical Provider, MD   Multiple Vitamins-Minerals (MULTIVITAMIN ADULT PO) Take 1 tablet by mouth daily   Yes Historical Provider, MD   Dulaglutide (TRULICITY) 0.83 LN/1.1DW SOPN Inject 1.75 mg into the skin once a week    Historical Provider, MD       Current medications:    Current Facility-Administered Medications   Medication Dose Route Frequency Provider Last Rate Last Dose    0.45 % sodium chloride infusion   Intravenous Continuous Navdeep Sung MD 75 mL/hr at 20 1321         Allergies: Allergies   Allergen Reactions    Sulfa Antibiotics      Unsure of reaction mother told him since he was young        Problem List:    Patient Active Problem List   Diagnosis Code    Hereditary hemochromatosis ([facility] E83.110    Calculus of gallbladder with chronic cholecystitis without obstruction K80.10    Abnormal biliary HIDA scan R94.8    GI hemorrhage K92.2    Rectal bleed K62.5    Melena K92.1       Past Medical History:        Diagnosis Date    Arthritis     Diabetes ([facility]     Esophageal varices ([facility]     GERD (gastroesophageal reflux disease)     Gout     History of blood transfusion     Hyperlipidemia     Hypertension     Liver cirrhosis ([facility]     Seasonal allergies     Sleep apnea        Past Surgical History:        Procedure Laterality Date    BACK SURGERY      CERVICAL FUSION  2004    Dr Joe Purple    COLONOSCOPY  04/2015    Dr Mich Stack, COLON, DIAGNOSTIC     [facility] ENDOSCOPY  11/2017    Dr Yaron Urbano N/A 8/11/2020    EGD CONTROL HEMORRHAGE performed by Brad Rodriguez MD at [address] [facility]    UPPER GASTROINTESTINAL ENDOSCOPY Left 9/8/2020    EGD BANDING performed by Nelsy Ly MD at [address] [facility]       Social History:    Social History     Tobacco Use    Smoking status: Former Smoker    Smokeless tobacco: Former User   Substance Use Topics    Alcohol use: Not Currently     Comment: daily-2 beers                                Counseling given: Not Answered      Vital Signs (Current):   Vitals:    11/17/20 1312   BP: 117/75   Pulse: 104   Resp: 16   Temp: 36.6 °C (97.8 °F)   SpO2: 97%   Weight: 220 lb 12.8 oz (100.2 kg)   Height: 6' (1.829 m)                                              BP Readings from Last 3 Encounters:   11/17/20 117/75   09/08/20 121/64   09/08/20 (!) 108/59       NPO Status: Time of last liquid consumption: 0600                        Time of last solid consumption: 2200                        Date of last liquid consumption: 11/17/20                        Date of last solid food consumption: 11/16/20    BMI:   Wt Readings from Last 3 Encounters:   11/17/20 220 lb 12.8 oz (100.2 kg)   09/08/20 214 lb 6.4 oz (97.3 kg)   08/21/20 208 lb 1.6 oz (94.4 kg)     Body mass index is 29.95 kg/m².     CBC:   Lab Results   Component Value Date    WBC 4.0 08/24/2020    RBC 3.07 08/24/2020    HGB 9.4 08/24/2020    HCT 30.1 08/24/2020    MCV 98.0 08/24/2020    RDW 14.6 08/11/2020     08/24/2020       CMP:   Lab Results   Component Value Date     08/21/2020    K 4.4 08/21/2020     08/21/2020    CO2 19 08/21/2020    BUN 9 08/21/2020    CREATININE 0.7 11/02/2020    CREATININE 0.9 08/21/2020    LABGLOM 84 08/21/2020    GLUCOSE 100 08/21/2020    PROT 5.7 08/21/2020    CALCIUM 8.5 08/21/2020    BILITOT 0.9 08/21/2020    ALKPHOS 135 08/21/2020    AST 62 08/21/2020    ALT 42 08/21/2020       POC Tests:   Recent Labs     11/17/20  1324   POCGLU 137*       Coags:   Lab Results   Component Value Date    INR 1.19 08/20/2020    APTT 29.2 08/20/2020       HCG (If Applicable): No results found for: PREGTESTUR, PREGSERUM, HCG, HCGQUANT     ABGs: No results found for: PHART, PO2ART, CRT7KUU, HUG6OTK, BEART, Z6HDXFXB     Type & Screen (If Applicable):  Lab Results   Component Value Date    LABRH POS 08/20/2020       Drug/Infectious Status (If Applicable):  No results found for: HIV, HEPCAB    COVID-19 Screening (If Applicable):   Lab Results   Component Value Date    COVID19 Not Detected 11/09/2020         Anesthesia Evaluation  Patient summary reviewed and Nursing notes reviewed no history of anesthetic complications:   Airway: Mallampati: II  TM distance: >3 FB   Neck ROM: full  Mouth opening: > = 3 FB Dental: normal exam         Pulmonary: breath sounds clear to auscultation  (+) sleep apnea: on CPAP,      (-) shortness of breath Cardiovascular:  Exercise tolerance: no interval change,   (+) hypertension: no interval change,       ECG reviewed  Rhythm: regular  Rate: normal                    Neuro/Psych:               GI/Hepatic/Renal:   (+) GERD: no interval change, liver disease: esophageal varices,           Endo/Other:    (+) Diabetes, . Pt had no PAT visit       Abdominal:   (+) obese,         Vascular:                                        Anesthesia Plan      general and MAC     ASA 3             Anesthetic plan and risks discussed with patient. Plan discussed with attending.     Attending anesthesiologist reviewed and agrees with Pre Eval content              NAOMIE Peraza - CRNA   11/17/2020

## 2020-11-17 NOTE — ANESTHESIA POSTPROCEDURE EVALUATION
Department of Anesthesiology  Postprocedure Note    Patient: Sen Caceres  MRN: [id number]  YOB: 1955  Date of evaluation: 11/17/2020  Time:  3:42 PM     Procedure Summary     Date:  11/17/20 Room / Location:  [address] [place] [address]    Anesthesia Start:  8075 Anesthesia Stop:  5083    Procedure:  EGD BAND LIGATION (Left Esophagus) Diagnosis:  ([facility])    Surgeon:  Danielito Santana MD Responsible Provider:  Bradley Damon MD    Anesthesia Type:  general, MAC ASA Status:  3          Anesthesia Type: general, MAC    Uche Phase I: Uche Score: 9    Uche Phase II:      Last vitals: Reviewed and per EMR flowsheets.        Anesthesia Post Evaluation    Patient location during evaluation: bedside  Patient participation: complete - patient participated  Level of consciousness: awake and alert  Pain score: 0  Airway patency: patent  Nausea & Vomiting: no nausea and no vomiting  Complications: no  Cardiovascular status: hemodynamically stable  Respiratory status: acceptable, room air and spontaneous ventilation  Hydration status: euvolemic

## 2020-11-17 NOTE — PROGRESS NOTES
[facility]  Sedation/Analgesia History & Physical    Patient: Veronica Guzman : 1955  Med Rec#: [id number] Acc#: [id number]   Provider Performing Procedure: Law Gautam  Primary Care Physician: Shea Horowitz    PRE-PROCEDURE   Full CODE [x]Yes  DNR-CCA/DNR-CC []Yes   Brief History/Pre-Procedure Diagnosisvarices:          MEDICAL HISTORY  []CAD/Valve  []Liver Disease  []Lung Disease []Diabetes  []Hypertension []Renal Disease  []Additional information:       has a past medical history of Arthritis, Diabetes ([facility] [address] Esophageal varices ([facility] [address] GERD (gastroesophageal reflux disease), Gout, History of blood transfusion, Hyperlipidemia, Hypertension, Liver cirrhosis ([facility] [address] Seasonal allergies, and Sleep apnea. SURGICAL HISTORY   has a past surgical history that includes Colonoscopy (2015); cervical fusion (); Upper gastrointestinal endoscopy (2017); Upper gastrointestinal endoscopy (N/A, 2020); Endoscopy, colon, diagnostic; back surgery; Esophageal varice ligation; and Upper gastrointestinal endoscopy (Left, 2020). Additional information:       ALLERGIES   Allergies as of 2020 - Review Complete 2020   Allergen Reaction Noted    Sulfa antibiotics  11/10/2017     Additional information:       MEDICATIONS   Coumadin Use Last 7 Days [x]No []Yes  Antiplatelet drug therapy use last 7 days  [x]No []Yes  Other anticoagulant use last 7 days  [x]No []Yes    Current Facility-Administered Medications:     0.45 % sodium chloride infusion, , Intravenous, Continuous, Law Gautam MD, Last Rate: 75 mL/hr at 20 1321  Prior to Admission medications    Medication Sig Start Date End Date Taking?  Authorizing Provider   ferrous sulfate (IRON 325) 325 (65 Fe) MG tablet Take 325 mg by mouth daily (with breakfast)   Yes Historical Provider, MD   pantoprazole (PROTONIX) 40 MG tablet Take 1 tablet by mouth daily 20  Yes Laquetta Kehr, PA-C   dapagliflozin (FARXIGA) 10 MG tablet Take 10 mg by mouth nightly   Yes Historical Provider, MD   atorvastatin (LIPITOR) 10 MG tablet Take 10 mg by mouth nightly   Yes Historical Provider, MD   lisinopril (PRINIVIL;ZESTRIL) 10 MG tablet Take 10 mg by mouth daily   Yes Historical Provider, MD   metFORMIN (GLUCOPHAGE) 1000 MG tablet Take 1,000 mg by mouth 2 times daily (with meals)   Yes Historical Provider, MD   allopurinol (ZYLOPRIM) 300 MG tablet Take 300 mg by mouth daily   Yes Historical Provider, MD   montelukast (SINGULAIR) 10 MG tablet Take 10 mg by mouth nightly   Yes Historical Provider, MD   cetirizine (ZYRTEC) 10 MG tablet Take 10 mg by mouth daily   Yes Historical Provider, MD   Misc Natural Products (OSTEO BI-FLEX ADV JOINT SHIELD PO) Take by mouth daily   Yes Historical Provider, MD   Multiple Vitamins-Minerals (MULTIVITAMIN ADULT PO) Take 1 tablet by mouth daily   Yes Historical Provider, MD   Dulaglutide (TRULICITY) 9.76 LY/5.2SB SOPN Inject 1.75 mg into the skin once a week    Historical Provider, MD     Additional information:       PHYSICAL:   Heart:  [x]Regular rate and rhythm  []Other:    Lungs:  [x]Clear    []Other:    Abdomen: [x]Soft    []Other:    Mental Status: [x]Alert & Oriented  []Other:      VITAL SIGNS   Patient Vitals for the past 24 hrs:   BP Temp Pulse Resp SpO2 Height Weight   11/17/20 1312 117/75 97.8 °F (36.6 °C) 104 16 97 % 6' (1.829 m) 220 lb 12.8 oz (100.2 kg)       PLANNED PROCEDURE  [x]EGD  []Colonoscopy []Flex Sigmoid  []ERCP []EUS   []Cystoscopy  [] CATH [] BRONCH   Consent: I have discussed with the patient and/or the patient representative the indication, alternatives, and the possible risks and/or complications of the planned procedure and the anesthesia methods. The patient and/or patient representative appear to understand and agree to proceed.   SEDATION  Planned agent:[]Midazolam []Meperidine []Sublimaze []Morphine  []Diazepam [x]Propofol  []Other:     ASA Classification: Class 3 - A patient with severe systemic disease that limits activity but is not incapacitating    Airway Assessment: normal    Monitoring and Safety: The patient will be placed on a cardiac monitor and vital signs, pulse oximetry and level of consciousness will be continuously evaluated throughout the procedure. The patient will be closely monitored until recovery from the medications is complete and the patient has returned to baseline status. Respiratory therapy will be on standby during the procedure. [x]Pre-procedure diagnostic studies complete and results available. Comment:    [x]Previous sedation/anesthesia experiences assessed. Comment:    [x]The patient is an appropriate candidate to undergo the planned procedure sedation and anesthesia. (Refer to nursing sedation/analgesia documentation record)  [x]Formulation and discussion of sedation/procedure plan, risks, and expectations with patient and/or responsible adult completed. [x]Patient examined immediately prior to the procedure.  (Refer to nursing sedation/analgesia documentation record)    Nataliia Yuen MD   Electronically signed 11/17/2020 at 2:15 PM

## 2020-11-17 NOTE — POST SEDATION
[facility]  Sedation/Analgesia Post Sedation Record    Patient: Jennifer Cartwright : 1955  Med Rec#: [id number] Acc#: [id number]   Procedure Performed By: Gypsy Carlson  Primary Care Physician: Rafa Carlisle    POST-PROCEDURE    Physicians/Assistants: Gypsy Carlson  Procedure Performed:    Sedation/Anesthesia:     Estimated Blood Loss:          ml  Specimens Removed:  [x]None []Other:      Disposition of Specimen:  []Pathology [x]Other      Complications:   [x]None Immediate []Other:     Post-procedure Diagnosis/Findings:             Recommendations:     varices          Gypsy Carlson MD [facility]  Electronically signed 2020 at 3:27 PM

## 2020-11-18 NOTE — OP NOTE
[address] [place] [address]                                OPERATIVE REPORT    PATIENT NAME: Jerica Foster                     :        1955  MED REC NO:   [id number]                           ROOM:  ACCOUNT NO:   [de-identified]                           ADMIT DATE: 2020  PROVIDER:     Janiya Anderson M.D.    Richard Hermosilloner OF PROCEDURE:  2020    PROCEDURES:  EGD plus variceal banding. SURGEON:  Janiya Anderson MD    INDICATION FOR THE PROCEDURE:  The patient with a history of cirrhosis  and esophageal varices with previous upper GI bleeding and see the preop  note for rest of clinicals. ASA CLASSIFICATION:  III. MEDICATION:  Per Anesthesia. BIOPSY:  None. PHOTOGRAPHS:  Yes. BLOOD LOSS:  10 mL. DESCRIPTION OF THE PROCEDURE:  Informed consent was obtained after  explaining risks and benefits of the procedure and conscious sedation. Possible complications including bleeding, perforation, reaction to  medicine, but not limiting to death, were discussed. Afterwards, GIF-180 gastroscope was advanced through oropharynx,  esophagus, and stomach into the duodenum. Normal-looking duodenal bulb  and second portion. Scope was withdrawn. Widespread portal  hypertensive gastropathy. No gastric varices. Lower esophageal  incompetence. Scope was withdrawn. Esophageal varices were seen. One  column which needs banding. The other two are managed as scar tissue  from previous banding 1 to 2 cm above GE junction and at that point, I  felt we should band the prominent varix. Scope was withdrawn. Six-band  shooter was attached to it and brought into position. At the point of  suction, the varix started oozing, but band was applied and there was no  more bleeding. The patient tolerated the procedure well. IMPRESSION:  Esophageal varices, status post banding.     RECOMMENDATIONS:  We will have EGD in thee months at [facility] and we will  keep an eye and we will continue the rest of the treatment.         Karilyn Babinski, M.D.    D: 11/17/2020 15:37:33       T: 11/17/2020 16:18:53     [email or web address]  Job#: [id number]     Doc#: [id number]    CC:  Family Physician

## 2021-03-24 ENCOUNTER — HOSPITAL ENCOUNTER (INPATIENT)
Age: 66
LOS: 3 days | Discharge: HOME | DRG: 432 | End: 2021-03-27
Attending: EMERGENCY MEDICINE | Admitting: INTERNAL MEDICINE
Payer: MEDICARE

## 2021-03-24 DIAGNOSIS — K92.1 GASTROINTESTINAL HEMORRHAGE WITH MELENA: ICD-10-CM

## 2021-03-24 DIAGNOSIS — K92.2 UPPER GI BLEED: Primary | ICD-10-CM

## 2021-03-24 LAB
ALBUMIN SERPL-MCNC: 3.9 G/DL (ref 3.5–5.1)
ALP BLD-CCNC: 91 U/L (ref 38–126)
ALT SERPL-CCNC: 36 U/L (ref 11–66)
ANION GAP SERPL CALCULATED.3IONS-SCNC: 11 MEQ/L (ref 8–16)
APTT: 30.7 SECONDS (ref 22–38)
AST SERPL-CCNC: 38 U/L (ref 5–40)
BASOPHILS # BLD: 0.9 %
BASOPHILS ABSOLUTE: 0 THOU/MM3 (ref 0–0.1)
BILIRUB SERPL-MCNC: 0.5 MG/DL (ref 0.3–1.2)
BUN BLDV-MCNC: 13 MG/DL (ref 7–22)
CALCIUM SERPL-MCNC: 8.8 MG/DL (ref 8.5–10.5)
CHLORIDE BLD-SCNC: 105 MEQ/L (ref 98–111)
CO2: 22 MEQ/L (ref 23–33)
CREAT SERPL-MCNC: 0.7 MG/DL (ref 0.4–1.2)
EKG ATRIAL RATE: 94 BPM
EKG P AXIS: 54 DEGREES
EKG P-R INTERVAL: 176 MS
EKG Q-T INTERVAL: 384 MS
EKG QRS DURATION: 92 MS
EKG QTC CALCULATION (BAZETT): 480 MS
EKG R AXIS: 2 DEGREES
EKG T AXIS: 56 DEGREES
EKG VENTRICULAR RATE: 94 BPM
EOSINOPHIL # BLD: 2.2 %
EOSINOPHILS ABSOLUTE: 0.1 THOU/MM3 (ref 0–0.4)
ERYTHROCYTE [DISTWIDTH] IN BLOOD BY AUTOMATED COUNT: 19.8 % (ref 11.5–14.5)
ERYTHROCYTE [DISTWIDTH] IN BLOOD BY AUTOMATED COUNT: 56.2 FL (ref 35–45)
GFR SERPL CREATININE-BSD FRML MDRD: > 90 ML/MIN/1.73M2
GLUCOSE BLD-MCNC: 189 MG/DL (ref 70–108)
HCT VFR BLD CALC: 35.7 % (ref 42–52)
HCT VFR BLD CALC: 37.9 % (ref 42–52)
HEMOCCULT STL QL: POSITIVE
HEMOGLOBIN: 10.5 GM/DL (ref 14–18)
HEMOGLOBIN: 11.2 GM/DL (ref 14–18)
IMMATURE GRANS (ABS): 0.01 THOU/MM3 (ref 0–0.07)
IMMATURE GRANULOCYTES: 0.3 %
INR BLD: 1.21 (ref 0.85–1.13)
LIPASE: 79.6 U/L (ref 5.6–51.3)
LYMPHOCYTES # BLD: 22.3 %
LYMPHOCYTES ABSOLUTE: 0.7 THOU/MM3 (ref 1–4.8)
MCH RBC QN AUTO: 23.5 PG (ref 26–33)
MCHC RBC AUTO-ENTMCNC: 29.4 GM/DL (ref 32.2–35.5)
MCV RBC AUTO: 80 FL (ref 80–94)
MONOCYTES # BLD: 9.3 %
MONOCYTES ABSOLUTE: 0.3 THOU/MM3 (ref 0.4–1.3)
MRSA SCREEN RT-PCR: NEGATIVE
NUCLEATED RED BLOOD CELLS: 0 /100 WBC
OSMOLALITY CALCULATION: 280.8 MOSMOL/KG (ref 275–300)
PLATELET # BLD: 111 THOU/MM3 (ref 130–400)
PMV BLD AUTO: 11.8 FL (ref 9.4–12.4)
POTASSIUM REFLEX MAGNESIUM: 4 MEQ/L (ref 3.5–5.2)
RBC # BLD: 4.46 MILL/MM3 (ref 4.7–6.1)
SEG NEUTROPHILS: 65 %
SEGMENTED NEUTROPHILS ABSOLUTE COUNT: 2.1 THOU/MM3 (ref 1.8–7.7)
SODIUM BLD-SCNC: 138 MEQ/L (ref 135–145)
TOTAL PROTEIN: 6.5 G/DL (ref 6.1–8)
TROPONIN T: < 0.01 NG/ML
VANCOMYCIN RESISTANT ENTEROCOCCUS: NEGATIVE
WBC # BLD: 3.2 THOU/MM3 (ref 4.8–10.8)

## 2021-03-24 PROCEDURE — 82272 OCCULT BLD FECES 1-3 TESTS: CPT

## 2021-03-24 PROCEDURE — 85014 HEMATOCRIT: CPT

## 2021-03-24 PROCEDURE — 99283 EMERGENCY DEPT VISIT LOW MDM: CPT

## 2021-03-24 PROCEDURE — 93005 ELECTROCARDIOGRAM TRACING: CPT | Performed by: EMERGENCY MEDICINE

## 2021-03-24 PROCEDURE — 85018 HEMOGLOBIN: CPT

## 2021-03-24 PROCEDURE — 96365 THER/PROPH/DIAG IV INF INIT: CPT

## 2021-03-24 PROCEDURE — 84484 ASSAY OF TROPONIN QUANT: CPT

## 2021-03-24 PROCEDURE — 87081 CULTURE SCREEN ONLY: CPT

## 2021-03-24 PROCEDURE — 6360000002 HC RX W HCPCS: Performed by: INTERNAL MEDICINE

## 2021-03-24 PROCEDURE — 2060000000 HC ICU INTERMEDIATE R&B

## 2021-03-24 PROCEDURE — 85025 COMPLETE CBC W/AUTO DIFF WBC: CPT

## 2021-03-24 PROCEDURE — 2580000003 HC RX 258: Performed by: INTERNAL MEDICINE

## 2021-03-24 PROCEDURE — 87641 MR-STAPH DNA AMP PROBE: CPT

## 2021-03-24 PROCEDURE — C9113 INJ PANTOPRAZOLE SODIUM, VIA: HCPCS | Performed by: EMERGENCY MEDICINE

## 2021-03-24 PROCEDURE — 99223 1ST HOSP IP/OBS HIGH 75: CPT | Performed by: INTERNAL MEDICINE

## 2021-03-24 PROCEDURE — 80053 COMPREHEN METABOLIC PANEL: CPT

## 2021-03-24 PROCEDURE — 36415 COLL VENOUS BLD VENIPUNCTURE: CPT

## 2021-03-24 PROCEDURE — 6360000002 HC RX W HCPCS: Performed by: EMERGENCY MEDICINE

## 2021-03-24 PROCEDURE — 85730 THROMBOPLASTIN TIME PARTIAL: CPT

## 2021-03-24 PROCEDURE — 87500 VANOMYCIN DNA AMP PROBE: CPT

## 2021-03-24 PROCEDURE — 83690 ASSAY OF LIPASE: CPT

## 2021-03-24 PROCEDURE — 2580000003 HC RX 258: Performed by: EMERGENCY MEDICINE

## 2021-03-24 PROCEDURE — 85610 PROTHROMBIN TIME: CPT

## 2021-03-24 RX ORDER — ACETAMINOPHEN 325 MG/1
650 TABLET ORAL EVERY 6 HOURS PRN
Status: DISCONTINUED | OUTPATIENT
Start: 2021-03-24 | End: 2021-03-27 | Stop reason: HOSPADM

## 2021-03-24 RX ORDER — ONDANSETRON 2 MG/ML
4 INJECTION INTRAMUSCULAR; INTRAVENOUS EVERY 6 HOURS PRN
Status: DISCONTINUED | OUTPATIENT
Start: 2021-03-24 | End: 2021-03-27 | Stop reason: HOSPADM

## 2021-03-24 RX ORDER — DEXTROSE MONOHYDRATE 50 MG/ML
100 INJECTION, SOLUTION INTRAVENOUS PRN
Status: DISCONTINUED | OUTPATIENT
Start: 2021-03-24 | End: 2021-03-27 | Stop reason: HOSPADM

## 2021-03-24 RX ORDER — NICOTINE POLACRILEX 4 MG
15 LOZENGE BUCCAL PRN
Status: DISCONTINUED | OUTPATIENT
Start: 2021-03-24 | End: 2021-03-27 | Stop reason: HOSPADM

## 2021-03-24 RX ORDER — SODIUM CHLORIDE 0.9 % (FLUSH) 0.9 %
10 SYRINGE (ML) INJECTION PRN
Status: DISCONTINUED | OUTPATIENT
Start: 2021-03-24 | End: 2021-03-27 | Stop reason: HOSPADM

## 2021-03-24 RX ORDER — DEXTROSE MONOHYDRATE 25 G/50ML
12.5 INJECTION, SOLUTION INTRAVENOUS PRN
Status: DISCONTINUED | OUTPATIENT
Start: 2021-03-24 | End: 2021-03-27 | Stop reason: HOSPADM

## 2021-03-24 RX ORDER — ACETAMINOPHEN 650 MG/1
650 SUPPOSITORY RECTAL EVERY 6 HOURS PRN
Status: DISCONTINUED | OUTPATIENT
Start: 2021-03-24 | End: 2021-03-27 | Stop reason: HOSPADM

## 2021-03-24 RX ORDER — SODIUM CHLORIDE 9 MG/ML
INJECTION, SOLUTION INTRAVENOUS CONTINUOUS
Status: DISCONTINUED | OUTPATIENT
Start: 2021-03-24 | End: 2021-03-25

## 2021-03-24 RX ORDER — SODIUM CHLORIDE 0.9 % (FLUSH) 0.9 %
10 SYRINGE (ML) INJECTION EVERY 12 HOURS SCHEDULED
Status: DISCONTINUED | OUTPATIENT
Start: 2021-03-24 | End: 2021-03-27 | Stop reason: HOSPADM

## 2021-03-24 RX ORDER — PROMETHAZINE HYDROCHLORIDE 25 MG/1
12.5 TABLET ORAL EVERY 6 HOURS PRN
Status: DISCONTINUED | OUTPATIENT
Start: 2021-03-24 | End: 2021-03-27 | Stop reason: HOSPADM

## 2021-03-24 RX ADMIN — OCTREOTIDE ACETATE 25 MCG/HR: 500 INJECTION, SOLUTION INTRAVENOUS; SUBCUTANEOUS at 21:09

## 2021-03-24 RX ADMIN — SODIUM CHLORIDE 80 MG: 9 INJECTION, SOLUTION INTRAVENOUS at 14:03

## 2021-03-24 RX ADMIN — CEFTRIAXONE SODIUM 1000 MG: 1 INJECTION, POWDER, FOR SOLUTION INTRAMUSCULAR; INTRAVENOUS at 21:09

## 2021-03-24 RX ADMIN — SODIUM CHLORIDE 8 MG/HR: 9 INJECTION, SOLUTION INTRAVENOUS at 14:37

## 2021-03-24 RX ADMIN — SODIUM CHLORIDE: 9 INJECTION, SOLUTION INTRAVENOUS at 20:51

## 2021-03-24 ASSESSMENT — PAIN SCALES - GENERAL: PAINLEVEL_OUTOF10: 0

## 2021-03-24 NOTE — ED NOTES
ED to inpatient nurses report    Chief Complaint   Patient presents with    Rectal Bleeding      Present to ED from home  LOC: alert and orientated to name, place, date  Vital signs   Vitals:    03/24/21 1221 03/24/21 1410   BP: 132/79 125/77   Pulse: 94 86   Resp: 16 16   Temp: 98.4 °F (36.9 °C)    SpO2: 99% 98%   Weight:  215 lb (97.5 kg)   Height:  5' 11\" (1.803 m)      Oxygen Baseline RA    Current needs required RA Bipap/Cpap No  LDAs:   Peripheral IV 03/24/21 Right Forearm (Active)   Site Assessment Clean;Dry; Intact 03/24/21 1437   Line Status Infusing 03/24/21 1437   Dressing Status Clean;Dry; Intact 03/24/21 1437     Mobility: Independent  Pending ED orders: None  Present condition: STABLE  Person of contract wife, phone number in chart  Our promise was given to patient    Electronically signed by Galdino Hernandez RN on 3/24/2021 at 2:41 PM       Galdino Hernandez, [address] [place] [address]  03/24/21 1441

## 2021-03-24 NOTE — ED NOTES
Patient resting in bed. Patient updated on room assignment and POC. Wife at bedside. Call light in reach. Will continue to monitor.      Maryam Guerrero RN  03/24/21 7873

## 2021-03-24 NOTE — ED PROVIDER NOTES
[facility] 22 COMPLAINT       Chief Complaint   Patient presents with    Rectal Bleeding       Nurses Notes reviewed and I agree except as noted in the HPI. HISTORY OF PRESENT ILLNESS    Ligia Philippe is a 77 y.o. male. This patient was sent in by [facility]. He has a history of esophageal varices and apparently had a bad upper GI bleed last year. He was told to come in for if melena repeats. He is having black tarry stool today. He does not take iron supplements. No current pain or dyspnea. He states he has not had any alcohol since last year. He had banding procedure last year    REVIEW OF SYSTEMS         No fever, no chest pain, no shortness of breath    Remainder of review of systems is otherwise reviewed as negative. PAST MEDICAL HISTORY    has a past medical history of Arthritis, Diabetes ([facility] [address] Esophageal varices ([facility] [address] GERD (gastroesophageal reflux disease), Gout, History of blood transfusion, Hyperlipidemia, Hypertension, Liver cirrhosis ([facility] [address] Seasonal allergies, and Sleep apnea. SURGICAL HISTORY      has a past surgical history that includes Colonoscopy (04/2015); cervical fusion (2004); Upper gastrointestinal endoscopy (11/2017); Upper gastrointestinal endoscopy (N/A, 8/11/2020); Endoscopy, colon, diagnostic; back surgery; Esophageal varice ligation; Upper gastrointestinal endoscopy (Left, 9/8/2020); and Upper gastrointestinal endoscopy (Left, 11/17/2020).     CURRENT MEDICATIONS       Previous Medications    ALLOPURINOL (ZYLOPRIM) 300 MG TABLET    Take 300 mg by mouth daily    ATORVASTATIN (LIPITOR) 10 MG TABLET    Take 10 mg by mouth nightly    CETIRIZINE (ZYRTEC) 10 MG TABLET    Take 10 mg by mouth daily    DAPAGLIFLOZIN (FARXIGA) 10 MG TABLET    Take 10 mg by mouth nightly    DULAGLUTIDE (TRULICITY) 5.94 UZ/8.8YW SOPN    Inject 1.75 mg into the skin once a week    FERROUS SULFATE (IRON 325) 325 (65 FE) MG TABLET Take 325 mg by mouth daily (with breakfast)    LISINOPRIL (PRINIVIL;ZESTRIL) 10 MG TABLET    Take 10 mg by mouth daily    METFORMIN (GLUCOPHAGE) 1000 MG TABLET    Take 1,000 mg by mouth 2 times daily (with meals)    MISC NATURAL PRODUCTS (OSTEO BI-FLEX ADV JOINT SHIELD PO)    Take by mouth daily    MONTELUKAST (SINGULAIR) 10 MG TABLET    Take 10 mg by mouth nightly    MULTIPLE VITAMINS-MINERALS (MULTIVITAMIN ADULT PO)    Take 1 tablet by mouth daily    PANTOPRAZOLE (PROTONIX) 40 MG TABLET    Take 1 tablet by mouth daily       ALLERGIES     is allergic to sulfa antibiotics. FAMILY HISTORY     He indicated that his mother is . He indicated that his father is . He indicated that his brother is alive. family history includes Cancer in his father; Heart Disease in his brother and mother; José Hope in his father. SOCIAL HISTORY      reports that he has quit smoking. He has quit using smokeless tobacco. He reports previous alcohol use. He reports that he does not use drugs. PHYSICAL EXAM     INITIAL VITALS:  height is 5' 11\" (1.803 m) and weight is 215 lb (97.5 kg). His temperature is 98.4 °F (36.9 °C). His blood pressure is 132/79 and his pulse is 90. His respiration is 18 and oxygen saturation is 96%. Constitutional: Well appearing and non-toxic   Eyes:  Pupils are equal   HENT:  Atraumatic appearing  oropharynx moist, no pharyngeal exudates.   Neck- normal range of motion, supple   Respiratory:  No wheezing, rhonchi or rales  Cardiovascular: regular, no murmur  GI:  Non tender, no rigidity, rebound or guarding  : Rectal exam is grossly positive for melena  Musculoskeletal:  2/4 distal pulses, no pitting edema  Integument: warm and dry  Neurologic:  Alert & oriented x 3  Psychiatric:  Speech and behavior appropriate        DIAGNOSTIC RESULTS     EKG: All EKG's are interpreted by the Emergency Department Physician who either signs or Co-signs this chart in the absence of a cardiologist.  EKG interpreted by me shows sinus rhythm with a rate of 94, QRS of 92, QTc of 40 at 80, axis of 2. No acute ischemic changes. LABS:   Labs Reviewed   CBC WITH AUTO DIFFERENTIAL - Abnormal; Notable for the following components:       Result Value    WBC 3.2 (*)     RBC 4.46 (*)     Hemoglobin 10.5 (*)     Hematocrit 35.7 (*)     MCH 23.5 (*)     MCHC 29.4 (*)     RDW-CV 19.8 (*)     RDW-SD 56.2 (*)     Platelets 635 (*)     Lymphocytes Absolute 0.7 (*)     Monocytes Absolute 0.3 (*)     All other components within normal limits   COMPREHENSIVE METABOLIC PANEL W/ REFLEX TO MG FOR LOW K - Abnormal; Notable for the following components:    Glucose 189 (*)     CO2 22 (*)     All other components within normal limits   LIPASE - Abnormal; Notable for the following components:    Lipase 79.6 (*)     All other components within normal limits   PROTIME-INR - Abnormal; Notable for the following components:    INR 1.21 (*)     All other components within normal limits   BLOOD OCCULT STOOL SCREEN #1   TROPONIN   APTT   ANION GAP   GLOMERULAR FILTRATION RATE, ESTIMATED   OSMOLALITY       EMERGENCY DEPARTMENT COURSE:   Vitals:    Vitals:    03/24/21 1221 03/24/21 1410 03/24/21 1607   BP: 132/79 125/77 132/79   Pulse: 94 86 90   Resp: 16 16 18   Temp: 98.4 °F (36.9 °C)     SpO2: 99% 98% 96%   Weight:  215 lb (97.5 kg)    Height:  5' 11\" (1.803 m)      Patient's hemoglobin is actually higher than the previous level but he is certainly positive for melena. With his history of esophageal varices he will need to come in and be rescoped. I have ordered a Protonix bolus and drip. Case was discussed with the GI group and the hospitalist to arrange for admission. CRITICAL CARE:   15 min         FINAL IMPRESSION      1.  Upper GI bleed          DISPOSITION/PLAN   Admitted    DISCHARGE MEDICATIONS:  New Prescriptions    No medications on file       (Please note that portions of this note were completed with a voice recognition program.  Efforts were made to edit the dictations but occasionally words are mis-transcribed.)    Yary Aranda, [address] [place] [address] Karen,   03/24/21 3484

## 2021-03-24 NOTE — ED NOTES
IV access established. Patient medicated per MAR. Patient expressed no needs at this time. Wife at bedside. Call light in reach. Will continue to monitor.      Macrina Kessler RN  03/24/21 7417

## 2021-03-24 NOTE — ED NOTES
Pt transported to [phone] by wheelchair in stable condition. Called [facility] and informed the charge nurse that the patient was on their way to the unit.       Job Gray LPN  91/20/01 5562

## 2021-03-24 NOTE — ED TRIAGE NOTES
Pt presents with c/o rectal bleeding. He reports dark red blood noted in stool. He denies any other issues or symptoms. He does report hx of esophageal varices that he is being treated for by Dr Lamar Vance. Pt reports calling their office today and they were going to contact the GI on call. Pt reports colonoscopy every 3 years and last one looked ok.

## 2021-03-24 NOTE — H&P
Hospitalist Progress Note   **This is a Medical/ PA/ APRN Student Note and is charted for educational purposes. The non-physician staff attested note is not to be used for billing purposes or to guide patient care. Please see the physician modifications/ attestation for treatment plan/suggestions. This note has been reviewed and feedback has been provided to the student. **      Patient:  Santy Mishra    Unit/Bed:16/016A  YOB: 1955  MRN: [id number]   Acct: [de-identified]   PCP: Patrick Zimmerman  Date of Admission: 3/24/2021    Assessment/Plan:    1. Acute Upper GI bleed: Patient presented to ED stating that he had black stools. Given the history of esophageal varices with banding, the bleeding is most likely a cause of that. VS stable on admission. Hemoglobin is 10.5 in the ED.   3/24/21: GI consulted. Protonix and octreotide infusion. Rocephin started for SBP prophylaxis. Patient NPO for possible procedure. IVF therapy initiated. Continue to monitor stools. Trend hemoglobin. Iron and TIBC ordered for am.   2. History of esophageal varices: Patient had esophageal varices with banding on 8/11/20, 9/8/20, and 11/17/20. The patient had another banding scheduled for 4/20/21 with Dr. Rich Asencio.   3/24/21: GI consulted. Protonix and octreotide infusion. Patient is NPO. 3. History of Liver Cirrhosis: The patient states that he was diagnosed with cirrhosis in 8/2020. Latest CT abdomen on 11/2/20 showed splenomegaly and nodular hepatic contour with changes of portal hypertension. 3/24/21: AST and ALT within normal limits. Continue to monitor. 4. Acute on chronic blood loss anemia: Recent admission from 8/11/20-8/16/20, the patient's hemoglobin dropped by 3.7 and the patient was transfused with 1 unit PRBCs. The patient states that his hemoglobin was checked by his PCP last week, which was about 11. The patient takes iron at home.    3/24/21: Hemoglobin review of systems completed. Pertinent positives noted. Otherwise ROS is negative)     Medications:  Reviewed    Infusion Medications    pantoprozole (PROTONIX) infusion 8 mg/hr (03/24/21 1437)     Scheduled Medications   PRN Meds:     No intake or output data in the 24 hours ending 03/24/21 1520    Diet:  No diet orders on file    Exam:  /77   Pulse 86   Temp 98.4 °F (36.9 °C)   Resp 16   Ht 5' 11\" (1.803 m)   Wt 215 lb (97.5 kg)   SpO2 98%   BMI 29.99 kg/m²   General appearance: male in no apparent distress, appears stated age and cooperative. HEENT: Pupils equal, round, and reactive to light. Conjunctivae/corneas clear. Neck: Supple, with full range of motion. No jugular venous distention. Trachea midline. Respiratory:  Normal respiratory effort. Clear to auscultation, bilaterally without Rales/Wheezes/Rhonchi. Cardiovascular: Regular rate and rhythm with normal S1/S2 without murmurs, rubs or gallops. Abdomen: Soft, non-tender, non-distended with normal bowel sounds. Musculoskeletal: passive and active ROM x 4 extremities. Skin: Skin color, texture, turgor normal.  No rashes or lesions. Neurologic:  Neurovascularly intact without any focal sensory/motor deficits. Cranial nerves: II-XII intact, grossly non-focal.  Psychiatric: Alert and oriented, thought content appropriate, normal insight  Capillary Refill: Brisk,< 3 seconds   Peripheral Pulses: +2 palpable, equal bilaterally     Labs:   Recent Labs     03/24/21  1319   WBC 3.2*   HGB 10.5*   HCT 35.7*   *     Recent Labs     03/24/21  1319      K 4.0      CO2 22*   BUN 13   CREATININE 0.7   CALCIUM 8.8     Recent Labs     03/24/21  1319   AST 38   ALT 36   BILITOT 0.5   ALKPHOS 91     Recent Labs     03/24/21  1319   INR 1.21*     No results for input(s): CKTOTAL, TROPONINI in the last 72 hours.     Microbiology:    Blood culture #1: No results found for: [facility]    Blood culture #2:No results found for: Kimberli Root    Organism:No results found for: ORG    No results found for: LABGRAM    MRSA culture only:No results found for: [address]     Urine culture:   Lab Results   Component Value Date    LABURIN No growth-preliminary No growth  08/11/2020       Respiratory culture: No results found for: CULTRESP    Aerobic and Anaerobic :  No results found for: LABAERO  No results found for: LABANAE    Urinalysis:    No results found for: Guyann , BACTERIA, RBCUA, BLOODU, [address], GLUCOSEU    Radiology:  No orders to display     No results found.     Support Devices (date placed):  [] ETT []Oral / [] Nasal  [] Gastric Tube [] OG / [] NG    [] Central Venous Line (Specify Site):  [] Urinary Catheter  [] Arterial Line (Specify Site)  [x] Peripheral IV access  [] Other:    DVT prophylaxis: [] Lovenox                                 [x] SCDs                                 [] SQ Heparin                                 [x] Encourage ambulation           [] Already on Anticoagulation     Code Status: Prior    Tele:   [x] yes             [] no    Electronically signed by Shruthi Barth on 3/24/2021 at 3:20 PM

## 2021-03-25 ENCOUNTER — APPOINTMENT (OUTPATIENT)
Dept: ULTRASOUND IMAGING | Age: 66
DRG: 432 | End: 2021-03-25
Payer: MEDICARE

## 2021-03-25 LAB
ERYTHROCYTE [DISTWIDTH] IN BLOOD BY AUTOMATED COUNT: 20.2 % (ref 11.5–14.5)
ERYTHROCYTE [DISTWIDTH] IN BLOOD BY AUTOMATED COUNT: 57.4 FL (ref 35–45)
GLUCOSE BLD-MCNC: 109 MG/DL (ref 70–108)
GLUCOSE BLD-MCNC: 154 MG/DL (ref 70–108)
GLUCOSE BLD-MCNC: 170 MG/DL (ref 70–108)
GLUCOSE BLD-MCNC: 195 MG/DL (ref 70–108)
GLUCOSE BLD-MCNC: 229 MG/DL (ref 70–108)
HCT VFR BLD CALC: 36.7 % (ref 42–52)
HCT VFR BLD CALC: 37.1 % (ref 42–52)
HCT VFR BLD CALC: 38.8 % (ref 42–52)
HEMOGLOBIN: 10.9 GM/DL (ref 14–18)
HEMOGLOBIN: 10.9 GM/DL (ref 14–18)
HEMOGLOBIN: 11.2 GM/DL (ref 14–18)
IRON: 47 UG/DL (ref 65–195)
MCH RBC QN AUTO: 23.6 PG (ref 26–33)
MCHC RBC AUTO-ENTMCNC: 29.7 GM/DL (ref 32.2–35.5)
MCV RBC AUTO: 79.6 FL (ref 80–94)
PLATELET # BLD: 114 THOU/MM3 (ref 130–400)
PMV BLD AUTO: 11.5 FL (ref 9.4–12.4)
RBC # BLD: 4.61 MILL/MM3 (ref 4.7–6.1)
SARS-COV-2, NAAT: NOT DETECTED
TOTAL IRON BINDING CAPACITY: 334 UG/DL (ref 171–450)
WBC # BLD: 3.8 THOU/MM3 (ref 4.8–10.8)

## 2021-03-25 PROCEDURE — 85027 COMPLETE CBC AUTOMATED: CPT

## 2021-03-25 PROCEDURE — 85018 HEMOGLOBIN: CPT

## 2021-03-25 PROCEDURE — 6360000002 HC RX W HCPCS: Performed by: INTERNAL MEDICINE

## 2021-03-25 PROCEDURE — 83540 ASSAY OF IRON: CPT

## 2021-03-25 PROCEDURE — 6370000000 HC RX 637 (ALT 250 FOR IP): Performed by: INTERNAL MEDICINE

## 2021-03-25 PROCEDURE — 36415 COLL VENOUS BLD VENIPUNCTURE: CPT

## 2021-03-25 PROCEDURE — 93975 VASCULAR STUDY: CPT

## 2021-03-25 PROCEDURE — C9113 INJ PANTOPRAZOLE SODIUM, VIA: HCPCS | Performed by: INTERNAL MEDICINE

## 2021-03-25 PROCEDURE — 2060000000 HC ICU INTERMEDIATE R&B

## 2021-03-25 PROCEDURE — 85014 HEMATOCRIT: CPT

## 2021-03-25 PROCEDURE — 99233 SBSQ HOSP IP/OBS HIGH 50: CPT | Performed by: INTERNAL MEDICINE

## 2021-03-25 PROCEDURE — 2580000003 HC RX 258: Performed by: INTERNAL MEDICINE

## 2021-03-25 PROCEDURE — 76705 ECHO EXAM OF ABDOMEN: CPT

## 2021-03-25 PROCEDURE — 82948 REAGENT STRIP/BLOOD GLUCOSE: CPT

## 2021-03-25 PROCEDURE — 83550 IRON BINDING TEST: CPT

## 2021-03-25 PROCEDURE — 87635 SARS-COV-2 COVID-19 AMP PRB: CPT

## 2021-03-25 RX ADMIN — INSULIN LISPRO 1 UNITS: 100 INJECTION, SOLUTION INTRAVENOUS; SUBCUTANEOUS at 05:50

## 2021-03-25 RX ADMIN — OCTREOTIDE ACETATE 25 MCG/HR: 500 INJECTION, SOLUTION INTRAVENOUS; SUBCUTANEOUS at 10:50

## 2021-03-25 RX ADMIN — SODIUM CHLORIDE: 9 INJECTION, SOLUTION INTRAVENOUS at 05:56

## 2021-03-25 RX ADMIN — INSULIN LISPRO 1 UNITS: 100 INJECTION, SOLUTION INTRAVENOUS; SUBCUTANEOUS at 23:57

## 2021-03-25 RX ADMIN — SODIUM CHLORIDE 8 MG/HR: 9 INJECTION, SOLUTION INTRAVENOUS at 10:44

## 2021-03-25 RX ADMIN — CEFTRIAXONE SODIUM 1000 MG: 1 INJECTION, POWDER, FOR SOLUTION INTRAMUSCULAR; INTRAVENOUS at 19:58

## 2021-03-25 RX ADMIN — INSULIN LISPRO 2 UNITS: 100 INJECTION, SOLUTION INTRAVENOUS; SUBCUTANEOUS at 18:39

## 2021-03-25 RX ADMIN — SODIUM CHLORIDE 8 MG/HR: 9 INJECTION, SOLUTION INTRAVENOUS at 21:59

## 2021-03-25 RX ADMIN — SODIUM CHLORIDE 8 MG/HR: 9 INJECTION, SOLUTION INTRAVENOUS at 00:57

## 2021-03-25 RX ADMIN — INSULIN LISPRO 1 UNITS: 100 INJECTION, SOLUTION INTRAVENOUS; SUBCUTANEOUS at 11:05

## 2021-03-25 RX ADMIN — SODIUM CHLORIDE 8 MG/HR: 9 INJECTION, SOLUTION INTRAVENOUS at 19:58

## 2021-03-25 ASSESSMENT — PAIN SCALES - GENERAL
PAINLEVEL_OUTOF10: 0
PAINLEVEL_OUTOF10: 0

## 2021-03-25 NOTE — PROGRESS NOTES
admission from 8/11/20-8/16/20, the patient's hemoglobin dropped by 3.7 and the patient was transfused with 1 unit PRBCs. The patient states that his hemoglobin was checked by his PCP last week, which was about 11. The patient takes iron at home. 3/24/21: Hemoglobin on arrival is 10.5. Continue to monitor hemoglobin with CBC in the am.   3/25/21: Hemoglobin 10.9 this am. Iron is decreased at 47, TIBC normal. These findings are likely due to acute blood loss. Continue to monitor hemoglobin with H&H every 12 hours. 5. History of T2DM: Patient takes dulaglutide, dapagliflozin, and metformin at home. 3/24/21: Glucose on arrival is 189. Home medications held. Low dose insulin sliding scale started. Hypoglycemia protocol initiated. 6. History of ROSY: Patient states that he uses CPAP at home. 3/24/21: continue to monitor. 7. History of hemachromatosis: Patient states that he was diagnosed incidentally through routine screening. He has not required phlebotomy in a few years. He follows with hematology for this. 3/24/21: Continue to monitor. 3/25/21: No signs of iron overload at this time. Iron levels decreased at 47, TIBC normal. Recommend outpatient ECHO. 8. History of essential hypertension: Patient takes lisinopril at home. 3/24/21: Blood pressures have been stable in the ED. Lisinopril currently held due to patient being NPO.   3/25/21: Blood pressures continue to be stable. Home meds held for NPO status. Expected discharge date:  3/26/21    Disposition:    [x] Home       [] TCU       [] Rehab       [] Psych       [] SNF       [] [place]       [] Other-    Chief Complaint: blood in the stool     Hospital Treatment Course: (Prior to today) This patient is a 76 y/o male with hx of cirrhosis, esophageal varices, HTN, and T2DM who presented to the ED with c/o dark stools. The patient states that his stool this morning was very dark.  The patient denies blood in the stool or on the toilet paper. The patient admits to feeling fatigued recently, denies weakness. The patient denies hematemesis, nausea, shortness of breath, and chest pain. The patient had a recent hospital stay from 8/11/20-8/16/20 where he was found to have esophageal varices and had 3 bands placed. During this visit, the patient had one unit PRBCs transfused. On 8/20/20, the patient returned with black stools. The patient was stable during admission, was treated medically, and discharged on 8/21/20. The patient had following EGDs with banding on 9/8/20 and 11/17/20 with Dr. Hermelindo Clay. He is scheduled for another EGD with banding on 4/20/21.     3/25/21: The patient states that he is doing okay. He denies having a bowel movement overnight. Patient also denies chest pain, nausea, and vomiting. Awaiting a GI consult to determine the plan of care. Past medical history, family history, social history and allergies reviewed again and is unchanged since admission. ROS (12 point review of systems completed. Pertinent positives noted.  Otherwise ROS is negative)     Medications:  Reviewed    Infusion Medications    pantoprozole (PROTONIX) infusion 8 mg/hr (03/25/21 0057)    sodium chloride 100 mL/hr at 03/25/21 0556    octreotide (SANDOSTATIN) infusion 25 mcg/hr (03/24/21 2109)    dextrose       Scheduled Medications    sodium chloride flush  10 mL Intravenous 2 times per day    cefTRIAXone (ROCEPHIN) IV  1,000 mg Intravenous Q24H    insulin lispro  0-6 Units Subcutaneous Q6H     PRN Meds:       Intake/Output Summary (Last 24 hours) at 3/25/2021 0702  Last data filed at 3/25/2021 0321  Gross per 24 hour   Intake 849.05 ml   Output 0 ml   Net 849.05 ml       Diet:  Diet NPO Effective Now Exceptions are: Ice Chips    Exam:  /64   Pulse 88   Temp 98.7 °F (37.1 °C) (Oral)   Resp 18   Ht 5' 11\" (1.803 m)   Wt 213 lb 6.4 oz (96.8 kg)   SpO2 95%   BMI 29.76 kg/m²   General appearance: male in no apparent distress, appears stated age and cooperative. HEENT: Pupils equal, round, and reactive to light. Conjunctivae/corneas clear. Neck: Supple, with full range of motion. No jugular venous distention. Trachea midline. Respiratory:  Normal respiratory effort. Clear to auscultation, bilaterally without Rales/Wheezes/Rhonchi. Cardiovascular: Regular rate and rhythm with normal S1/S2 without murmurs, rubs or gallops. Abdomen: Soft, non-tender, non-distended with normal bowel sounds. Musculoskeletal: passive and active ROM x 4 extremities. Skin: Skin color, texture, turgor normal.  No rashes or lesions. Neurologic:  Neurovascularly intact without any focal sensory/motor deficits. Cranial nerves: II-XII intact, grossly non-focal.  Psychiatric: Alert and oriented, thought content appropriate, normal insight  Capillary Refill: Brisk,< 3 seconds   Peripheral Pulses: +2 palpable, equal bilaterally     Labs:   Recent Labs     03/24/21  1319 03/24/21  2041 03/25/21  0200   WBC 3.2*  --  3.8*   HGB 10.5* 11.2* 10.9*   HCT 35.7* 37.9* 36.7*   *  --  114*     Recent Labs     03/24/21  1319      K 4.0      CO2 22*   BUN 13   CREATININE 0.7   CALCIUM 8.8     Recent Labs     03/24/21  1319   AST 38   ALT 36   BILITOT 0.5   ALKPHOS 91     Recent Labs     03/24/21  1319   INR 1.21*     No results for input(s): Massimo Butler in the last 72 hours.     Microbiology:    Blood culture #1: No results found for: BC    Blood culture #2:No results found for: Marlene Aguilar    Organism:No results found for: ORG    No results found for: LABGRAM    MRSA culture only:No results found for: [facility]    Urine culture:   Lab Results   Component Value Date    LABURIN No growth-preliminary No growth  08/11/2020       Respiratory culture: No results found for: CULTRESP    Aerobic and Anaerobic :  No results found for: LABAERO  No results found for: LABANAE    Urinalysis:    No results found for: Anjum Melvin, [address], BACTERIA, RBCUA, BLOODU, [address], GLUCOSEU    Radiology:  No orders to display     No results found.     Support Devices (date placed):  [] ETT []Oral / [] Nasal  [] Gastric Tube [] OG / [] NG    [] Central Venous Line (Specify Site):  [] Urinary Catheter  [] Arterial Line (Specify Site)  [x] Peripheral IV access  [] Other:    DVT prophylaxis: [] Lovenox                                 [x] SCDs                                 [] SQ Heparin                                 [x] Encourage ambulation           [] Already on Anticoagulation     Code Status: Full Code    Tele:   [x] yes             [] no    Electronically signed by Sabrina Cole on 3/25/2021 at 7:02 AM

## 2021-03-25 NOTE — CONSULTS
Consult History & Physical      Patient:  Madan Cisneros  YOB: 1955  MRN: [id number]     Acct: [de-identified]    Chief Complaint:    Chief Complaint   Patient presents with    Rectal Bleeding       Date of Service: Pt seen/examined in consultation on 3/25/2021    History Of Present Illness:      77 y.o. male who we are asked to see/evaluate by Sim Byers MD for medical management of melena. He came to the ED yesterday for 2 episodes of melena that started yesterday. Denies abdominal pain, nausea, and vomiting. He denies diarrhea, constipation, SOB, lightheadedness, and dizziness. He has a history of cirrhosis, hemochromatosis, alcohol abuse, and esophageal varices. Last EGD 02/26/21 demonstrated 3 columns of esophageal varices that were in an early stage and not able to be banded, and portal hypertensive gastropathy. He is scheduled for a repeat EGD 04/20/21. Last colonoscopy 2018 demonstrated multiple colon polyps. Pathology demonstrated tubular adenomas. It was recommended he have a repeat colonoscopy in 3 years. He endorses intermittent RUQ \"uncomfortable and doesn't feel right. \" He thinks it may be his gallbladder. Past Medical History:    Past Medical History:   Diagnosis Date    Arthritis     Diabetes ([address]     Esophageal varices (HCC)     GERD (gastroesophageal reflux disease)     Gout     History of blood transfusion     Hyperlipidemia     Hypertension     Liver cirrhosis (HCC)     Seasonal allergies     Sleep apnea        Home Medications:  Prior to Admission medications    Medication Sig Start Date End Date Taking?  Authorizing Provider   ferrous sulfate (IRON 325) 325 (65 Fe) MG tablet Take 325 mg by mouth daily (with breakfast)    Historical Provider, MD   pantoprazole (PROTONIX) 40 MG tablet Take 1 tablet by mouth daily 8/17/20   Naomie Mendiola PA-C   Dulaglutide (TRULICITY) 6.41 SZ/4.9NL SOPN Inject 1.75 mg into the skin once a week    Historical Provider, MD dapagliflozin (FARXIGA) 10 MG tablet Take 10 mg by mouth nightly    Historical Provider, MD   atorvastatin (LIPITOR) 10 MG tablet Take 10 mg by mouth nightly    Historical Provider, MD   lisinopril (PRINIVIL;ZESTRIL) 10 MG tablet Take 10 mg by mouth daily    Historical Provider, MD   metFORMIN (GLUCOPHAGE) 1000 MG tablet Take 1,000 mg by mouth 2 times daily (with meals)    Historical Provider, MD   allopurinol (ZYLOPRIM) 300 MG tablet Take 300 mg by mouth daily    Historical Provider, MD   montelukast (SINGULAIR) 10 MG tablet Take 10 mg by mouth nightly    Historical Provider, MD   cetirizine (ZYRTEC) 10 MG tablet Take 10 mg by mouth daily    Historical Provider, MD   Misc Natural Products (OSTEO BI-FLEX ADV JOINT SHIELD PO) Take by mouth daily    Historical Provider, MD   Multiple Vitamins-Minerals (MULTIVITAMIN ADULT PO) Take 1 tablet by mouth daily    Historical Provider, MD       Surgical History:  Past Surgical History:   Procedure Laterality Date    BACK SURGERY      CERVICAL FUSION  2004    Dr June Cox COLONOSCOPY  04/2015    Dr Delmy Fitzpatrick, COLON, DIAGNOSTIC     [facility] ENDOSCOPY  11/2017    Dr Brianne Grimm N/A 8/11/2020    EGD CONTROL HEMORRHAGE performed by Marcy Dunn MD at [facility]    UPPER GASTROINTESTINAL ENDOSCOPY Left 9/8/2020    EGD BANDING performed by Jah Fuchs MD at [facility] ENDOSCOPY Left 11/17/2020    EGD BAND LIGATION performed by Jah Fuchs MD at [address] Endoscopy       Family History:  Family History   Problem Relation Age of Onset    Heart Disease Mother     Cancer Father         colon    Lung Cancer Father     Heart Disease Brother        Past GI History:  Cirrhosis, hemochromatosis, esophageal varices, colon polyps, family history of colon cancer, EGD, colonoscopy, GERD, chronic anemia  Dr. David Carrillo patient    Allergies: Sulfa antibiotics    Social History:   TOBACCO:   reports that he has quit smoking. He has quit using smokeless tobacco.  ETOH:   reports previous alcohol use. Review Of Systems  GENERAL: No fever, chills or weight loss. EYES:  No  blurred vision, double vision   CARDIOVASCULAR: No chest pain or palpitations. RESPIRATORY:  No dyspnea or cough. GI:  See HPI  MUSCULOSKELETAL: No new painful or swollen joints or myalgias. :   No dysuria or hematuria. SKIN:  No rashes or jaundice. NEUROLOGIC:  No headaches or seizures, numbness or tingling of arms, or legs. PSYCH:  No anxiety or depression. ENDOCRINE:  No polyuria or polydipsia. BLOOD:  +anemia    PHYSICAL EXAM:  BP (!) 148/77   Pulse 76   Temp 97.6 °F (36.4 °C) (Oral)   Resp 18   Ht 5' 11\" (1.803 m)   Wt 213 lb 6.4 oz (96.8 kg)   SpO2 96%   BMI 29.76 kg/m²     General appearance: No apparent distress, appears stated age and cooperative. HEENT: Normal cephalic, atraumatic without obvious deformity. Pupils equal, round, and reactive to light. Neck: Supple, with full range of motion. No jugular venous distention. Trachea midline. Respiratory:  Normal respiratory effort. Clear to auscultation, bilaterally without Rales/Wheezes/Rhonchi. Cardiovascular: Regular rate and rhythm without murmurs, rubs or gallops. Abdomen: Soft, non-tender, non-distended with active bowel sounds. Musculoskeletal: No clubbing, cyanosis or edema bilaterally. Skin: Pink, warm, dry. No rashes or lesions.   Psychiatric: Alert and oriented, thought content appropriate, normal insight  Rectal: negative    Labs:   Recent Labs     03/25/21  0200 03/25/21  0909   WBC 3.8*  --    HGB 10.9* 10.9*   HCT 36.7* 37.1*   *  --      Recent Labs     03/24/21  1319      K 4.0      CO2 22*   BUN 13   CREATININE 0.7   CALCIUM 8.8     Recent Labs     03/24/21  1319   AST 38   ALT 36   BILITOT 0.5   ALKPHOS 91     Recent Labs     03/24/21  1319   INR 1.21* Radiology:   None  Code Status: Full Code    ASSESSMENT:  1. Melena  2. Liver cirrhosis  3. H/O esophageal varices s/p banding 11/2020  4. Chronic anemia  5. GERD  6. H/O HTN  7. HLD  8. H/O hemochromatosis  9. H/O alcohol abuse stopped drinking in 2020  10. DM    PLAN:     Monitor H & H, transfuse prn   Nursing to monitor stool output & document   Continue PPI gtt   Continue Octreotide gtt   Continue Rocephin   Clear liquid diet, nothing red   NPO at midnight   Rapid COVID-19 test   EGD 03/26/21 at 1115 with Dr. Abigail Hearn care per primary team  Will follow       Case reviewed and impression/plan reviewed in collaboration with Dr. Zelaya Screen  Electronically signed by Claude Bullion, APRN - CNP on 3/25/2021 at 2:18 PM    [facility]  Thank you for the consultation.

## 2021-03-25 NOTE — CARE COORDINATION
3/25/21, 1:18 PM EDT  DISCHARGE PLANNING EVALUATION:    Florence Garcia       Admitted: 3/24/2021/ [facility] day: 1   Location: [facility]15/015 Reason for admit: UGI bleed [K92.2]   PMH:  has a past medical history of Arthritis, Diabetes ([facility] [address] Esophageal varices ([facility] [address] GERD (gastroesophageal reflux disease), Gout, History of blood transfusion, Hyperlipidemia, Hypertension, Liver cirrhosis ([facility] [address] Seasonal allergies, and Sleep apnea. Procedure:   Barriers to Discharge:  GI Bleed w history esophageal varices, cirrhosis, ROYS, DM. GI following. Octreotide/PPI gtts continued  PCP: Lina Yi  Readmission Risk Score: 13%    Patient Goals/Plan/Treatment Preferences: met w client; denied needs as plans home w spouse independently PTA when medically cleared  Transportation/Food Security/Housekeeping Addressed:  No issues identified.

## 2021-03-26 ENCOUNTER — ANESTHESIA EVENT (OUTPATIENT)
Dept: ENDOSCOPY | Age: 66
DRG: 432 | End: 2021-03-26
Payer: MEDICARE

## 2021-03-26 ENCOUNTER — ANESTHESIA (OUTPATIENT)
Dept: ENDOSCOPY | Age: 66
DRG: 432 | End: 2021-03-26
Payer: MEDICARE

## 2021-03-26 VITALS
DIASTOLIC BLOOD PRESSURE: 70 MMHG | SYSTOLIC BLOOD PRESSURE: 157 MMHG | OXYGEN SATURATION: 96 % | RESPIRATION RATE: 24 BRPM

## 2021-03-26 LAB
GLUCOSE BLD-MCNC: 171 MG/DL (ref 70–108)
GLUCOSE BLD-MCNC: 183 MG/DL (ref 70–108)
GLUCOSE BLD-MCNC: 210 MG/DL (ref 70–108)
HCT VFR BLD CALC: 36.2 % (ref 42–52)
HEMOGLOBIN: 11.1 GM/DL (ref 14–18)
MRSA SCREEN: NORMAL

## 2021-03-26 PROCEDURE — 06L38CZ OCCLUSION OF ESOPHAGEAL VEIN WITH EXTRALUMINAL DEVICE, VIA NATURAL OR ARTIFICIAL OPENING ENDOSCOPIC: ICD-10-PCS | Performed by: INTERNAL MEDICINE

## 2021-03-26 PROCEDURE — 85018 HEMOGLOBIN: CPT

## 2021-03-26 PROCEDURE — 6360000002 HC RX W HCPCS: Performed by: NURSE ANESTHETIST, CERTIFIED REGISTERED

## 2021-03-26 PROCEDURE — 88305 TISSUE EXAM BY PATHOLOGIST: CPT

## 2021-03-26 PROCEDURE — 94761 N-INVAS EAR/PLS OXIMETRY MLT: CPT

## 2021-03-26 PROCEDURE — 3700000000 HC ANESTHESIA ATTENDED CARE: Performed by: INTERNAL MEDICINE

## 2021-03-26 PROCEDURE — 2060000000 HC ICU INTERMEDIATE R&B

## 2021-03-26 PROCEDURE — 3700000001 HC ADD 15 MINUTES (ANESTHESIA): Performed by: INTERNAL MEDICINE

## 2021-03-26 PROCEDURE — 6370000000 HC RX 637 (ALT 250 FOR IP): Performed by: INTERNAL MEDICINE

## 2021-03-26 PROCEDURE — 2580000003 HC RX 258: Performed by: INTERNAL MEDICINE

## 2021-03-26 PROCEDURE — 2500000003 HC RX 250 WO HCPCS: Performed by: NURSE ANESTHETIST, CERTIFIED REGISTERED

## 2021-03-26 PROCEDURE — 7100000010 HC PHASE II RECOVERY - FIRST 15 MIN: Performed by: INTERNAL MEDICINE

## 2021-03-26 PROCEDURE — 36415 COLL VENOUS BLD VENIPUNCTURE: CPT

## 2021-03-26 PROCEDURE — 85014 HEMATOCRIT: CPT

## 2021-03-26 PROCEDURE — 3609012400 HC EGD TRANSORAL BIOPSY SINGLE/MULTIPLE: Performed by: INTERNAL MEDICINE

## 2021-03-26 PROCEDURE — 7100000011 HC PHASE II RECOVERY - ADDTL 15 MIN: Performed by: INTERNAL MEDICINE

## 2021-03-26 PROCEDURE — 99233 SBSQ HOSP IP/OBS HIGH 50: CPT | Performed by: INTERNAL MEDICINE

## 2021-03-26 PROCEDURE — 2720000010 HC SURG SUPPLY STERILE: Performed by: INTERNAL MEDICINE

## 2021-03-26 PROCEDURE — 6360000002 HC RX W HCPCS: Performed by: INTERNAL MEDICINE

## 2021-03-26 PROCEDURE — 3609012300 HC EGD BAND LIGATION ESOPHGEAL/GASTRIC VARICES: Performed by: INTERNAL MEDICINE

## 2021-03-26 PROCEDURE — 82948 REAGENT STRIP/BLOOD GLUCOSE: CPT

## 2021-03-26 PROCEDURE — C9113 INJ PANTOPRAZOLE SODIUM, VIA: HCPCS | Performed by: INTERNAL MEDICINE

## 2021-03-26 PROCEDURE — 0DB68ZX EXCISION OF STOMACH, VIA NATURAL OR ARTIFICIAL OPENING ENDOSCOPIC, DIAGNOSTIC: ICD-10-PCS | Performed by: INTERNAL MEDICINE

## 2021-03-26 PROCEDURE — 2709999900 HC NON-CHARGEABLE SUPPLY: Performed by: INTERNAL MEDICINE

## 2021-03-26 RX ORDER — ALLOPURINOL 300 MG/1
300 TABLET ORAL DAILY
Status: DISCONTINUED | OUTPATIENT
Start: 2021-03-26 | End: 2021-03-27 | Stop reason: HOSPADM

## 2021-03-26 RX ORDER — CETIRIZINE HYDROCHLORIDE 10 MG/1
10 TABLET ORAL DAILY
Status: DISCONTINUED | OUTPATIENT
Start: 2021-03-26 | End: 2021-03-27 | Stop reason: HOSPADM

## 2021-03-26 RX ORDER — SODIUM CHLORIDE 450 MG/100ML
INJECTION, SOLUTION INTRAVENOUS CONTINUOUS
Status: DISCONTINUED | OUTPATIENT
Start: 2021-03-26 | End: 2021-03-26

## 2021-03-26 RX ORDER — LIDOCAINE HYDROCHLORIDE 20 MG/ML
INJECTION, SOLUTION EPIDURAL; INFILTRATION; INTRACAUDAL; PERINEURAL PRN
Status: DISCONTINUED | OUTPATIENT
Start: 2021-03-26 | End: 2021-03-26 | Stop reason: SDUPTHER

## 2021-03-26 RX ORDER — ATORVASTATIN CALCIUM 10 MG/1
10 TABLET, FILM COATED ORAL NIGHTLY
Status: DISCONTINUED | OUTPATIENT
Start: 2021-03-26 | End: 2021-03-27 | Stop reason: HOSPADM

## 2021-03-26 RX ORDER — PROPOFOL 10 MG/ML
INJECTION, EMULSION INTRAVENOUS PRN
Status: DISCONTINUED | OUTPATIENT
Start: 2021-03-26 | End: 2021-03-26 | Stop reason: SDUPTHER

## 2021-03-26 RX ORDER — SODIUM CHLORIDE 9 MG/ML
INJECTION, SOLUTION INTRAVENOUS CONTINUOUS
Status: DISCONTINUED | OUTPATIENT
Start: 2021-03-26 | End: 2021-03-26

## 2021-03-26 RX ADMIN — OCTREOTIDE ACETATE 25 MCG/HR: 500 INJECTION, SOLUTION INTRAVENOUS; SUBCUTANEOUS at 22:22

## 2021-03-26 RX ADMIN — ATORVASTATIN CALCIUM 10 MG: 10 TABLET, FILM COATED ORAL at 19:57

## 2021-03-26 RX ADMIN — ALLOPURINOL 300 MG: 300 TABLET ORAL at 16:01

## 2021-03-26 RX ADMIN — SODIUM CHLORIDE 8 MG/HR: 9 INJECTION, SOLUTION INTRAVENOUS at 18:45

## 2021-03-26 RX ADMIN — INSULIN LISPRO 1 UNITS: 100 INJECTION, SOLUTION INTRAVENOUS; SUBCUTANEOUS at 06:22

## 2021-03-26 RX ADMIN — SODIUM CHLORIDE 8 MG/HR: 9 INJECTION, SOLUTION INTRAVENOUS at 06:27

## 2021-03-26 RX ADMIN — OCTREOTIDE ACETATE 25 MCG/HR: 500 INJECTION, SOLUTION INTRAVENOUS; SUBCUTANEOUS at 03:29

## 2021-03-26 RX ADMIN — CETIRIZINE HYDROCHLORIDE 10 MG: 10 TABLET, FILM COATED ORAL at 16:01

## 2021-03-26 RX ADMIN — PROPOFOL 50 MG: 10 INJECTION, EMULSION INTRAVENOUS at 10:59

## 2021-03-26 RX ADMIN — PROPOFOL 50 MG: 10 INJECTION, EMULSION INTRAVENOUS at 11:04

## 2021-03-26 RX ADMIN — SODIUM CHLORIDE: 4.5 INJECTION, SOLUTION INTRAVENOUS at 10:42

## 2021-03-26 RX ADMIN — INSULIN LISPRO 2 UNITS: 100 INJECTION, SOLUTION INTRAVENOUS; SUBCUTANEOUS at 17:13

## 2021-03-26 RX ADMIN — ACETAMINOPHEN 650 MG: 325 TABLET ORAL at 12:16

## 2021-03-26 RX ADMIN — PROPOFOL 50 MG: 10 INJECTION, EMULSION INTRAVENOUS at 11:08

## 2021-03-26 RX ADMIN — SODIUM CHLORIDE: 9 INJECTION, SOLUTION INTRAVENOUS at 06:40

## 2021-03-26 RX ADMIN — PROPOFOL 50 MG: 10 INJECTION, EMULSION INTRAVENOUS at 11:01

## 2021-03-26 RX ADMIN — LIDOCAINE HYDROCHLORIDE 100 MG: 20 INJECTION, SOLUTION EPIDURAL; INFILTRATION; INTRACAUDAL; PERINEURAL at 10:58

## 2021-03-26 RX ADMIN — CEFTRIAXONE SODIUM 1000 MG: 1 INJECTION, POWDER, FOR SOLUTION INTRAMUSCULAR; INTRAVENOUS at 19:57

## 2021-03-26 RX ADMIN — ACETAMINOPHEN 650 MG: 325 TABLET ORAL at 17:13

## 2021-03-26 RX ADMIN — PROPOFOL 50 MG: 10 INJECTION, EMULSION INTRAVENOUS at 10:58

## 2021-03-26 RX ADMIN — METFORMIN HYDROCHLORIDE 1000 MG: 500 TABLET ORAL at 17:12

## 2021-03-26 ASSESSMENT — PAIN SCALES - GENERAL
PAINLEVEL_OUTOF10: 6
PAINLEVEL_OUTOF10: 3
PAINLEVEL_OUTOF10: 0
PAINLEVEL_OUTOF10: 5
PAINLEVEL_OUTOF10: 6
PAINLEVEL_OUTOF10: 5

## 2021-03-26 ASSESSMENT — PAIN DESCRIPTION - PAIN TYPE
TYPE: ACUTE PAIN
TYPE: ACUTE PAIN

## 2021-03-26 ASSESSMENT — PAIN DESCRIPTION - DESCRIPTORS: DESCRIPTORS: HEADACHE

## 2021-03-26 NOTE — PROGRESS NOTES
Patient in Endoscopy for EGD. Scope number [id number] utilized. Photos taken and printed to chart. 0.45% Sodium Chloride Fluid ordered, started, and charted on the [facility] for pre-procedure. 1 Specimen sent to lab in 1 Container. EGD Banding completed with  3 Bands placed. Patient tolerated the procedure well.

## 2021-03-26 NOTE — PLAN OF CARE
Problem: Cardiovascular  Goal: Hemodynamic stability  Outcome: Ongoing  Note:   Vitals:    03/25/21 0815 03/25/21 1100 03/25/21 1525 03/25/21 1945   BP: 127/70 (!) 148/77 (!) 142/80 134/72   Pulse: 75 76 76 72   Resp: 16 18 18 16   Temp: 97.2 °F (36.2 °C) 97.6 °F (36.4 °C) 97.5 °F (36.4 °C) 97.6 °F (36.4 °C)   TempSrc: Oral Oral Oral Oral   SpO2: 96% 96% 95% 96%   Weight:       Height:             Problem: GI  Goal: No bowel complications  Outcome: Ongoing  Note: Patient has not had a BM this shift. Bowel sounds active x4. Patient is NPO for EGD tomorrow AM.      Problem: Skin Integrity/Risk  Goal: No skin breakdown during hospitalization  Outcome: Ongoing  Note: Patient shows no signs/symptoms of skin breakdown this shift. Patient ambulates up to chair, and turns self every 2 hours. Problem: Discharge Planning:  Goal: Discharged to appropriate level of care  Description: Discharged to appropriate level of care  Outcome: Ongoing  Note: Patient is from home with spouse and plans to return home at discharge when medically stable. Problem: Pain:  Goal: Pain level will decrease  Description: Pain level will decrease  Outcome: Ongoing  Note: Pain Assessment: 0-10  Pain Level: 0   Patient's Stated Pain Goal: No pain   Is pain goal met at this time? Yes        Care plan reviewed with patient. Patient verbalize understanding of the plan of care and contribute to goal setting.

## 2021-03-26 NOTE — OP NOTE
[facility]  EGD Procedure Note    Patient: Judi Wise  : 1955      Procedure: Esophagogastroduodenoscopy with biopsy, variceal band ligation          Date:  3/26/2021     Endoscopist:   Zay Castillo MD    Referring Physician: Ariadne Goode MD  Primary Care Physician: Meño Valencia    Indications: This is a 77y.o. year old male who presents with melena. He has a history of cirrhosis secondary to hemochromatosis and alcohol abuse c/b EV. Last EGD 21 demonstrated 3 columns of esophageal varices that were in an early stage and not able to be banded, and portal hypertensive gastropathy. He is scheduled for a repeat EGD 21    Anesthesia: MAC per Anesthesia. Please see anesthesia report. Consent:  The patient or their legal guardian has signed a consent, and is aware of the potential risks, benefits, alternatives, and potential complications of this procedure. These include, but are not limited to hemorrhage, bleeding, post procedural pain, perforation, phlebitis, aspiration, hypotension, hypoxia, cardiovascular events such as arrhythmia, and possibly death. Description of Procedure: The patient was then taken to the endoscopy suite and placed in the left lateral decubitus position and the above IV sedation was administered. A forward-viewing Olympus video endoscope was lubricated and inserted through the patient's mouth into the oropharynx. Under direct visualization, the upper esophagus was intubated. The scope was advanced through the esophagus to the extent of the second portion of duodenum. The scope was then withdrawn with good views of the mucosal surfaces. Both forward and retroflexed views of the stomach were obtained. The stomach was decompressed and the scope was completely withdrawn. The patient tolerated the procedure well and was taken to the recovery area in good condition. There were no immediate complications.     Estimated Blood Loss: minimal    Findings:    Esophagus: The GE junction was noted to be at 42 cm. There were two columns of Grade II esophagus in the distal esophagus with stigmata of recent bleeding, red whale sign. Three bands were applied with complete eradication of the varices. Stomach: The stomach appeared moderately erythematous on forward and retroflexed views. Cold biopsies were taken and placed in Jar 1 to rule out Hpylori. There was evidence of oozing of blood on contact of mucosa. Duodenum: The first and 2nd portions of the duodenum appeared normal with normal villous pattern    Recommendations:   - Await pathology.   - Repeat EGD in 4 weeks with repeat banding  - [facility]  - Follow clinical symptoms and laboratory studies for evidence of bleeding  - Continue PPI ggt, octreotide ggt for total of 3 days  - Continue IV antibiotics  - Advance diet to clear liquids and as tolerated    Electronically signed by Alyx Vasquez MD on 3/26/2021 at 11:16 AM    Alyx Vasquez MD  [facility]

## 2021-03-26 NOTE — H&P
MG/0.5ML SOPN Inject 1.75 mg into the skin once a week      dapagliflozin (FARXIGA) 10 MG tablet Take 10 mg by mouth nightly      atorvastatin (LIPITOR) 10 MG tablet Take 10 mg by mouth nightly      lisinopril (PRINIVIL;ZESTRIL) 10 MG tablet Take 10 mg by mouth daily      metFORMIN (GLUCOPHAGE) 1000 MG tablet Take 1,000 mg by mouth 2 times daily (with meals)      allopurinol (ZYLOPRIM) 300 MG tablet Take 300 mg by mouth daily      montelukast (SINGULAIR) 10 MG tablet Take 10 mg by mouth nightly      cetirizine (ZYRTEC) 10 MG tablet Take 10 mg by mouth daily      Misc Natural Products (OSTEO BI-FLEX ADV JOINT SHIELD PO) Take by mouth daily      Multiple Vitamins-Minerals (MULTIVITAMIN ADULT PO) Take 1 tablet by mouth daily         Allergies:  Sulfa antibiotics    Social History:   TOBACCO:   reports that he has quit smoking. He has quit using smokeless tobacco.  ETOH:   reports previous alcohol use. DRUGS:   reports no history of drug use. Family History:       Problem Relation Age of Onset    Heart Disease Mother     Cancer Father         colon    Lung Cancer Father     Heart Disease Brother          PHYSICAL EXAM:      BP (!) 108/55   Pulse 76   Temp 97.7 °F (36.5 °C) (Oral)   Resp 18   Ht 5' 11\" (1.803 m)   Wt 213 lb 6.4 oz (96.8 kg)   SpO2 96%   BMI 29.76 kg/m²  I      Heart:  Regular rate and rhythm    Lungs:  No increased work of breathing    Abdomen:  BS+, soft, non-distended, non-tender, no masses palpated      ASA Grade:  See Anesthesia documentation    Mallampati Classification:  See Anesthesia documentation      ASSESSMENT AND PLAN:    1. Patient is a 77 y.o. male here for an EGD with MAC    2. Procedure options, risks and benefits reviewed with patient. We specifically discussed that risks include but are not limited to infection, bleeding, perforation, death, and missed lesions. Patient expresses understanding.       Electronically signed by Fanta Leone MD on 3/25/2021 at 11:25 PM    Tiago Bravo MD  [facility]

## 2021-03-26 NOTE — ANESTHESIA POSTPROCEDURE EVALUATION
Department of Anesthesiology  Postprocedure Note    Patient: Aleksandar Payne  MRN: [id number]  YOB: 1955  Date of evaluation: 3/26/2021  Time:  11:15 AM     Procedure Summary     Date: 03/26/21 Room / Location: [address] [place] [address]    Anesthesia Start: 1052 Anesthesia Stop: 1115    Procedures:       EGD BIOPSY (N/A )      EGD BAND LIGATION Diagnosis: (GI BLEED)    Surgeons: Myles Bates MD Responsible Provider: Nai De Anda MD    Anesthesia Type: MAC ASA Status: 3          Anesthesia Type: MAC    Uche Phase I:      Uche Phase II:      Last vitals: Reviewed and per EMR flowsheets.        Anesthesia Post Evaluation    Patient location during evaluation: bedside  Patient participation: complete - patient participated  Level of consciousness: awake and alert  Airway patency: patent  Nausea & Vomiting: no nausea and no vomiting  Complications: no  Cardiovascular status: hemodynamically stable  Respiratory status: acceptable, room air and spontaneous ventilation  Hydration status: euvolemic

## 2021-03-26 NOTE — ANESTHESIA PRE PROCEDURE
Department of Anesthesiology  Preprocedure Note       Name:  Jessica Currie   Age:  77 y.o.  :  1955                                          MRN:  [id number]         Date:  3/26/2021      Surgeon: Mandeep Chavarria):  Jennifer Montemayor MD    Procedure: Procedure(s):  EGD ESOPHAGOGASTRODUODENOSCOPY    Medications prior to admission:   Prior to Admission medications    Medication Sig Start Date End Date Taking?  Authorizing Provider   ferrous sulfate (IRON 325) 325 (65 Fe) MG tablet Take 325 mg by mouth daily (with breakfast)    Historical Provider, MD   pantoprazole (PROTONIX) 40 MG tablet Take 1 tablet by mouth daily 20   Naomie Mendiola PA-C   Dulaglutide (TRULICITY) 1.26 AT/5.3BF SOPN Inject 1.75 mg into the skin once a week    Historical Provider, MD   dapagliflozin (FARXIGA) 10 MG tablet Take 10 mg by mouth nightly    Historical Provider, MD   atorvastatin (LIPITOR) 10 MG tablet Take 10 mg by mouth nightly    Historical Provider, MD   lisinopril (PRINIVIL;ZESTRIL) 10 MG tablet Take 10 mg by mouth daily    Historical Provider, MD   metFORMIN (GLUCOPHAGE) 1000 MG tablet Take 1,000 mg by mouth 2 times daily (with meals)    Historical Provider, MD   allopurinol (ZYLOPRIM) 300 MG tablet Take 300 mg by mouth daily    Historical Provider, MD   montelukast (SINGULAIR) 10 MG tablet Take 10 mg by mouth nightly    Historical Provider, MD   cetirizine (ZYRTEC) 10 MG tablet Take 10 mg by mouth daily    Historical Provider, MD   Misc Natural Products (OSTEO BI-FLEX ADV JOINT SHIELD PO) Take by mouth daily    Historical Provider, MD   Multiple Vitamins-Minerals (MULTIVITAMIN ADULT PO) Take 1 tablet by mouth daily    Historical Provider, MD       Current medications:    Current Facility-Administered Medications   Medication Dose Route Frequency Provider Last Rate Last Admin    0.9 % sodium chloride infusion   Intravenous Continuous Blease MD Miguel Angel 75 mL/hr at 21 0640 New Bag at 21 0640    0.45 % sodium chloride infusion   Intravenous Continuous Randy Tee MD 75 mL/hr at 03/26/21 1042 New Bag at 03/26/21 1042    pantoprazole (PROTONIX) 80 mg in sodium chloride 0.9 % 100 mL infusion  8 mg/hr Intravenous Continuous Domingo Pascal MD 10 mL/hr at 03/26/21 0627 8 mg/hr at 03/26/21 7760    sodium chloride flush 0.9 % injection 10 mL  10 mL Intravenous 2 times per day Domingo Pascal MD        sodium chloride flush 0.9 % injection 10 mL  10 mL Intravenous PRN Domingo Pascal MD        promethazine (PHENERGAN) tablet 12.5 mg  12.5 mg Oral Q6H PRN Domingo Pascal MD        Or    ondansetron [facility]) injection 4 mg  4 mg Intravenous Q6H PRN Domingo Pascal MD        acetaminophen (TYLENOL) tablet 650 mg  650 mg Oral Q6H PRN Domingo Pascal MD        Or    acetaminophen (TYLENOL) suppository 650 mg  650 mg Rectal Q6H PRN Domingo Pascal MD        octreotide (SANDOSTATIN) 500 mcg in sodium chloride 0.9 % 100 mL infusion  25 mcg/hr Intravenous Continuous Domingo Pascal MD 5 mL/hr at 03/26/21 0329 25 mcg/hr at 03/26/21 0329    cefTRIAXone (ROCEPHIN) 1000 mg IVPB in 50 mL D5W minibag  1,000 mg Intravenous Q24H Domingo Pascal MD   Stopped at 03/25/21 2031    insulin lispro (HUMALOG) injection vial 0-6 Units  0-6 Units Subcutaneous Q6H Domingo Pascal MD   1 Units at 03/26/21 0622    glucose (GLUTOSE) 40 % oral gel 15 g  15 g Oral PRN Domingo Pascal MD        dextrose 50 % IV solution  12.5 g Intravenous PRN Domingo Pascal MD        glucagon (rDNA) injection 1 mg  1 mg Intramuscular PRN Domingo Pascal MD        dextrose 5 % solution  100 mL/hr Intravenous PRN Domingo Pascal MD           Allergies:     Allergies   Allergen Reactions    Sulfa Antibiotics      Unsure of reaction mother told him since he was young        Problem List:    Patient Active Problem List   Diagnosis Code    [facility] E83.110    Calculus of gallbladder with chronic cholecystitis without obstruction K80.10    Abnormal biliary HIDA scan R94.8    GI hemorrhage K92.2    Rectal bleed K62.5    Melena K92.1    Upper GI bleed K92.2    Idiopathic esophageal varices with bleeding (HCC) I85.01       Past Medical History:        Diagnosis Date    Arthritis     Diabetes [facility]     Esophageal varices (HCC)     GERD (gastroesophageal reflux disease)     Gout     History of blood transfusion     Hyperlipidemia     Hypertension     Liver cirrhosis ([facility]     Seasonal allergies     Sleep apnea        Past Surgical History:        Procedure Laterality Date    BACK SURGERY      CERVICAL FUSION  2004    Dr Chanda Mcdaniel    COLONOSCOPY  04/2015    Dr Gentry Mckeon GASTROINTESTINAL ENDOSCOPY  11/2017    Dr Amanda Pacheco N/A 8/11/2020    EGD CONTROL HEMORRHAGE performed by Solo Pérez MD at [facility]    UPPER GASTROINTESTINAL ENDOSCOPY Left 9/8/2020    EGD BANDING performed by Kavitha Avilez MD at [facility]   [facility] ENDOSCOPY Left 11/17/2020    EGD BAND LIGATION performed by Kavitha Avilez MD at [facility]       Social History:    Social History     Tobacco Use    Smoking status: Former Smoker    Smokeless tobacco: Former User   Substance Use Topics    Alcohol use: Not Currently     Comment: daily-2 beers                                Counseling given: Not Answered      Vital Signs (Current):   Vitals:    03/25/21 2300 03/26/21 0315 03/26/21 0900 03/26/21 0929   BP: (!) 108/55 111/62 113/69 (!) 172/77   Pulse: 76 83 72 72   Resp: 18 16 14 18   Temp: 36.5 °C (97.7 °F) 36.6 °C (97.9 °F) 36.6 °C (97.8 °F)    TempSrc: Oral Oral Oral    SpO2: 96% 96% 95% 97%   Weight:  209 lb 11.2 oz (95.1 kg)     Height:  5' 11\" (1.803 m)                                                BP Readings from Last 3 Encounters:   03/26/21 (!) 172/77   11/17/20 127/81   11/17/20 129/74       NPO Status: Time of last liquid Cardiovascular:  Exercise tolerance: good (>4 METS),   (+) hypertension:, hyperlipidemia                  Neuro/Psych:               GI/Hepatic/Renal:   (+) GERD: well controlled, liver disease: esophageal varices,           Endo/Other:    (+) DiabetesType II DM, , : arthritis:., .                 Abdominal:           Vascular:                                        Anesthesia Plan      MAC     ASA 3       Induction: intravenous. Anesthetic plan and risks discussed with patient. Plan discussed with attending.                   Guzman Wilkins, APRN - CRNA   3/26/2021

## 2021-03-26 NOTE — CARE COORDINATION
3/26/21, 10:03 AM EDT  Client plans home w spouse when medically cleared (EGD w possible banding today)  Patient goals/plan/ treatment preferences discussed by  and . Patient goals/plan/ treatment preferences reviewed with patient/ family. Patient/ family verbalize understanding of discharge plan and are in agreement with goal/plan/treatment preferences. Understanding was demonstrated using the teach back method. AVS provided by RN at time of discharge, which includes all necessary medical information pertaining to the patients current course of illness, treatment, post-discharge goals of care, and treatment preferences.         IMM Letter  IMM Letter date given[de-identified] 03/24/21

## 2021-03-26 NOTE — PROGRESS NOTES
and turgor, no rashes, no suspicious skin lesions noted    Data: (All radiographs, tracings, PFTs, and imaging are personally viewed and interpreted unless otherwise noted).     n/a      Electronically signed by Osorio Churchill MD on 3/26/2021 at 6:42 AM

## 2021-03-27 VITALS
BODY MASS INDEX: 29.36 KG/M2 | RESPIRATION RATE: 18 BRPM | WEIGHT: 209.7 LBS | HEART RATE: 78 BPM | DIASTOLIC BLOOD PRESSURE: 77 MMHG | SYSTOLIC BLOOD PRESSURE: 133 MMHG | OXYGEN SATURATION: 93 % | TEMPERATURE: 98.2 F | HEIGHT: 71 IN

## 2021-03-27 LAB
BASOPHILS # BLD: 1.1 %
BASOPHILS ABSOLUTE: 0 THOU/MM3 (ref 0–0.1)
EOSINOPHIL # BLD: 4.1 %
EOSINOPHILS ABSOLUTE: 0.2 THOU/MM3 (ref 0–0.4)
ERYTHROCYTE [DISTWIDTH] IN BLOOD BY AUTOMATED COUNT: 20.7 % (ref 11.5–14.5)
ERYTHROCYTE [DISTWIDTH] IN BLOOD BY AUTOMATED COUNT: 58.6 FL (ref 35–45)
GLUCOSE BLD-MCNC: 188 MG/DL (ref 70–108)
HCT VFR BLD CALC: 39.8 % (ref 42–52)
HEMOGLOBIN: 11.4 GM/DL (ref 14–18)
HYPOCHROMIA: PRESENT
IMMATURE GRANS (ABS): 0.02 THOU/MM3 (ref 0–0.07)
IMMATURE GRANULOCYTES: 0.5 %
LYMPHOCYTES # BLD: 21.4 %
LYMPHOCYTES ABSOLUTE: 0.9 THOU/MM3 (ref 1–4.8)
MCH RBC QN AUTO: 23.3 PG (ref 26–33)
MCHC RBC AUTO-ENTMCNC: 28.6 GM/DL (ref 32.2–35.5)
MCV RBC AUTO: 81.2 FL (ref 80–94)
MONOCYTES # BLD: 11.1 %
MONOCYTES ABSOLUTE: 0.5 THOU/MM3 (ref 0.4–1.3)
NUCLEATED RED BLOOD CELLS: 0 /100 WBC
PLATELET # BLD: 117 THOU/MM3 (ref 130–400)
PLATELET ESTIMATE: ABNORMAL
PMV BLD AUTO: 11.8 FL (ref 9.4–12.4)
RBC # BLD: 4.9 MILL/MM3 (ref 4.7–6.1)
SCAN OF BLOOD SMEAR: NORMAL
SEG NEUTROPHILS: 61.8 %
SEGMENTED NEUTROPHILS ABSOLUTE COUNT: 2.7 THOU/MM3 (ref 1.8–7.7)
WBC # BLD: 4.4 THOU/MM3 (ref 4.8–10.8)

## 2021-03-27 PROCEDURE — 36415 COLL VENOUS BLD VENIPUNCTURE: CPT

## 2021-03-27 PROCEDURE — 6370000000 HC RX 637 (ALT 250 FOR IP): Performed by: INTERNAL MEDICINE

## 2021-03-27 PROCEDURE — 6370000000 HC RX 637 (ALT 250 FOR IP): Performed by: NURSE PRACTITIONER

## 2021-03-27 PROCEDURE — 2580000003 HC RX 258: Performed by: INTERNAL MEDICINE

## 2021-03-27 PROCEDURE — 6360000002 HC RX W HCPCS: Performed by: INTERNAL MEDICINE

## 2021-03-27 PROCEDURE — 82948 REAGENT STRIP/BLOOD GLUCOSE: CPT

## 2021-03-27 PROCEDURE — 99239 HOSP IP/OBS DSCHRG MGMT >30: CPT | Performed by: INTERNAL MEDICINE

## 2021-03-27 PROCEDURE — C9113 INJ PANTOPRAZOLE SODIUM, VIA: HCPCS | Performed by: INTERNAL MEDICINE

## 2021-03-27 PROCEDURE — 85025 COMPLETE CBC W/AUTO DIFF WBC: CPT

## 2021-03-27 RX ORDER — PANTOPRAZOLE SODIUM 40 MG/1
40 TABLET, DELAYED RELEASE ORAL
Status: DISCONTINUED | OUTPATIENT
Start: 2021-03-27 | End: 2021-03-27 | Stop reason: HOSPADM

## 2021-03-27 RX ORDER — PANTOPRAZOLE SODIUM 40 MG/1
40 TABLET, DELAYED RELEASE ORAL 2 TIMES DAILY
Qty: 60 TABLET | Refills: 3
Start: 2021-03-27

## 2021-03-27 RX ORDER — PANTOPRAZOLE SODIUM 40 MG/1
40 TABLET, DELAYED RELEASE ORAL DAILY
Qty: 30 TABLET | Refills: 3
Start: 2021-03-27 | End: 2021-03-27 | Stop reason: SDUPTHER

## 2021-03-27 RX ORDER — FERROUS SULFATE 325(65) MG
TABLET ORAL
Qty: 30 TABLET | Refills: 3 | COMMUNITY
Start: 2021-03-27 | End: 2021-04-19

## 2021-03-27 RX ORDER — NADOLOL 20 MG/1
20 TABLET ORAL DAILY
Qty: 30 TABLET | Refills: 3 | Status: SHIPPED | OUTPATIENT
Start: 2021-03-27 | End: 2022-07-07

## 2021-03-27 RX ORDER — NADOLOL 40 MG/1
20 TABLET ORAL DAILY
Status: DISCONTINUED | OUTPATIENT
Start: 2021-03-27 | End: 2021-03-27 | Stop reason: HOSPADM

## 2021-03-27 RX ADMIN — SODIUM CHLORIDE 8 MG/HR: 9 INJECTION, SOLUTION INTRAVENOUS at 02:29

## 2021-03-27 RX ADMIN — ACETAMINOPHEN 650 MG: 325 TABLET ORAL at 08:30

## 2021-03-27 RX ADMIN — INSULIN LISPRO 1 UNITS: 100 INJECTION, SOLUTION INTRAVENOUS; SUBCUTANEOUS at 08:33

## 2021-03-27 RX ADMIN — ALLOPURINOL 300 MG: 300 TABLET ORAL at 08:28

## 2021-03-27 RX ADMIN — CETIRIZINE HYDROCHLORIDE 10 MG: 10 TABLET, FILM COATED ORAL at 08:28

## 2021-03-27 RX ADMIN — SODIUM CHLORIDE, PRESERVATIVE FREE 10 ML: 5 INJECTION INTRAVENOUS at 08:29

## 2021-03-27 RX ADMIN — PANTOPRAZOLE SODIUM 40 MG: 40 TABLET, DELAYED RELEASE ORAL at 08:28

## 2021-03-27 RX ADMIN — METFORMIN HYDROCHLORIDE 1000 MG: 500 TABLET ORAL at 08:28

## 2021-03-27 RX ADMIN — NADOLOL 20 MG: 40 TABLET ORAL at 09:52

## 2021-03-27 ASSESSMENT — PAIN SCALES - GENERAL: PAINLEVEL_OUTOF10: 0

## 2021-03-27 ASSESSMENT — PAIN DESCRIPTION - DESCRIPTORS: DESCRIPTORS: HEADACHE

## 2021-03-27 ASSESSMENT — PAIN DESCRIPTION - LOCATION: LOCATION: HEAD

## 2021-03-27 NOTE — PLAN OF CARE
Problem: Cardiovascular  Goal: Hemodynamic stability  3/26/2021 2232 by Jose Martin Blackwell RN  Outcome: Ongoing  Note:   Vitals:    03/26/21 1213 03/26/21 1257 03/26/21 1530 03/26/21 1945   BP: 137/77  130/74 117/67   Pulse: 66  82 69   Resp: 16  14 16   Temp: 98.6 °F (37 °C)  97.8 °F (36.6 °C) 98 °F (36.7 °C)   TempSrc: Oral  Oral Oral   SpO2: 96% 96% 98% 95%   Weight:       Height:          Hgb stable this shift. No s/s of bleeding. 3/26/2021 1804 by Brayan Jacob  Outcome: Met This Shift  Note: Patient's hgb stable 11.2. No s/s of bleeding. VSS     Problem: GI  Goal: No bowel complications  1/32/1330 2232 by Jose Martin Blackwell RN  Outcome: Ongoing  Note: Protonix and Octreotide gtt continued. Patient stable after EGD with banding done today with Dr. Prachi Billy. Patient is tolerating oral diet this shift. 3/26/2021 1804 by Brayan Jacob  Outcome: Ongoing  Note: Protonix and Octreotide gtt continued. Patient went for EGD today with Dr. Prachi Billy. Banding completed. Patient to stay overnight for observations of hgb, possible d/c in am. Tolerating PO diet. Problem: Skin Integrity/Risk  Goal: No skin breakdown during hospitalization  3/26/2021 2232 by Jose Martin Blackwell RN  Outcome: Ongoing  Note: No new skin breakdown noted this shift. Patient moves between bed and chair as a standby assist.  3/26/2021 1804 by Brayan Jacob  Outcome: Met This Shift  Note: No new breakdown noted. Patient makes frequent changes in the bed and chair. Problem: Discharge Planning:  Goal: Discharged to appropriate level of care  Description: Discharged to appropriate level of care  3/26/2021 2232 by Jose Martin Blackwell RN  Outcome: Ongoing  Note: D/C home to wife, possibly tomorrow. 3/26/2021 1804 by Brayan Jacob  Outcome: Ongoing  Note: D/C to home with wife.       Problem: Pain:  Goal: Pain level will decrease  Description: Pain level will decrease  Outcome: Ongoing  Note: Pain Assessment: 0-10  Pain Level: 3   Patient's Stated Pain Goal: No pain   Is pain goal met at this time? No     Non-Pharmaceutical Pain Intervention(s): Relaxation techniques    Care plan reviewed with patient. Patient verbalize understanding of the plan of care and contribute to goal setting.

## 2021-03-27 NOTE — DISCHARGE INSTR - DIET

## 2021-03-27 NOTE — DISCHARGE INSTR - COC
Continuity of Care Form    Patient Name: Daphne Pruitt   :  1955  MRN:  [id number]    [facility] date:  3/24/2021  Discharge date:  ***    Code Status Order: Full Code   Advance Directives:   Advance Care Flowsheet Documentation     Date/Time Healthcare Directive Type of Healthcare Directive Copy in [address] Agent's Name Healthcare Agent's Phone Number    21 0104  No, patient does not have an advance directive for healthcare treatment -- -- -- -- --          Admitting Physician:  Joseph Doe MD  PCP: Carey Lam    Discharging Nurse: [facility] Unit/Room#: 4K-15/015-A  Discharging Unit Phone Number: ***    Emergency Contact:   Extended Emergency Contact Information  Primary Emergency Contact: Milton Daviesmeagans  Address: [address] [facility], [address] [place] [address] Phone: [phone]  Mobile Phone: [phone]  Relation: Spouse    Past Surgical History:  Past Surgical History:   Procedure Laterality Date    BACK SURGERY      CERVICAL FUSION      Dr Yovana Bee    COLONOSCOPY  2015    Dr Maria Isabel Melendez, [address] ENDOSCOPY  2017    Dr Daniel Berry N/A 2020    EGD CONTROL HEMORRHAGE performed by Laly Villalobos MD at [address] Endoscopy   [facility] ENDOSCOPY Left 2020    EGD BANDING performed by Ketan Johns MD at [address] [facility] ENDOSCOPY Left 2020    EGD [facility] LIGATION [facility]       Immunization History:   Immunization History   Administered Date(s) Administered    Influenza Vaccine, unspecified formulation 10/03/2014, 10/02/2015, 10/14/2016    Influenza Whole 2007, 10/20/2008, 10/07/2009    Influenza, Quadv, IM, PF (6 mo and older Fluzone, Flulaval, Fluarix, and 3 yrs and older Afluria) 2017, ***    Intake/Output Summary (Last 24 hours) at 3/27/2021 1002  Last data filed at 3/27/2021 0439  Gross per 24 hour   Intake 1650.33 ml   Output 0 ml   Net 1650.33 ml     I/O last 3 completed shifts:   In: 1650.3 [I.V.:1650.3]  Out: 0     Safety Concerns:     [address] Safety Concerns:344960408}    Impairments/Disabilities:      [facility] Impairments/Disabilities:696276006}    Nutrition Therapy:  Current Nutrition Therapy:   [facility] Diet List:292175046}    Routes of Feeding: {CHP DME Other Feedings:364825308}  Liquids: {Slp liquid thickness:54585}  Daily Fluid Restriction: {CHP DME Yes amt example:077658428}  Last Modified Barium Swallow with Video (Video Swallowing Test): {Done Not Done [facility]:630731615}    Treatments at the Time of Hospital Discharge:   Respiratory Treatments: ***  Oxygen Therapy:  {Therapy; copd oxygen:16417}  Ventilator:    {MH CC Vent HGXK:430021289}    Rehab Therapies: {THERAPEUTIC INTERVENTION:4691316244}  Weight Bearing Status/Restrictions: [address] [place] [address] Weight Bearin}  Other Medical Equipment (for information only, NOT a DME order):  {EQUIPMENT:266927576}  Other Treatments: ***    Patient's personal belongings (please select all that are sent with patient):  {CHP DME Belongings:373472501}    RN SIGNATURE:  {Esignature:728646116}    CASE MANAGEMENT/SOCIAL WORK SECTION    Inpatient Status Date: ***    Readmission Risk Assessment Score:  Readmission Risk              Risk of Unplanned Readmission:        14           Discharging to Facility/ Agency   · Name:   · Address:  · Phone:  · Fax:    Dialysis Facility (if applicable)   · Name:  · Address:  · Dialysis Schedule:  · Phone:  · Fax:    / signature: {Esignature:201711701}    PHYSICIAN SECTION    Prognosis: {Prognosis:9476478554}    Condition at Discharge: [address] Patient Condition:364000383}    Rehab Potential (if transferring to Rehab): {Prognosis:5998053960}    Recommended Labs or Other Treatments After Discharge: ***    Physician Certification: I certify the above information and transfer of Renu Casper  is necessary for the continuing treatment of the diagnosis listed and that he requires {Admit to Appropriate Level of Care:63951} for {GREATER/LESS:962816332} 30 days.      Update Admission H&P: {CHP DME Changes in TVVIN:207822447}    PHYSICIAN SIGNATURE:  {Esignature:005392492}

## 2021-03-27 NOTE — FLOWSHEET NOTE
SPIRITUAL CARE PROGRESS NOTE    Spiritual Assessment:  encountered patient as part of spiritual care rounds. Patient was approachable, receptive of 's presence. Patient shared feeling hopeful, coping with hospitalization and having emotional support from spouse and family. Intervention:  engaged the patient through active listening and was an emotional and spiritual presence. Outcome: Chaplains are available for specific request visits at any time and may be paged via Perfect Serve.      03/26/21 2076   Encounter Summary   Services provided to: Patient   Referral/Consult From: Rounding   Support System Spouse   Continue Visiting Yes  (3/26/2021)   Complexity of Encounter Low   Length of Encounter 15 minutes   Routine   Type Initial   Assessment Approachable;Calm; Hopeful   Intervention Active listening;Explored feelings, thoughts, concerns;Sustaining presence/ Ministry of presence   Outcome Expressed gratitude   Spiritual/Yarsanism   Type Spiritual support     Electronically signed by Uziel Kaba on 3/26/2021 at 10:10 PM.  [facility]

## 2021-03-27 NOTE — DISCHARGE SUMMARY
Hospital Medicine Discharge Summary      Patient Identification:   Hilalry Puckett   : 1955  MRN: [id number]   Account: [de-identified]      Patient's PCP: Valentín Espinoza Date: 3/24/2021     Discharge Date:   3/27/2021      Admitting Physician: Alyson Finn MD     Discharge Physician: Alyson Finn MD     Discharge Diagnoses: Active Hospital Problems    Diagnosis Date Noted    [facility] [I85.01]     Upper GI bleed [K92.2] 2021    GI hemorrhage [K92.2] 2020    Hereditary hemochromatosis ([facility] [E83.110] 2017       The patient was seen and examined on day of discharge and this discharge summary is in conjunction with any daily progress note from day of discharge. Hospital Course:   Hillary Puckett is a 77 y.o. male admitted to [address] [place] [address] on 3/24/2021 for melena. The pt has a hx of cirrhosis and hemochromatosis; the day of admission he passed black stools and presented to ER. He was admitted to Hospitalist Service. He has a hx of esophageal varices. He was started on protonix infusion as well as octreotide and prophylactic Rocephin. His h/h remained stable and he did not require transfusion. He was seen by Dr Fanta Leone and underwent EGD with the finding of varices with stigmata of recent bleeding, red sil sign. The varices were banded. He tolerated it well and had no sign of further bleeding. His discharge h/h was 11.4. He was placed on nadolol by GI and will follow up with them and his primary care. His lisinopril was omitted for the present due to lower pressures in the hospital.      He takes metformin for his diabetes; with hepatic impairment there is caution using this drug; decision whether to continue will be left to his primary care. He describes episodes of intermittent RUQ pain and does have gallstones. He indicates that he will be scheduled for a HIDA scan.   If positive, the use of ursodiol might be a consideration considering the risk of cholecystectomy with liver disease. Exam:     Vitals:  Vitals:    03/26/21 1945 03/26/21 2315 03/27/21 0315 03/27/21 0830   BP: 117/67 (!) 140/70 117/70 133/77   Pulse: 69 65 75 78   Resp: 16 16 18 18   Temp: 98 °F (36.7 °C) 97.8 °F (36.6 °C) 97.5 °F (36.4 °C) 98.2 °F (36.8 °C)   TempSrc: Oral Oral Oral Oral   SpO2: 95% 97% 96% 93%   Weight:       Height:         Weight: Weight: 209 lb 11.2 oz (95.1 kg)     24 hour intake/output:    Intake/Output Summary (Last 24 hours) at 3/27/2021 0936  Last data filed at 3/27/2021 0439  Gross per 24 hour   Intake 1650.33 ml   Output 0 ml   Net 1650.33 ml         General appearance:  No apparent distress, appears stated age and cooperative. HEENT:  Normal cephalic, atraumatic without obvious deformity. Pupils equal, round, and reactive to light. Extra ocular muscles intact. Conjunctivae/corneas clear. Neck: Supple, with full range of motion. No jugular venous distention. Trachea midline. Respiratory:  Normal respiratory effort. Clear to auscultation, bilaterally without Rales/Wheezes/Rhonchi. Cardiovascular:  Regular rate and rhythm with normal S1/S2 without murmurs, rubs or gallops. Abdomen: Soft, non-tender, non-distended with normal bowel sounds. Musculoskeletal:  No clubbing, cyanosis or edema bilaterally. Full range of motion without deformity. Skin: Skin color, texture, turgor normal.  No rashes or lesions. Neurologic:  Neurovascularly intact without any focal sensory/motor deficits. Cranial nerves: II-XII intact, grossly non-focal.  Psychiatric:  Alert and oriented, thought content appropriate, normal insight  Capillary Refill: Brisk,< 3 seconds   Peripheral Pulses: +2 palpable, equal bilaterally       Labs:  For convenience and continuity at follow-up the following most recent labs are provided:      CBC:    Lab Results   Component Value Date    WBC 4.4 03/27/2021    HGB 11.4 03/27/2021    HCT 39.8 03/27/2021     03/27/2021       Renal:    Lab Results   Component Value Date     03/24/2021    K 4.0 03/24/2021     03/24/2021    CO2 22 03/24/2021    BUN 13 03/24/2021    CREATININE 0.7 03/24/2021    CALCIUM 8.8 03/24/2021    PHOS 2.0 08/12/2020         Significant Diagnostic Studies    Radiology:   US GALLBLADDER RUQ   Final Result      1. Coarse echotexture of the liver with nodular contour, findings which may be related to cirrhotic changes. 2. There is a slightly thickened appearance of the gallbladder wall with questionable pericholecystic fluid. The gallbladder contains some calculi and sludge. Acute cholecystitis can't be excluded. Further evaluation with nuclear medicine HIDA scan would    be beneficial for further characterization. **This report has been created using voice recognition software. It may contain minor errors which are inherent in voice recognition technology. **      Final report electronically signed by Dr Di Torre on 3/26/2021 9:22 AM      US LIVER   Final Result      1. Coarse echotexture of the liver with nodular contour, findings which may be related to cirrhotic changes. 2. There is a slightly thickened appearance of the gallbladder wall with questionable pericholecystic fluid. The gallbladder contains some calculi and sludge. Acute cholecystitis can't be excluded. Further evaluation with nuclear medicine HIDA scan would    be beneficial for further characterization. **This report has been created using voice recognition software. It may contain minor errors which are inherent in voice recognition technology. **      Final report electronically signed by Dr Di Torre on 3/26/2021 9:22 AM      US DUP ABD PEL RETRO SCROT COMPLETE   Final Result      1. Coarse echotexture of the liver with nodular contour, findings which may be related to cirrhotic changes.    2. There is a slightly thickened appearance of the gallbladder wall with questionable pericholecystic fluid. The gallbladder contains some calculi and sludge. Acute cholecystitis can't be excluded. Further evaluation with nuclear medicine HIDA scan would    be beneficial for further characterization. **This report has been created using voice recognition software. It may contain minor errors which are inherent in voice recognition technology. **      Final report electronically signed by Dr Rafiq Rod on 3/26/2021 9:22 AM             Consults:     IP CONSULT TO GI    Disposition: Home  Condition at Discharge: Stable    Code Status:  Full Code     Patient Instructions:    Discharge lab work: cbc  Activity: activity as tolerated  Diet: DIET LOW FIBER;       Follow-up visits:   [facility]  [address] [facility]  [address]  [phone]               Discharge Medications:      Kathe Cuellar   Home Medication Instructions UOD:[id number]    Printed on:03/27/21 7382   Medication Information                      allopurinol (ZYLOPRIM) 300 MG tablet  Take 300 mg by mouth daily             atorvastatin (LIPITOR) 10 MG tablet  Take 10 mg by mouth nightly             cetirizine (ZYRTEC) 10 MG tablet  Take 10 mg by mouth daily             dapagliflozin (FARXIGA) 10 MG tablet  Take 10 mg by mouth nightly             Dulaglutide (TRULICITY) 8.82 KU/7.2JN SOPN  Inject 1.75 mg into the skin once a week             ferrous sulfate (IRON 325) 325 (65 Fe) MG tablet  One every other day; take with vitamin C 500 mg             metFORMIN (GLUCOPHAGE) 1000 MG tablet  Take 1,000 mg by mouth 2 times daily (with meals)             Misc Natural Products (OSTEO BI-FLEX ADV JOINT SHIELD PO)  Take by mouth daily             montelukast (SINGULAIR) 10 MG tablet  Take 10 mg by mouth nightly             Multiple Vitamins-Minerals (MULTIVITAMIN ADULT PO)  Take 1 tablet by mouth daily             nadolol (CORGARD) 20 MG tablet  Take 1 tablet by mouth daily pantoprazole (PROTONIX) 40 MG tablet  Take 1 tablet by mouth daily                 Time Spent on discharge is more than 45 minutes in the examination, evaluation, counseling and review of medications and discharge plan. Signed: Thank you Porter Pierre for the opportunity to be involved in this patient's care.     Electronically signed by Nida Charles MD on 3/27/2021 at 9:36 AM

## 2021-03-27 NOTE — PROGRESS NOTES
Gastroenterology Progress Note:     Patient Name:  Marci Demarco   MRN: [id number]  [id number]  YOB: 1955  Admit Date: 3/24/2021 12:10 PM  Primary Care Physician: Edmund Hays   [id number]     Patient seen and examined. 24 hours events and chart reviewed. Patient awake and alert. He denies any abdominal pain nausea or vomiting. Spoke to staff RN no obvious blood in stools. No nausea or vomiting. Patient tolerating low fiber diet. Spoke to patient in regards to starting nadolol for esophageal varices maintenance. We discussed getting a blood pressure cuff and monitoring blood pressure and heart rate. He is going to pick this up on his way home. He will bring the readings into the office. And hold medication for any dizziness. (   )Medications Reviewed ;(   )Old records reviewed    I/O last 3 completed shifts: In: 1610.8 [I.V.:1610.8]  Out: 0   I/O this shift:  In: 165.3 [I.V.:165.3]  Out: -     Diet:  DIET LOW FIBER;      /70   Pulse 75   Temp 97.5 °F (36.4 °C) (Oral)   Resp 18   Ht 5' 11\" (1.803 m)   Wt 209 lb 11.2 oz (95.1 kg)   SpO2 96%   BMI 29.25 kg/m²     Physical Exam:    General:  Nourished in no distress  HEENT: Atraumatic, normocephalic. Moist oral mucous membranes. Nares no drainage. Sclera anicteric. CV: Heart RRR with s1 s2 heard, no murmurs, rubs, gallops. Resp: Even, easy without cough or accessory use. Lungs clear to ascultation bilaterally. Abd: Round, soft, nontender. No hepatosplenomegaly or mass present. Active bowel sounds heard. No distention noted. Ext:  Without cyanosis, clubbing, edema. Skin: Pink, warm, dry  Neuro:  Alert, oriented x3 with no obvious deficits.        Rectal: deferred  Lines/tubes:       Labs: WBC:    Lab Results   Component Value Date    WBC 4.4 03/27/2021     Platelets:    Lab Results   Component Value Date     03/27/2021     Hemoglobin/Hematocrit:    Lab Results   Component Value Date    HGB 11.4 03/27/2021 demonstrated multiple colon polyps. Pathology demonstrated tubular adenomas. It was recommended he have a repeat colonoscopy in 3 years. EGD 3/27/21 Findings:     Esophagus: The GE junction was noted to be at 42 cm. There were two columns of Grade II esophagus in the distal esophagus with stigmata of recent bleeding, red whale sign. Three bands were applied with complete eradication of the varices.      Stomach: The stomach appeared moderately erythematous on forward and retroflexed views. Cold biopsies were taken and placed in Jar 1 to rule out Hpylori. There was evidence of oozing of blood on contact of mucosa.     Duodenum: The first and 2nd portions of the duodenum appeared normal with normal villous pattern       Assessment:  Esophageal varices. Status post banding 3/26/2021   Melena. Recent EGD there was evidence of oozing of blood in the stomach on EGD. And stigmata of recent bleeding in the esophagus/likely esophageal variceal bleed. Liver cirrhosis  Acute on chronic anemia. Hemoglobin this a.m. 11.4 with hematocrit 39.8. Did not require blood transfusion this admission. GERD. On PPI  H/O HTN  HLD  H/O hemochromatosis  H/O alcohol abuse stopped drinking in 2020  DM     Plan:    -Await pathology. Repeat EGD in 4 weeks with repeat banding  Nadolol 20 mg daily can be titrated upward outpatient to 40 mg. Discussed with patient purchasing blood pressure cuff and monitoring blood pressure bid. Call the office and stop medication if dizziness or BP sys less than 100. Continue PPI twice daily x4 weeks  Stop octreotide infusion today at 1600  Advance diet to clear liquids and as tolerated  Okay to discharge later this afternoon after octreotide infusion stopped at 1600. Vira Libman Patient is to follow-up in the office in 2 weeks.     Case reviewed and impression/plan reviewed in collaboration with Dr. Catrina Reynoso CNP for Dr. Ellis Curiel   3/27/2021

## 2021-03-31 ENCOUNTER — HOSPITAL ENCOUNTER (OUTPATIENT)
Age: 66
Discharge: HOME | End: 2021-03-31
Payer: MEDICARE

## 2021-03-31 DIAGNOSIS — K92.1 GASTROINTESTINAL HEMORRHAGE WITH MELENA: ICD-10-CM

## 2021-03-31 LAB
BASOPHILS # BLD: 1.3 %
BASOPHILS ABSOLUTE: 0 THOU/MM3 (ref 0–0.1)
DACROCYTES: ABNORMAL
ELLIPTOCYTES: ABNORMAL
EOSINOPHIL # BLD: 5.2 %
EOSINOPHILS ABSOLUTE: 0.2 THOU/MM3 (ref 0–0.4)
ERYTHROCYTE [DISTWIDTH] IN BLOOD BY AUTOMATED COUNT: 20.7 % (ref 11.5–14.5)
ERYTHROCYTE [DISTWIDTH] IN BLOOD BY AUTOMATED COUNT: 61.3 FL (ref 35–45)
HCT VFR BLD CALC: 39.1 % (ref 42–52)
HEMOGLOBIN: 11.3 GM/DL (ref 14–18)
HYPOCHROMIA: PRESENT
IMMATURE GRANS (ABS): 0.01 THOU/MM3 (ref 0–0.07)
IMMATURE GRANULOCYTES: 0.3 %
LYMPHOCYTES # BLD: 26.5 %
LYMPHOCYTES ABSOLUTE: 1 THOU/MM3 (ref 1–4.8)
MCH RBC QN AUTO: 23.8 PG (ref 26–33)
MCHC RBC AUTO-ENTMCNC: 28.9 GM/DL (ref 32.2–35.5)
MCV RBC AUTO: 82.5 FL (ref 80–94)
MONOCYTES # BLD: 10.5 %
MONOCYTES ABSOLUTE: 0.4 THOU/MM3 (ref 0.4–1.3)
NUCLEATED RED BLOOD CELLS: 0 /100 WBC
PLATELET # BLD: 131 THOU/MM3 (ref 130–400)
PLATELET ESTIMATE: ADEQUATE
PMV BLD AUTO: ABNORMAL FL (ref 9.4–12.4)
POIKILOCYTES: ABNORMAL
RBC # BLD: 4.74 MILL/MM3 (ref 4.7–6.1)
SCAN OF BLOOD SMEAR: NORMAL
SEG NEUTROPHILS: 56.2 %
SEGMENTED NEUTROPHILS ABSOLUTE COUNT: 2.1 THOU/MM3 (ref 1.8–7.7)
WBC # BLD: 3.8 THOU/MM3 (ref 4.8–10.8)

## 2021-03-31 PROCEDURE — 85025 COMPLETE CBC W/AUTO DIFF WBC: CPT

## 2021-03-31 PROCEDURE — 36415 COLL VENOUS BLD VENIPUNCTURE: CPT

## 2021-04-13 ENCOUNTER — HOSPITAL ENCOUNTER (OUTPATIENT)
Age: 66
Discharge: HOME | End: 2021-04-13
Payer: MEDICARE

## 2021-04-13 PROCEDURE — U0005 INFEC AGEN DETEC AMPLI PROBE: HCPCS

## 2021-04-13 PROCEDURE — U0003 INFECTIOUS AGENT DETECTION BY NUCLEIC ACID (DNA OR RNA); SEVERE ACUTE RESPIRATORY SYNDROME CORONAVIRUS 2 (SARS-COV-2) (CORONAVIRUS DISEASE [COVID-19]), AMPLIFIED PROBE TECHNIQUE, MAKING USE OF HIGH THROUGHPUT TECHNOLOGIES AS DESCRIBED BY CMS-2020-01-R: HCPCS

## 2021-04-13 PROCEDURE — 82105 ALPHA-FETOPROTEIN SERUM: CPT

## 2021-04-13 PROCEDURE — 36415 COLL VENOUS BLD VENIPUNCTURE: CPT

## 2021-04-15 LAB
AFP-TUMOR MARKER: 3.9 UG/L
SARS-COV-2: NOT DETECTED

## 2021-04-20 ENCOUNTER — ANESTHESIA EVENT (OUTPATIENT)
Dept: ENDOSCOPY | Age: 66
End: 2021-04-20
Payer: MEDICARE

## 2021-04-20 ENCOUNTER — HOSPITAL ENCOUNTER (OUTPATIENT)
Age: 66
Setting detail: OUTPATIENT SURGERY
Discharge: HOME | End: 2021-04-20
Attending: INTERNAL MEDICINE | Admitting: INTERNAL MEDICINE
Payer: MEDICARE

## 2021-04-20 ENCOUNTER — ANESTHESIA (OUTPATIENT)
Dept: ENDOSCOPY | Age: 66
End: 2021-04-20
Payer: MEDICARE

## 2021-04-20 VITALS
HEART RATE: 61 BPM | OXYGEN SATURATION: 97 % | SYSTOLIC BLOOD PRESSURE: 127 MMHG | RESPIRATION RATE: 16 BRPM | DIASTOLIC BLOOD PRESSURE: 72 MMHG

## 2021-04-20 VITALS
RESPIRATION RATE: 18 BRPM | OXYGEN SATURATION: 96 % | SYSTOLIC BLOOD PRESSURE: 123 MMHG | DIASTOLIC BLOOD PRESSURE: 68 MMHG

## 2021-04-20 PROCEDURE — 2500000003 HC RX 250 WO HCPCS: Performed by: NURSE ANESTHETIST, CERTIFIED REGISTERED

## 2021-04-20 PROCEDURE — 2580000003 HC RX 258: Performed by: INTERNAL MEDICINE

## 2021-04-20 PROCEDURE — 3700000000 HC ANESTHESIA ATTENDED CARE: Performed by: INTERNAL MEDICINE

## 2021-04-20 PROCEDURE — 3700000001 HC ADD 15 MINUTES (ANESTHESIA): Performed by: INTERNAL MEDICINE

## 2021-04-20 PROCEDURE — 6360000002 HC RX W HCPCS: Performed by: NURSE ANESTHETIST, CERTIFIED REGISTERED

## 2021-04-20 PROCEDURE — 3609012300 HC EGD BAND LIGATION ESOPHGEAL/GASTRIC VARICES: Performed by: INTERNAL MEDICINE

## 2021-04-20 PROCEDURE — 7100000010 HC PHASE II RECOVERY - FIRST 15 MIN: Performed by: INTERNAL MEDICINE

## 2021-04-20 PROCEDURE — 7100000011 HC PHASE II RECOVERY - ADDTL 15 MIN: Performed by: INTERNAL MEDICINE

## 2021-04-20 RX ORDER — SODIUM CHLORIDE 450 MG/100ML
INJECTION, SOLUTION INTRAVENOUS CONTINUOUS
Status: DISCONTINUED | OUTPATIENT
Start: 2021-04-20 | End: 2021-04-20 | Stop reason: HOSPADM

## 2021-04-20 RX ORDER — PROPOFOL 10 MG/ML
INJECTION, EMULSION INTRAVENOUS PRN
Status: DISCONTINUED | OUTPATIENT
Start: 2021-04-20 | End: 2021-04-20 | Stop reason: SDUPTHER

## 2021-04-20 RX ORDER — LIDOCAINE HYDROCHLORIDE 20 MG/ML
INJECTION, SOLUTION INFILTRATION; PERINEURAL PRN
Status: DISCONTINUED | OUTPATIENT
Start: 2021-04-20 | End: 2021-04-20 | Stop reason: SDUPTHER

## 2021-04-20 RX ADMIN — PROPOFOL 200 MG: 10 INJECTION, EMULSION INTRAVENOUS at 13:48

## 2021-04-20 RX ADMIN — SODIUM CHLORIDE: 4.5 INJECTION, SOLUTION INTRAVENOUS at 12:23

## 2021-04-20 RX ADMIN — LIDOCAINE HYDROCHLORIDE 100 MG: 20 INJECTION, SOLUTION INFILTRATION; PERINEURAL at 13:48

## 2021-04-20 NOTE — ANESTHESIA PRE PROCEDURE
Department of Anesthesiology  Preprocedure Note       Name:  Arleen Shin   Age:  77 y.o.  :  1955                                          MRN:  [id number]         Date:  2021      Surgeon: Dionne Crews):  Saint Chasten, MD    Procedure: Procedure(s):  EGD BAND LIGATION    Medications prior to admission:   Prior to Admission medications    Medication Sig Start Date End Date Taking? Authorizing Provider   fluticasone (VERAMYST) 27.5 MCG/SPRAY nasal spray 2 sprays by Each Nostril route daily   Yes Historical Provider, MD   nadolol (CORGARD) 20 MG tablet Take 1 tablet by mouth daily  Patient taking differently: Take 60 mg by mouth daily  3/27/21  Yes Brian Beltre MD   pantoprazole (PROTONIX) 40 MG tablet Take 1 tablet by mouth 2 times daily 3/27/21  Yes Brian Beltre MD   dapagliflozin (FARXIGA) 10 MG tablet Take 10 mg by mouth nightly   Yes Historical Provider, MD   atorvastatin (LIPITOR) 10 MG tablet Take 10 mg by mouth nightly   Yes Historical Provider, MD   metFORMIN (GLUCOPHAGE) 1000 MG tablet Take 1,000 mg by mouth 2 times daily (with meals)   Yes Historical Provider, MD   allopurinol (ZYLOPRIM) 300 MG tablet Take 300 mg by mouth daily   Yes Historical Provider, MD   montelukast (SINGULAIR) 10 MG tablet Take 10 mg by mouth nightly   Yes Historical Provider, MD   Misc Natural Products (OSTEO BI-FLEX ADV JOINT SHIELD PO) Take by mouth daily   Yes Historical Provider, MD   Multiple Vitamins-Minerals (MULTIVITAMIN ADULT PO) Take 1 tablet by mouth daily   Yes Historical Provider, MD   cetirizine (ZYRTEC) 10 MG tablet Take 10 mg by mouth daily    Historical Provider, MD       Current medications:    Current Facility-Administered Medications   Medication Dose Route Frequency Provider Last Rate Last Admin    0.45 % sodium chloride infusion   Intravenous Continuous Saint Chasten, MD           Allergies:     Allergies   Allergen Reactions    Sulfa Antibiotics      Unsure of reaction mother told him since BP Readings from Last 3 Encounters:   03/27/21 133/77   03/26/21 (!) 157/70   11/17/20 127/81       NPO Status:                                                                                 BMI:   Wt Readings from Last 3 Encounters:   03/26/21 209 lb 11.2 oz (95.1 kg)   11/17/20 220 lb 12.8 oz (100.2 kg)   09/08/20 214 lb 6.4 oz (97.3 kg)     There is no height or weight on file to calculate BMI.    CBC:   Lab Results   Component Value Date    WBC 3.8 03/31/2021    RBC 4.74 03/31/2021    HGB 11.3 03/31/2021    HCT 39.1 03/31/2021    MCV 82.5 03/31/2021    RDW 14.6 08/11/2020     03/31/2021       CMP:   Lab Results   Component Value Date     03/24/2021    K 4.0 03/24/2021     03/24/2021    CO2 22 03/24/2021    BUN 13 03/24/2021    CREATININE 0.7 03/24/2021    LABGLOM >90 03/24/2021    GLUCOSE 189 03/24/2021    PROT 6.5 03/24/2021    CALCIUM 8.8 03/24/2021    BILITOT 0.5 03/24/2021    ALKPHOS 91 03/24/2021    AST 38 03/24/2021    ALT 36 03/24/2021       POC Tests: No results for input(s): POCGLU, POCNA, POCK, POCCL, POCBUN, POCHEMO, POCHCT in the last 72 hours.     Coags:   Lab Results   Component Value Date    INR 1.21 03/24/2021    APTT 30.7 03/24/2021       HCG (If Applicable): No results found for: PREGTESTUR, PREGSERUM, HCG, HCGQUANT     ABGs: No results found for: PHART, PO2ART, DNO6VNU, CZV1IDR, BEART, T7TNYFZZ     Type & Screen (If Applicable):  Lab Results   Component Value Date    LABRH POS 08/20/2020       Drug/Infectious Status (If Applicable):  No results found for: HIV, HEPCAB    COVID-19 Screening (If Applicable):   Lab Results   Component Value Date    COVID19 Not Detected 04/13/2021           Anesthesia Evaluation  Patient summary reviewed  Airway: Mallampati: III  TM distance: >3 FB     Mouth opening: > = 3 FB Dental:          Pulmonary:   (+) sleep apnea: on CPAP,                             Cardiovascular:    (+) hypertension:, Neuro/Psych:               GI/Hepatic/Renal:   (+) GERD:, liver disease:,           Endo/Other:    (+) Diabetes, . Abdominal:           Vascular:                                        Anesthesia Plan      MAC     ASA 3       Induction: intravenous. Anesthetic plan and risks discussed with patient. Plan discussed with attending.                   NAOMIE Vargas - CRNA   4/20/2021

## 2021-04-20 NOTE — POST SEDATION
[address] [address]  Sedation/Analgesia Post Sedation Record    Patient: Robles Mcclendon : 1955  Med Rec#: [id number] Acc#: [id number]   Procedure Performed By: Chaz Johnson  Primary Care Physician: Tessa Jane    POST-PROCEDURE    Physicians/Assistants: Chaz Johnson  Procedure Performed:    Sedation/Anesthesia:     Estimated Blood Loss:          ml  Specimens Removed:  [x]None []Other:      Disposition of Specimen:  []Pathology [x]Other      Complications:   [x]None Immediate []Other:     Post-procedure Diagnosis/Findings:             Recommendations:     rudyes          Chaz Johnson MD [facility]  Electronically signed 2021 at 1:59 PM

## 2021-04-20 NOTE — PROGRESS NOTES
1407:  Luan Dears arrived to endo phase II recovery room. , wife at bedside. Dr. Shoemaker Fuel here to speak with both regarding procedure & plan of care. 1415:  Justinaus Dears denies pain at this time. 1430: Discharge instructions given to pt & wife with verbal understanding.     1434:  Justinaus Dears sitting up drinking fluids, no C/O N/V.

## 2021-04-20 NOTE — H&P
[address] [address]  Sedation/Analgesia History & Physical    Patient: Shade Hernandez : 1955  Med Rec#: [id number] Acc#: [id number]   Provider Performing Procedure: Frances Cordon  Primary Care Physician: Shiela Saavedra    PRE-PROCEDURE   Full CODE [x]Yes  DNR-CCA/DNR-CC []Yes   Brief History/Pre-Procedure Diagnosis:cirrhosis          MEDICAL HISTORY  []CAD/Valve  []Liver Disease  []Lung Disease []Diabetes  []Hypertension []Renal Disease  []Additional information:       has a past medical history of Arthritis, Diabetes ([facility] [address] Esophageal varices ([facility] [address] GERD (gastroesophageal reflux disease), Gout, History of blood transfusion, Hyperlipidemia, Hypertension, Liver cirrhosis ([facility] [address] Seasonal allergies, and Sleep apnea. SURGICAL HISTORY   has a past surgical history that includes Colonoscopy (2015); cervical fusion (); Upper gastrointestinal endoscopy (2017); Upper gastrointestinal endoscopy (N/A, 2020); Endoscopy, colon, diagnostic; back surgery; Esophageal varice ligation; Upper gastrointestinal endoscopy (Left, 2020); Upper gastrointestinal endoscopy (Left, 2020); Upper gastrointestinal endoscopy (N/A, 3/26/2021); and Upper gastrointestinal endoscopy (3/26/2021). Additional information:       ALLERGIES   Allergies as of 2021 - Review Complete 2020   Allergen Reaction Noted    Sulfa antibiotics  11/10/2017     Additional information:       MEDICATIONS   Coumadin Use Last 7 Days [x]No []Yes  Antiplatelet drug therapy use last 7 days  [x]No []Yes  Other anticoagulant use last 7 days  [x]No []Yes    Current Facility-Administered Medications:     0.45 % sodium chloride infusion, , Intravenous, Continuous, Frances Cordon MD, Last Rate: 75 mL/hr at 21 1223, New Bag at 21 1223  Prior to Admission medications    Medication Sig Start Date End Date Taking?  Authorizing Provider   fluticasone (VERAMYST) 27.5 MCG/SPRAY nasal spray 2 sprays by Each Nostril route daily   Yes Historical Provider, MD   nadolol (CORGARD) 20 MG tablet Take 1 tablet by mouth daily  Patient taking differently: Take 60 mg by mouth daily  3/27/21  Yes Ivy Cornea, MD   pantoprazole (PROTONIX) 40 MG tablet Take 1 tablet by mouth 2 times daily 3/27/21  Yes Ivy Cornea, MD   dapagliflozin (FARXIGA) 10 MG tablet Take 10 mg by mouth nightly   Yes Historical Provider, MD   atorvastatin (LIPITOR) 10 MG tablet Take 10 mg by mouth nightly   Yes Historical Provider, MD   metFORMIN (GLUCOPHAGE) 1000 MG tablet Take 1,000 mg by mouth 2 times daily (with meals)   Yes Historical Provider, MD   allopurinol (ZYLOPRIM) 300 MG tablet Take 300 mg by mouth daily   Yes Historical Provider, MD   montelukast (SINGULAIR) 10 MG tablet Take 10 mg by mouth nightly   Yes Historical Provider, MD   Misc Natural Products (OSTEO BI-FLEX ADV JOINT SHIELD PO) Take by mouth daily   Yes Historical Provider, MD   Multiple Vitamins-Minerals (MULTIVITAMIN ADULT PO) Take 1 tablet by mouth daily   Yes Historical Provider, MD   cetirizine (ZYRTEC) 10 MG tablet Take 10 mg by mouth daily    Historical Provider, MD     Additional information:       PHYSICAL:   Heart:  [x]Regular rate and rhythm  []Other:    Lungs:  [x]Clear    []Other:    Abdomen: [x]Soft    []Other:    Mental Status: [x]Alert & Oriented  []Other:      VITAL SIGNS   No data found. PLANNED PROCEDURE  [x]EGD  []Colonoscopy []Flex Sigmoid  []ERCP []EUS   []Cystoscopy  [] CATH [] BRONCH   Consent: I have discussed with the patient and/or the patient representative the indication, alternatives, and the possible risks and/or complications of the planned procedure and the anesthesia methods. The patient and/or patient representative appear to understand and agree to proceed.   SEDATION  Planned agent:[]Midazolam []Meperidine []Sublimaze []Morphine  []Diazepam [x]Propofol  []Other:     ASA Classification: Class 3 - A patient with severe systemic disease that limits activity but is not incapacitating    Airway Assessment: normal    Monitoring and Safety: The patient will be placed on a cardiac monitor and vital signs, pulse oximetry and level of consciousness will be continuously evaluated throughout the procedure. The patient will be closely monitored until recovery from the medications is complete and the patient has returned to baseline status. Respiratory therapy will be on standby during the procedure. [x]Pre-procedure diagnostic studies complete and results available. Comment:    [x]Previous sedation/anesthesia experiences assessed. Comment:    [x]The patient is an appropriate candidate to undergo the planned procedure sedation and anesthesia. (Refer to nursing sedation/analgesia documentation record)  [x]Formulation and discussion of sedation/procedure plan, risks, and expectations with patient and/or responsible adult completed. [x]Patient examined immediately prior to the procedure.  (Refer to nursing sedation/analgesia documentation record)    Ana Farias MD   Electronically signed 4/20/2021 at 1:35 PM

## 2021-04-21 NOTE — PROCEDURES
[address] [place] [address]                                 PROCEDURE NOTE    PATIENT NAME: Damion Davalos                     :        1955  MED REC NO:   [id number]                           ROOM:  ACCOUNT NO:   [de-identified]                           ADMIT DATE: 2021  PROVIDER:     NOLAN Rasmussen Mournxin OF PROCEDURE:  2021    SURGEON:  Barbie Jesus MD    PROCEDURE:  EGD plus banding. INDICATION FOR THE PROCEDURE:  The patient is here with a history of  cirrhosis and portal hypertension and variceal bleeds. The patient had  three bands put in four weeks back and is here for ongoing evaluation  for variceal bleed. See the preop note for rest of clinicals. ASA CLASSIFICATION:  III. MEDICATIONS:  Per Anesthesia. BIOPSY:  None. PHOTOGRAPHS:  Yes. DESCRIPTION OF PROCEDURE:  Informed consent was obtained after  explaining risks and benefits of the procedure and conscious sedation. Possible complications including bleeding, perforation, reaction to  medicine, but not limiting to death, were discussed. Afterwards,  GIF-180 gastroscope was advanced through oropharynx, esophagus, stomach  into the duodenum. Normal-looking duodenal bulb and second portion. Scope was withdrawn. Normal-looking antrum. Retroflex exam showed  portal hypertensive gastropathy. No gastric varices. Scope was  withdrawn. The patient was having one isolated varix and I felt it met  one column and there was also some scar tissue from previous banding. Scope was withdrawn. The 6-band shooter was attached to it and in the  lower esophagus, around 39 to 40 cm from outside; in a standard fashion,  a band was put on the varix. The patient tolerated the procedure well. IMPRESSION:  Esophageal varix, post banding. RECOMMENDATIONS:  We will have another EGD in two months.   The patient's  heart rate was mostly around 65 to 70 during the procedure.   I am  increasing the nadolol to 80 mg, and we will also request a consult with  Dr. Huong Gonzales at [facility] M[facility]    D: 04/20/2021 14:13:37       T: 04/20/2021 15:05:42     [email or web address]  Job#: [id number]     Doc#: [id number]    CC:  Reuben Colindres no chest pain, no cough, and no shortness of breath.

## 2021-04-30 NOTE — OP NOTE
[address] [id number]                                OPERATIVE REPORT    PATIENT NAME: Dorota Vaughan                     :        1955  MED REC NO:   [id number]                           ROOM:  ACCOUNT NO:   [de-identified]                           ADMIT DATE: 2021  PROVIDER:     NOLAN Ch Hash OF PROCEDURE:  2021    ADDENDUM    BLOOD LOSS:  None.         Barrett Schwartz M.D.    D: 2021 15:52:28       T: 2021 22:20:07     [email or web address]  Job#: [id number]     Doc#: [id number]    CC:

## 2021-06-17 NOTE — PROGRESS NOTES
Talked with Pt about Egd procedure 6/22/21. Pt fully Vaccinated. Pt will have wife Bernadine Henry with him.

## 2021-06-22 ENCOUNTER — HOSPITAL ENCOUNTER (OUTPATIENT)
Age: 66
Setting detail: OUTPATIENT SURGERY
Discharge: HOME | End: 2021-06-22
Attending: INTERNAL MEDICINE | Admitting: INTERNAL MEDICINE
Payer: MEDICARE

## 2021-06-22 ENCOUNTER — ANESTHESIA EVENT (OUTPATIENT)
Dept: ENDOSCOPY | Age: 66
End: 2021-06-22
Payer: MEDICARE

## 2021-06-22 ENCOUNTER — ANESTHESIA (OUTPATIENT)
Dept: ENDOSCOPY | Age: 66
End: 2021-06-22
Payer: MEDICARE

## 2021-06-22 VITALS
RESPIRATION RATE: 16 BRPM | WEIGHT: 216.4 LBS | TEMPERATURE: 97.9 F | BODY MASS INDEX: 30.3 KG/M2 | HEIGHT: 71 IN | OXYGEN SATURATION: 95 % | DIASTOLIC BLOOD PRESSURE: 70 MMHG | SYSTOLIC BLOOD PRESSURE: 110 MMHG | HEART RATE: 82 BPM

## 2021-06-22 VITALS
RESPIRATION RATE: 19 BRPM | OXYGEN SATURATION: 92 % | DIASTOLIC BLOOD PRESSURE: 74 MMHG | SYSTOLIC BLOOD PRESSURE: 120 MMHG

## 2021-06-22 PROCEDURE — 7100000010 HC PHASE II RECOVERY - FIRST 15 MIN: Performed by: INTERNAL MEDICINE

## 2021-06-22 PROCEDURE — 6360000002 HC RX W HCPCS: Performed by: REGISTERED NURSE

## 2021-06-22 PROCEDURE — 3700000000 HC ANESTHESIA ATTENDED CARE: Performed by: INTERNAL MEDICINE

## 2021-06-22 PROCEDURE — 3609012400 HC EGD TRANSORAL BIOPSY SINGLE/MULTIPLE: Performed by: INTERNAL MEDICINE

## 2021-06-22 PROCEDURE — 2580000003 HC RX 258: Performed by: INTERNAL MEDICINE

## 2021-06-22 PROCEDURE — 2720000010 HC SURG SUPPLY STERILE: Performed by: INTERNAL MEDICINE

## 2021-06-22 PROCEDURE — 88305 TISSUE EXAM BY PATHOLOGIST: CPT

## 2021-06-22 RX ORDER — SODIUM CHLORIDE 450 MG/100ML
INJECTION, SOLUTION INTRAVENOUS CONTINUOUS
Status: DISCONTINUED | OUTPATIENT
Start: 2021-06-22 | End: 2021-06-22 | Stop reason: HOSPADM

## 2021-06-22 RX ORDER — DULAGLUTIDE 1.5 MG/.5ML
1.5 INJECTION, SOLUTION SUBCUTANEOUS WEEKLY
COMMUNITY
End: 2022-07-07

## 2021-06-22 RX ORDER — PROPOFOL 10 MG/ML
INJECTION, EMULSION INTRAVENOUS PRN
Status: DISCONTINUED | OUTPATIENT
Start: 2021-06-22 | End: 2021-06-22 | Stop reason: SDUPTHER

## 2021-06-22 RX ADMIN — PROPOFOL 70 MG: 10 INJECTION, EMULSION INTRAVENOUS at 13:00

## 2021-06-22 RX ADMIN — PROPOFOL 50 MG: 10 INJECTION, EMULSION INTRAVENOUS at 13:06

## 2021-06-22 RX ADMIN — PROPOFOL 30 MG: 10 INJECTION, EMULSION INTRAVENOUS at 13:02

## 2021-06-22 RX ADMIN — PROPOFOL 50 MG: 10 INJECTION, EMULSION INTRAVENOUS at 13:04

## 2021-06-22 RX ADMIN — SODIUM CHLORIDE: 4.5 INJECTION, SOLUTION INTRAVENOUS at 12:51

## 2021-06-22 ASSESSMENT — PAIN SCALES - GENERAL
PAINLEVEL_OUTOF10: 0
PAINLEVEL_OUTOF10: 0

## 2021-06-22 NOTE — OP NOTE
[address] [place] [address]                                OPERATIVE REPORT    PATIENT NAME: Britney Esqueda                     :        1955  MED REC NO:   [id number]                           ROOM:  ACCOUNT NO:   [de-identified]                           ADMIT DATE: 2021  PROVIDER:     Irving Flood M.D.    [facility] OF PROCEDURE:  2021    SURGEON:  Irving Flood MD    PROCEDURE:  EGD plus biopsy. INDICATION FOR PROCEDURE:  The patient with a history of cirrhosis,  relative bleeds x2. The patient had Band-Aid probably four times and  the patient is here for a followup on varices. No other significant  change in health. The patient had been on nadolol and dose has been  increased to 60 mg. The patient had brought all the readings from home  and his heart rate mostly is now around 70-75 with pulse previously  lower than that and blood pressure readings are pretty stable as well. No other significant change in health. See the preop note for rest of  clinicals. ASA CLASSIFICATION:  III. MEDICATIONS:  Per Anesthesia. BIOPSY:  Yes. PHOTOGRAPHS:  Yes. DESCRIPTION OF THE PROCEDURE:  Informed consent was obtained after  explaining risks and benefits of the procedure and conscious sedation. Possible complications including bleeding, perforation, reaction to  medicine, but not limiting to death, were discussed. Afterwards, the GIF-180 gastroscope was advanced through oropharynx,  esophagus, stomach into the duodenum. Normal-looking duodenal bulb and  second portion. Scope was withdrawn. The patient was developing  nodular GAVE, but was at a very early stage. Retroflex exam showed  portal hypertensive gastropathy. Biopsies were taken. There was no  gastric varix. Scope was withdrawn. Esophageal varices has all been  obligated with multiple banding. I do see scar tissue.   I see one  column of stage I

## 2021-06-22 NOTE — ANESTHESIA PRE PROCEDURE
Department of Anesthesiology  Preprocedure Note       Name:  Pete Guzman   Age:  77 y.o.  :  1955                                          MRN:  [id number]         Date:  2021      Surgeon: Damion Blum):  Tony Ceja MD    Procedure: Procedure(s):  EGD BANDING    Medications prior to admission:   Prior to Admission medications    Medication Sig Start Date End Date Taking?  Authorizing Provider   Dulaglutide (TRULICITY) 1.5 BQ/6.8VP SOPN Inject 1.5 mg into the skin once a week   Yes Historical Provider, MD   fluticasone (VERAMYST) 27.5 MCG/SPRAY nasal spray 2 sprays by Each Nostril route daily   Yes Historical Provider, MD   nadolol (CORGARD) 20 MG tablet Take 1 tablet by mouth daily  Patient taking differently: Take 60 mg by mouth daily  3/27/21  Yes Pk Alan MD   pantoprazole (PROTONIX) 40 MG tablet Take 1 tablet by mouth 2 times daily 3/27/21  Yes Pk Alan MD   dapagliflozin (FARXIGA) 10 MG tablet Take 10 mg by mouth nightly   Yes Historical Provider, MD   atorvastatin (LIPITOR) 10 MG tablet Take 10 mg by mouth nightly   Yes Historical Provider, MD   metFORMIN (GLUCOPHAGE) 1000 MG tablet Take 1,000 mg by mouth 2 times daily (with meals)   Yes Historical Provider, MD   allopurinol (ZYLOPRIM) 300 MG tablet Take 300 mg by mouth daily   Yes Historical Provider, MD   montelukast (SINGULAIR) 10 MG tablet Take 10 mg by mouth nightly   Yes Historical Provider, MD   Misc Natural Products (OSTEO BI-FLEX ADV JOINT SHIELD PO) Take by mouth daily   Yes Historical Provider, MD   Multiple Vitamins-Minerals (MULTIVITAMIN ADULT PO) Take 1 tablet by mouth daily   Yes Historical Provider, MD   cetirizine (ZYRTEC) 10 MG tablet Take 10 mg by mouth daily    Historical Provider, MD       Current medications:    Current Facility-Administered Medications   Medication Dose Route Frequency Provider Last Rate Last Admin    0.45 % sodium chloride infusion   Intravenous Continuous Tony Ceja MD 75 mL/hr at 06/22/21 1251 New Bag at 06/22/21 1251       Allergies:     Allergies   Allergen Reactions    Sulfa Antibiotics      Unsure of reaction mother told him since he was young        Problem List:    Patient Active Problem List   Diagnosis Code    Hereditary hemochromatosis ([facility] E83.110    Calculus of gallbladder with chronic cholecystitis without obstruction K80.10    Abnormal biliary HIDA scan R94.8    GI hemorrhage K92.2    Rectal bleed K62.5    Melena K92.1    Upper GI bleed K92.2    Secondary esophageal varices with bleeding ([facility] I85.11       Past Medical History:        Diagnosis Date    Arthritis     Diabetes ([facility]     Esophageal varices (HCC)     GERD (gastroesophageal reflux disease)     Gout     History of blood transfusion     Hyperlipidemia     Hypertension     [facility] cirrhosis ([facility]     Seasonal allergies     Sleep apnea        Past Surgical History:        Procedure Laterality Date    BACK SURGERY      CERVICAL FUSION  2004    Dr Angelito Cruz    COLONOSCOPY  04/2015    Dr Aleah Stone, COLON, [address] ENDOSCOPY  11/2017    Dr Benoit Porras 8/11/2020    EGD CONTROL HEMORRHAGE performed by Winnie Huang MD at [facility]    UPPER GASTROINTESTINAL ENDOSCOPY Left 9/8/2020    EGD BANDING performed by Michael Golden MD at [facility] ENDOSCOPY Left 11/17/2020    EGD BAND LIGATION performed by Michael Golden MD at [address] [place] [address] N/A 3/26/2021    EGD BIOPSY performed by Anat Alberts MD at [facility]  3/26/2021    EGD BAND LIGATION performed by Anat Alberts MD at [address] [place] [address] N/A 4/20/2021    EGD BAND LIGATION performed by Michael Golden MD at [facility] End[facility]       Social History:    Social History     Tobacco Use    Smoking status: Former Smoker    Smokeless tobacco: Former User   Substance Use Topics    Alcohol use: Not Currently     Comment: daily-2 beers                                Counseling given: Not Answered      Vital Signs (Current):   Vitals:    06/22/21 1227   BP: 128/82   Pulse: 81   Resp: 18   Temp: 36.6 °C (97.9 °F)   TempSrc: Temporal   SpO2: 96%   Weight: 216 lb 6.4 oz (98.2 kg)   Height: 5' 11\" (1.803 m)                                              BP Readings from Last 3 Encounters:   06/22/21 128/82   04/20/21 127/72   04/20/21 123/68       NPO Status: Time of last liquid consumption: 0800                        Time of last solid consumption: 2030                        Date of last liquid consumption: 06/22/21                        Date of last solid food consumption: 06/21/21    BMI:   Wt Readings from Last 3 Encounters:   06/22/21 216 lb 6.4 oz (98.2 kg)   03/26/21 209 lb 11.2 oz (95.1 kg)   11/17/20 220 lb 12.8 oz (100.2 kg)     Body mass index is 30.18 kg/m². CBC:   Lab Results   Component Value Date    WBC 3.8 03/31/2021    RBC 4.74 03/31/2021    HGB 11.3 03/31/2021    HCT 39.1 03/31/2021    MCV 82.5 03/31/2021    RDW 14.6 08/11/2020     03/31/2021       CMP:   Lab Results   Component Value Date     03/24/2021    K 4.0 03/24/2021     03/24/2021    CO2 22 03/24/2021    BUN 13 03/24/2021    CREATININE 0.7 03/24/2021    LABGLOM >90 03/24/2021    GLUCOSE 189 03/24/2021    PROT 6.5 03/24/2021    CALCIUM 8.8 03/24/2021    BILITOT 0.5 03/24/2021    ALKPHOS 91 03/24/2021    AST 38 03/24/2021    ALT 36 03/24/2021       POC Tests: No results for input(s): POCGLU, POCNA, POCK, POCCL, POCBUN, POCHEMO, POCHCT in the last 72 hours.     Coags:   Lab Results   Component Value Date    INR 1.21 03/24/2021    APTT 30.7 03/24/2021       HCG (If Applicable): No results found for: PREGTESTUR, PREGSERUM, HCG, HCGQUANT     ABGs: No results found for: PHART, PO2ART, IEM7JPP, MAB1INZ, BEART, H0HAVBOE     Type &

## 2021-06-22 NOTE — PROGRESS NOTES
Hermila Burgess in phase 2 from EGD with . Family/ at bedside with pt.  discussed findings with pt. Discharge insturctions discussed with pt. Discharge papers signed and given to pt. Pt discharged home/facility.

## 2021-06-22 NOTE — H&P
[address] [address]  Sedation/Analgesia History & Physical    Patient: Jesenia Shock : 1955  Med Rec#: [id number] Acc#: [id number]   Provider Performing Procedure: Lynda Arnold MD  Primary Care Physician: Derek Jimenez    PRE-PROCEDURE   Full CODE [x]Yes  DNR-CCA/DNR-CC []Yes   Brief History/Pre-Procedure Diagnosis:varices          MEDICAL HISTORY  []CAD/Valve  []Liver Disease  []Lung Disease []Diabetes  []Hypertension []Renal Disease  []Additional information:       has a past medical history of Arthritis, Diabetes ([facility] Esophageal varices ([facility] GERD (gastroesophageal reflux disease), Gout, History of blood transfusion, Hyperlipidemia, Hypertension, Liver cirrhosis ([facility] Seasonal allergies, and Sleep apnea. SURGICAL HISTORY   has a past surgical history that includes Colonoscopy (2015); cervical fusion (); Upper gastrointestinal endoscopy (2017); Upper gastrointestinal endoscopy (N/A, 2020); Endoscopy, colon, diagnostic; back surgery; Esophageal varice ligation; Upper gastrointestinal endoscopy (Left, 2020); Upper gastrointestinal endoscopy (Left, 2020); Upper gastrointestinal endoscopy (N/A, 3/26/2021); Upper gastrointestinal endoscopy (3/26/2021); and Upper gastrointestinal endoscopy (N/A, 2021). Additional information:       ALLERGIES   Allergies as of 2021 - Review Complete 2021   Allergen Reaction Noted    Sulfa antibiotics  11/10/2017     Additional information:       MEDICATIONS   Coumadin Use Last 7 Days [x]No []Yes  Antiplatelet drug therapy use last 7 days  [x]No []Yes  Other anticoagulant use last 7 days  [x]No []Yes    Current Facility-Administered Medications:     0.45 % sodium chloride infusion, , Intravenous, Continuous, Lynda Arnold MD, Last Rate: 75 mL/hr at 21 1251, New Bag at 21 1251  Prior to Admission medications    Medication Sig Start Date End Date Taking?  Authorizing Provider   Dulaglutide (TRULICITY) 1.5 MG/0.5ML SOPN Inject 1.5 mg into the skin once a week   Yes Historical Provider, MD   fluticasone (VERAMYST) 27.5 MCG/SPRAY nasal spray 2 sprays by Each Nostril route daily   Yes Historical Provider, MD   nadolol (CORGARD) 20 MG tablet Take 1 tablet by mouth daily  Patient taking differently: Take 60 mg by mouth daily  3/27/21  Yes Norma Dunlap MD   pantoprazole (PROTONIX) 40 MG tablet Take 1 tablet by mouth 2 times daily 3/27/21  Yes Norma Dunlap MD   dapagliflozin (FARXIGA) 10 MG tablet Take 10 mg by mouth nightly   Yes Historical Provider, MD   atorvastatin (LIPITOR) 10 MG tablet Take 10 mg by mouth nightly   Yes Historical Provider, MD   metFORMIN (GLUCOPHAGE) 1000 MG tablet Take 1,000 mg by mouth 2 times daily (with meals)   Yes Historical Provider, MD   allopurinol (ZYLOPRIM) 300 MG tablet Take 300 mg by mouth daily   Yes Historical Provider, MD   montelukast (SINGULAIR) 10 MG tablet Take 10 mg by mouth nightly   Yes Historical Provider, MD   Misc Natural Products (OSTEO BI-FLEX ADV JOINT SHIELD PO) Take by mouth daily   Yes Historical Provider, MD   Multiple Vitamins-Minerals (MULTIVITAMIN ADULT PO) Take 1 tablet by mouth daily   Yes Historical Provider, MD   cetirizine (ZYRTEC) 10 MG tablet Take 10 mg by mouth daily    Historical Provider, MD     Additional information:       PHYSICAL:   Heart:  [x]Regular rate and rhythm  []Other:    Lungs:  [x]Clear    []Other:    Abdomen: [x]Soft    []Other:    Mental Status: [x]Alert & Oriented  []Other:      VITAL SIGNS   Patient Vitals for the past 24 hrs:   BP Temp Temp src Pulse Resp SpO2 Height Weight   06/22/21 1227 128/82 97.9 °F (36.6 °C) Temporal 81 18 96 % 5' 11\" (1.803 m) 216 lb 6.4 oz (98.2 kg)       PLANNED PROCEDURE  [x]EGD  []Colonoscopy []Flex Sigmoid  []ERCP []EUS   []Cystoscopy  [] CATH [] BRONCH   Consent: I have discussed with the patient and/or the patient representative the indication, alternatives, and the possible risks and/or complications of the planned procedure and the anesthesia methods. The patient and/or patient representative appear to understand and agree to proceed. SEDATION  Planned agent:[]Midazolam []Meperidine []Sublimaze []Morphine  []Diazepam [x]Propofol  []Other:     ASA Classification: Class 3 - A patient with severe systemic disease that limits activity but is not incapacitating    Airway Assessment: normal    Monitoring and Safety: The patient will be placed on a cardiac monitor and vital signs, pulse oximetry and level of consciousness will be continuously evaluated throughout the procedure. The patient will be closely monitored until recovery from the medications is complete and the patient has returned to baseline status. Respiratory therapy will be on standby during the procedure. [x]Pre-procedure diagnostic studies complete and results available. Comment:    [x]Previous sedation/anesthesia experiences assessed. Comment:    [x]The patient is an appropriate candidate to undergo the planned procedure sedation and anesthesia. (Refer to nursing sedation/analgesia documentation record)  [x]Formulation and discussion of sedation/procedure plan, risks, and expectations with patient and/or responsible adult completed. [x]Patient examined immediately prior to the procedure.  (Refer to nursing sedation/analgesia documentation record)    Saint Chasten, MD, MD   Electronically signed 6/22/2021 at 1:01 PM

## 2021-06-22 NOTE — POST SEDATION
[address] [address]  Sedation/Analgesia Post Sedation Record    Patient: Lindsay Hall: 1955  Med Rec#: [id number] Acc#: [id number]   Procedure Performed By: Rosa Lopez MD  Primary Care Physician: Aurora Judge    POST-PROCEDURE    Physicians/Assistants: Rosa Lopez MD  Procedure Performed:    Sedation/Anesthesia:     Estimated Blood Loss:          ml  Specimens Removed:  []None [x]Other:      Disposition of Specimen:  [x]Pathology []Other      Complications:   [x]None Immediate []Other:     Post-procedure Diagnosis/Findings:             Recommendations:     cirrhosis          Rosa Lopez MD, MD [facility]  Electronically signed 6/22/2021 at 1:12 PM
Orthopedic

## 2021-06-22 NOTE — ANESTHESIA POSTPROCEDURE EVALUATION
Department of Anesthesiology  Postprocedure Note    Patient: Jarad Ly  MRN: [id number]  YOB: 1955  Date of evaluation: 6/22/2021  Time:  1:10 PM     Procedure Summary     Date: 06/22/21 Room / Location: [address] [place] [address] / [address] [place] [address]    Anesthesia Start: 3946 Anesthesia Stop: 0299    Procedure: EGD BIOPSY (Left Esophagus) Diagnosis: (ESOPHAGEAL VARICES)    Surgeons: Anne Marie Almeida MD Responsible Provider: Ellis Goel DO    Anesthesia Type: MAC ASA Status: 2          Anesthesia Type: MAC    Uche Phase I:      Uche Phase II:      Last vitals: Reviewed and per EMR flowsheets.        Anesthesia Post Evaluation    Patient location during evaluation: PACU  Patient participation: complete - patient participated  Level of consciousness: awake  Pain score: 0  Airway patency: patent  Nausea & Vomiting: no vomiting and no nausea  Complications: no  Cardiovascular status: hemodynamically stable  Respiratory status: spontaneous ventilation and room air  Hydration status: stable

## 2021-07-05 NOTE — OP NOTE
[address] [place] [address]                                OPERATIVE REPORT    PATIENT NAME: Emilie Hickman                     :        1955  MED REC NO:   [id number]                           ROOM:  ACCOUNT NO:   [de-identified]                           ADMIT DATE: 2021  PROVIDER:     NOLAN Canas Jeanie OF PROCEDURE:  2021    ADDENDUM    ESTIMATED BLOOD LOSS:  None.         Adair Ham M.D.    D: 2021 12:03:58       T: 2021 16:34:04     [email or web address]  Job#: [id number]     Doc#: [id number]    CC:

## 2021-08-03 ENCOUNTER — HOSPITAL ENCOUNTER (OUTPATIENT)
Age: 66
Discharge: HOME | End: 2021-08-03
Payer: MEDICARE

## 2021-08-03 LAB
ALBUMIN SERPL-MCNC: 4.6 G/DL (ref 3.5–5.1)
ALP BLD-CCNC: 95 U/L (ref 38–126)
ALT SERPL-CCNC: 36 U/L (ref 11–66)
ANION GAP SERPL CALCULATED.3IONS-SCNC: 16 MEQ/L (ref 8–16)
AST SERPL-CCNC: 39 U/L (ref 5–40)
BILIRUB SERPL-MCNC: 0.8 MG/DL (ref 0.3–1.2)
BILIRUBIN DIRECT: < 0.2 MG/DL (ref 0–0.3)
BUN BLDV-MCNC: 11 MG/DL (ref 7–22)
CHLORIDE BLD-SCNC: 103 MEQ/L (ref 98–111)
CO2: 21 MEQ/L (ref 23–33)
CREAT SERPL-MCNC: 0.7 MG/DL (ref 0.4–1.2)
ERYTHROCYTE [DISTWIDTH] IN BLOOD BY AUTOMATED COUNT: 19 % (ref 11.5–14.5)
ERYTHROCYTE [DISTWIDTH] IN BLOOD BY AUTOMATED COUNT: 56.4 FL (ref 35–45)
FERRITIN: 12 NG/ML (ref 22–322)
GFR SERPL CREATININE-BSD FRML MDRD: > 90 ML/MIN/1.73M2
HCT VFR BLD CALC: 39.9 % (ref 42–52)
HEMOGLOBIN: 11.6 GM/DL (ref 14–18)
INR BLD: 1.13 (ref 0.85–1.13)
IRON: 38 UG/DL (ref 65–195)
MCH RBC QN AUTO: 23.9 PG (ref 26–33)
MCHC RBC AUTO-ENTMCNC: 29.1 GM/DL (ref 32.2–35.5)
MCV RBC AUTO: 82.1 FL (ref 80–94)
PLATELET # BLD: 100 THOU/MM3 (ref 130–400)
PMV BLD AUTO: ABNORMAL FL (ref 9.4–12.4)
POTASSIUM SERPL-SCNC: 4 MEQ/L (ref 3.5–5.2)
RBC # BLD: 4.86 MILL/MM3 (ref 4.7–6.1)
SODIUM BLD-SCNC: 140 MEQ/L (ref 135–145)
TOTAL IRON BINDING CAPACITY: 381 UG/DL (ref 171–450)
TOTAL PROTEIN: 7.1 G/DL (ref 6.1–8)
WBC # BLD: 3.2 THOU/MM3 (ref 4.8–10.8)

## 2021-08-03 PROCEDURE — 85027 COMPLETE CBC AUTOMATED: CPT

## 2021-08-03 PROCEDURE — 80076 HEPATIC FUNCTION PANEL: CPT

## 2021-08-03 PROCEDURE — 82565 ASSAY OF CREATININE: CPT

## 2021-08-03 PROCEDURE — 36415 COLL VENOUS BLD VENIPUNCTURE: CPT

## 2021-08-03 PROCEDURE — 83540 ASSAY OF IRON: CPT

## 2021-08-03 PROCEDURE — 85610 PROTHROMBIN TIME: CPT

## 2021-08-03 PROCEDURE — 82728 ASSAY OF FERRITIN: CPT

## 2021-08-03 PROCEDURE — 84466 ASSAY OF TRANSFERRIN: CPT

## 2021-08-03 PROCEDURE — 84520 ASSAY OF UREA NITROGEN: CPT

## 2021-08-03 PROCEDURE — 80051 ELECTROLYTE PANEL: CPT

## 2021-08-03 PROCEDURE — 82105 ALPHA-FETOPROTEIN SERUM: CPT

## 2021-08-04 LAB
AFP-TUMOR MARKER: 3.3 UG/L
TRANSFERRIN: 329 MG/DL (ref 200–360)

## 2021-08-06 ENCOUNTER — HOSPITAL ENCOUNTER (OUTPATIENT)
Dept: NON INVASIVE DIAGNOSTICS | Age: 66
Discharge: HOME | End: 2021-08-06
Payer: MEDICARE

## 2021-08-06 LAB
LV EF: 50 %
LVEF MODALITY: NORMAL

## 2021-08-06 PROCEDURE — 93306 TTE W/DOPPLER COMPLETE: CPT

## 2021-09-16 ENCOUNTER — HOSPITAL ENCOUNTER (OUTPATIENT)
Dept: ULTRASOUND IMAGING | Age: 66
Discharge: HOME | End: 2021-09-16
Payer: MEDICARE

## 2021-09-16 DIAGNOSIS — K74.60 HEPATIC CIRRHOSIS, UNSPECIFIED HEPATIC CIRRHOSIS TYPE, UNSPECIFIED WHETHER ASCITES PRESENT (HCC): ICD-10-CM

## 2021-09-16 DIAGNOSIS — K75.81 NONALCOHOLIC STEATOHEPATITIS: ICD-10-CM

## 2021-09-16 PROCEDURE — 76705 ECHO EXAM OF ABDOMEN: CPT

## 2022-03-10 LAB
AVERAGE GLUCOSE: NORMAL
HBA1C MFR BLD: 8.4 %

## 2022-03-29 ENCOUNTER — HOSPITAL ENCOUNTER (OUTPATIENT)
Age: 67
Discharge: HOME | End: 2022-03-29
Payer: MEDICARE

## 2022-03-29 LAB
ALBUMIN SERPL-MCNC: 4.4 G/DL (ref 3.5–5.1)
ALP BLD-CCNC: 95 U/L (ref 38–126)
ALT SERPL-CCNC: 31 U/L (ref 11–66)
ANION GAP SERPL CALCULATED.3IONS-SCNC: 14 MEQ/L (ref 8–16)
AST SERPL-CCNC: 34 U/L (ref 5–40)
BILIRUB SERPL-MCNC: 0.8 MG/DL (ref 0.3–1.2)
BILIRUBIN DIRECT: < 0.2 MG/DL (ref 0–0.3)
BUN BLDV-MCNC: 15 MG/DL (ref 7–22)
CHLORIDE BLD-SCNC: 102 MEQ/L (ref 98–111)
CO2: 23 MEQ/L (ref 23–33)
CREAT SERPL-MCNC: 0.9 MG/DL (ref 0.4–1.2)
ERYTHROCYTE [DISTWIDTH] IN BLOOD BY AUTOMATED COUNT: 17.4 % (ref 11.5–14.5)
ERYTHROCYTE [DISTWIDTH] IN BLOOD BY AUTOMATED COUNT: 53.9 FL (ref 35–45)
GFR SERPL CREATININE-BSD FRML MDRD: 84 ML/MIN/1.73M2
HCT VFR BLD CALC: 43.9 % (ref 42–52)
HEMOGLOBIN: 13.4 GM/DL (ref 14–18)
INR BLD: 1.11 (ref 0.85–1.13)
MCH RBC QN AUTO: 26.1 PG (ref 26–33)
MCHC RBC AUTO-ENTMCNC: 30.5 GM/DL (ref 32.2–35.5)
MCV RBC AUTO: 85.6 FL (ref 80–94)
PLATELET # BLD: 89 THOU/MM3 (ref 130–400)
PMV BLD AUTO: 12.1 FL (ref 9.4–12.4)
POTASSIUM SERPL-SCNC: 4.2 MEQ/L (ref 3.5–5.2)
RBC # BLD: 5.13 MILL/MM3 (ref 4.7–6.1)
SCAN OF BLOOD SMEAR: NORMAL
SODIUM BLD-SCNC: 139 MEQ/L (ref 135–145)
TOTAL PROTEIN: 7.3 G/DL (ref 6.1–8)
WBC # BLD: 4.2 THOU/MM3 (ref 4.8–10.8)

## 2022-03-29 PROCEDURE — 85027 COMPLETE CBC AUTOMATED: CPT

## 2022-03-29 PROCEDURE — 80051 ELECTROLYTE PANEL: CPT

## 2022-03-29 PROCEDURE — 80076 HEPATIC FUNCTION PANEL: CPT

## 2022-03-29 PROCEDURE — 82565 ASSAY OF CREATININE: CPT

## 2022-03-29 PROCEDURE — 84520 ASSAY OF UREA NITROGEN: CPT

## 2022-03-29 PROCEDURE — 85610 PROTHROMBIN TIME: CPT

## 2022-03-29 PROCEDURE — 36415 COLL VENOUS BLD VENIPUNCTURE: CPT

## 2022-03-29 PROCEDURE — 82105 ALPHA-FETOPROTEIN SERUM: CPT

## 2022-03-30 LAB — AFP-TUMOR MARKER: 3.8 UG/L

## 2022-04-11 ENCOUNTER — HOSPITAL ENCOUNTER (OUTPATIENT)
Dept: ULTRASOUND IMAGING | Age: 67
Discharge: HOME | End: 2022-04-11
Payer: MEDICARE

## 2022-04-11 DIAGNOSIS — K74.60 HEPATIC CIRRHOSIS, UNSPECIFIED HEPATIC CIRRHOSIS TYPE, UNSPECIFIED WHETHER ASCITES PRESENT (HCC): ICD-10-CM

## 2022-04-11 PROCEDURE — 76705 ECHO EXAM OF ABDOMEN: CPT

## 2022-06-29 ENCOUNTER — HOSPITAL ENCOUNTER (OUTPATIENT)
Age: 67
Discharge: HOME | End: 2022-06-29
Payer: MEDICARE

## 2022-06-29 LAB
ALBUMIN SERPL-MCNC: 4.4 G/DL (ref 3.5–5.1)
ALP BLD-CCNC: 102 U/L (ref 38–126)
ALT SERPL-CCNC: 28 U/L (ref 11–66)
ANION GAP SERPL CALCULATED.3IONS-SCNC: 15 MEQ/L (ref 8–16)
AST SERPL-CCNC: 32 U/L (ref 5–40)
AVERAGE GLUCOSE: 186 MG/DL (ref 70–126)
BILIRUB SERPL-MCNC: 0.7 MG/DL (ref 0.3–1.2)
BUN BLDV-MCNC: 15 MG/DL (ref 7–22)
CALCIUM SERPL-MCNC: 9.4 MG/DL (ref 8.5–10.5)
CHLORIDE BLD-SCNC: 103 MEQ/L (ref 98–111)
CHOLESTEROL, TOTAL: 184 MG/DL (ref 100–199)
CO2: 20 MEQ/L (ref 23–33)
CREAT SERPL-MCNC: 0.7 MG/DL (ref 0.4–1.2)
ERYTHROCYTE [DISTWIDTH] IN BLOOD BY AUTOMATED COUNT: 16.8 % (ref 11.5–14.5)
ERYTHROCYTE [DISTWIDTH] IN BLOOD BY AUTOMATED COUNT: 52.6 FL (ref 35–45)
FERRITIN: 18 NG/ML (ref 22–322)
GFR SERPL CREATININE-BSD FRML MDRD: > 90 ML/MIN/1.73M2
GLUCOSE BLD-MCNC: 190 MG/DL (ref 70–108)
HBA1C MFR BLD: 8.2 % (ref 4.4–6.4)
HCT VFR BLD CALC: 43.6 % (ref 42–52)
HDLC SERPL-MCNC: 43 MG/DL
HEMOGLOBIN: 13.6 GM/DL (ref 14–18)
LDL CHOLESTEROL CALCULATED: 105 MG/DL
MCH RBC QN AUTO: 27 PG (ref 26–33)
MCHC RBC AUTO-ENTMCNC: 31.2 GM/DL (ref 32.2–35.5)
MCV RBC AUTO: 86.7 FL (ref 80–94)
PLATELET # BLD: 81 THOU/MM3 (ref 130–400)
PMV BLD AUTO: 12.5 FL (ref 9.4–12.4)
POTASSIUM SERPL-SCNC: 4 MEQ/L (ref 3.5–5.2)
RBC # BLD: 5.03 MILL/MM3 (ref 4.7–6.1)
SODIUM BLD-SCNC: 138 MEQ/L (ref 135–145)
TOTAL PROTEIN: 7.1 G/DL (ref 6.1–8)
TRIGL SERPL-MCNC: 180 MG/DL (ref 0–199)
WBC # BLD: 3.5 THOU/MM3 (ref 4.8–10.8)

## 2022-06-29 PROCEDURE — 85027 COMPLETE CBC AUTOMATED: CPT

## 2022-06-29 PROCEDURE — 82728 ASSAY OF FERRITIN: CPT

## 2022-06-29 PROCEDURE — 80053 COMPREHEN METABOLIC PANEL: CPT

## 2022-06-29 PROCEDURE — 80061 LIPID PANEL: CPT

## 2022-06-29 PROCEDURE — 83036 HEMOGLOBIN GLYCOSYLATED A1C: CPT

## 2022-06-29 PROCEDURE — 36415 COLL VENOUS BLD VENIPUNCTURE: CPT

## 2022-07-07 ENCOUNTER — OFFICE VISIT (OUTPATIENT)
Dept: FAMILY MEDICINE CLINIC | Age: 67
End: 2022-07-07
Payer: MEDICARE

## 2022-07-07 VITALS
DIASTOLIC BLOOD PRESSURE: 72 MMHG | BODY MASS INDEX: 31 KG/M2 | OXYGEN SATURATION: 99 % | WEIGHT: 221.4 LBS | HEIGHT: 71 IN | HEART RATE: 72 BPM | SYSTOLIC BLOOD PRESSURE: 120 MMHG

## 2022-07-07 DIAGNOSIS — L82.0 SEBORRHEIC KERATOSES, INFLAMED: ICD-10-CM

## 2022-07-07 DIAGNOSIS — E78.2 MIXED HYPERLIPIDEMIA: ICD-10-CM

## 2022-07-07 DIAGNOSIS — E11.9 TYPE 2 DIABETES MELLITUS WITHOUT COMPLICATION, WITHOUT LONG-TERM CURRENT USE OF INSULIN (HCC): Primary | ICD-10-CM

## 2022-07-07 DIAGNOSIS — I10 PRIMARY HYPERTENSION: ICD-10-CM

## 2022-07-07 DIAGNOSIS — Z12.5 ENCOUNTER FOR SCREENING FOR MALIGNANT NEOPLASM OF PROSTATE: ICD-10-CM

## 2022-07-07 DIAGNOSIS — M18.12 PRIMARY OSTEOARTHRITIS OF FIRST CARPOMETACARPAL JOINT OF LEFT HAND: ICD-10-CM

## 2022-07-07 DIAGNOSIS — J30.1 SEASONAL ALLERGIC RHINITIS DUE TO POLLEN: ICD-10-CM

## 2022-07-07 PROBLEM — K62.5 RECTAL BLEED: Status: RESOLVED | Noted: 2020-08-20 | Resolved: 2022-07-07

## 2022-07-07 PROCEDURE — 1123F ACP DISCUSS/DSCN MKR DOCD: CPT | Performed by: FAMILY MEDICINE

## 2022-07-07 PROCEDURE — 99214 OFFICE O/P EST MOD 30 MIN: CPT | Performed by: FAMILY MEDICINE

## 2022-07-07 PROCEDURE — 3052F HG A1C>EQUAL 8.0%<EQUAL 9.0%: CPT | Performed by: FAMILY MEDICINE

## 2022-07-07 RX ORDER — PRAMIPEXOLE DIHYDROCHLORIDE 0.25 MG/1
0.25 TABLET ORAL
COMMUNITY

## 2022-07-07 RX ORDER — DULAGLUTIDE 3 MG/.5ML
INJECTION, SOLUTION SUBCUTANEOUS
COMMUNITY
Start: 2022-04-21 | End: 2022-07-07

## 2022-07-07 RX ORDER — ATORVASTATIN CALCIUM 10 MG/1
10 TABLET, FILM COATED ORAL DAILY
COMMUNITY
End: 2022-07-07

## 2022-07-07 RX ORDER — CARVEDILOL 12.5 MG/1
TABLET ORAL
COMMUNITY
Start: 2022-04-17

## 2022-07-07 RX ORDER — FLUTICASONE PROPIONATE 50 MCG
2 SPRAY, SUSPENSION (ML) NASAL DAILY
COMMUNITY

## 2022-07-07 SDOH — ECONOMIC STABILITY: FOOD INSECURITY: WITHIN THE PAST 12 MONTHS, THE FOOD YOU BOUGHT JUST DIDN'T LAST AND YOU DIDN'T HAVE MONEY TO GET MORE.: NEVER TRUE

## 2022-07-07 SDOH — ECONOMIC STABILITY: FOOD INSECURITY: WITHIN THE PAST 12 MONTHS, YOU WORRIED THAT YOUR FOOD WOULD RUN OUT BEFORE YOU GOT MONEY TO BUY MORE.: NEVER TRUE

## 2022-07-07 ASSESSMENT — PATIENT HEALTH QUESTIONNAIRE - PHQ9
2. FEELING DOWN, DEPRESSED OR HOPELESS: 0
SUM OF ALL RESPONSES TO PHQ QUESTIONS 1-9: 0
SUM OF ALL RESPONSES TO PHQ QUESTIONS 1-9: 0
1. LITTLE INTEREST OR PLEASURE IN DOING THINGS: 0
SUM OF ALL RESPONSES TO PHQ QUESTIONS 1-9: 0
SUM OF ALL RESPONSES TO PHQ QUESTIONS 1-9: 0
SUM OF ALL RESPONSES TO PHQ9 QUESTIONS 1 & 2: 0

## 2022-07-07 ASSESSMENT — ENCOUNTER SYMPTOMS
TROUBLE SWALLOWING: 0
COUGH: 0
DIARRHEA: 0
SHORTNESS OF BREATH: 0
VOMITING: 0
CONSTIPATION: 0
SORE THROAT: 0
ABDOMINAL PAIN: 0

## 2022-07-07 ASSESSMENT — SOCIAL DETERMINANTS OF HEALTH (SDOH): HOW HARD IS IT FOR YOU TO PAY FOR THE VERY BASICS LIKE FOOD, HOUSING, MEDICAL CARE, AND HEATING?: NOT HARD AT ALL

## 2022-07-07 NOTE — PROGRESS NOTES
[address] [phone]  Dept: [phone]     JOAQUÍN Salcido is a 74 y.o.male    Pt presents for follow up medications. Pt c/o dizziness. Pt c/o pain in L thumb. Pt c/o wart on R thigh.     Pt feeling ok since last visit- interval history and any new issues noted below:       Patient Active Problem List   Diagnosis    [facility]red[facility]ochromatosis ([facility]    Calculus of gallbladder with chronic cholecystitis without obstruction    Abnormal biliary HIDA scan    [facility] with [facility]    Type 2 diabetes mellitus without complication, without long-term current use of insulin ([facility]    Primary hypertension    Seasonal allergic rhinitis due to pollen    Mixed hyperlipidemia    Encounter for screening for malignant neoplasm of prostate        Current Outpatient Medications   Medication Sig Dispense Refill    pramipexole (MIRAPEX) 0.25 MG tablet Take 0.25 mg by mouth      fluticasone (FLONASE) 50 MCG/ACT nasal spray 2 sprays by Nasal route daily      carvedilol (COREG) 12.5 MG tablet TAKE 1 TABLET BY MOUTH TWICE DAILY      Peppermint Oil 90 MG CPCR Take 1 capsule by mouth 2 times daily      Dulaglutide 3 MG/0.5ML SOPN Inject 3 mg into the skin every 7 days      fluticasone (VERAMYST) 27.5 MCG/SPRAY nasal spray 2 sprays by Each Nostril route daily      pantoprazole (PROTONIX) 40 MG tablet Take 1 tablet by mouth 2 times daily 60 tablet 3    dapagliflozin (FARXIGA) 10 MG tablet Take 10 mg by mouth nightly      atorvastatin (LIPITOR) 10 MG tablet Take 10 mg by mouth nightly      metFORMIN (GLUCOPHAGE) 1000 MG tablet Take 1,000 mg by mouth 2 times daily (with meals)      allopurinol (ZYLOPRIM) 300 MG tablet Take 300 mg by mouth daily      montelukast (SINGULAIR) 10 MG tablet Take 10 mg by mouth nightly      cetirizine (ZYRTEC) 10 MG tablet Take 10 mg by mouth daily      [facility] Natural Products (OSTEO BI-FLEX ADV JOINT SHIELD PO) Take by mouth daily      Multiple Vitamins-Minerals (MULTIVITAMIN ADULT PO) Take 1 tablet by mouth daily       No current facility-administered medications for this visit. Review of Systems   Constitutional: Negative for appetite change, fatigue, fever and unexpected weight change. HENT: Negative for congestion, ear pain, sore throat and trouble swallowing. Eyes: Negative for visual disturbance. Respiratory: Negative for cough and shortness of breath. Cardiovascular: Negative for chest pain, palpitations and leg swelling. Gastrointestinal: Negative for abdominal pain, constipation, diarrhea and vomiting. Genitourinary: Negative for dysuria and frequency. Musculoskeletal: Negative for arthralgias and myalgias. Skin: Negative for rash. Neurological: Negative for dizziness. OBJECTIVE     /72 (Site: Right Upper Arm, Position: Sitting, Cuff Size: Large Adult)   Pulse 72   Ht 5' 11\" (1.803 m)   Wt 221 lb 6.4 oz (100.4 kg)   SpO2 99%   BMI 30.88 kg/m²   Body mass index is 30.88 kg/m². BP Readings from Last 3 Encounters:   07/07/22 120/72   06/22/21 110/70   06/22/21 120/74         Physical Exam  Vitals and nursing note reviewed. Constitutional:       General: He is not in acute distress. Appearance: Normal appearance. He is normal weight. He is not ill-appearing. HENT:      Head: Normocephalic and atraumatic. Right Ear: Tympanic membrane, ear canal and external ear normal.      Left Ear: Tympanic membrane, ear canal and external ear normal.      Nose: Nose normal.      Mouth/Throat:      Mouth: Mucous membranes are moist.      Pharynx: Oropharynx is clear. Eyes:      Extraocular Movements: Extraocular movements intact. Conjunctiva/sclera: Conjunctivae normal.      Pupils: Pupils are equal, round, and reactive to light. Cardiovascular:      Rate and Rhythm: Normal rate and regular rhythm. Pulses: Normal pulses.       Heart sounds: No murmur heard. Pulmonary:      Effort: Pulmonary effort is normal. No respiratory distress. Breath sounds: Normal breath sounds. No wheezing. Abdominal:      General: Abdomen is flat. Bowel sounds are normal. There is no distension. Palpations: Abdomen is soft. There is no mass. Tenderness: There is no abdominal tenderness. There is no guarding or rebound. Musculoskeletal:         General: Tenderness (Patient has tenderness over the first carpometacarpal joint in the right hand) present. No swelling. Normal range of motion. Cervical back: Normal range of motion. No tenderness. Right lower leg: No edema. Left lower leg: No edema. Lymphadenopathy:      Cervical: No cervical adenopathy. Skin:     General: Skin is warm. Findings: No rash. Comments: Patient has a seborrheic keratosis on his right thigh right worse shorts and about a centimeter in diameter and slightly inflamed   Neurological:      General: No focal deficit present. Mental Status: He is alert. Psychiatric:         Mood and Affect: Mood normal.         No results found for this visit on 07/07/22.     Lab Results   Component Value Date    LABA1C 8.2 (H) 06/29/2022       Lab Results   Component Value Date    CHOL 184 06/29/2022    TRIG 180 06/29/2022    HDL 43 06/29/2022    LDLCALC 105 06/29/2022       The 10-year ASCVD risk score (Jeralene Brunner., et al., 2013) is: 26.2%    Values used to calculate the score:      Age: 74 years      Sex: Male      Is Non- : No      Diabetic: Yes      Tobacco smoker: No      Systolic Blood Pressure: 567 mmHg      Is BP treated: No      HDL Cholesterol: 43 mg/dL      Total Cholesterol: 184 mg/dL    Lab Results   Component Value Date     06/29/2022    K 4.0 06/29/2022     06/29/2022    CO2 20 (L) 06/29/2022    BUN 15 06/29/2022    CREATININE 0.7 06/29/2022    GLUCOSE 190 (H) 06/29/2022    CALCIUM 9.4 06/29/2022    PROT 7.1 06/29/2022    LABALBU 4.4 06/29/2022    BILITOT 0.7 06/29/2022    ALKPHOS 102 06/29/2022    AST 32 06/29/2022    ALT 28 06/29/2022    LABGLOM >90 06/29/2022     estimated creatinine clearance is 122 mL/min (based on SCr of 0.7 mg/dL).      No results found for: Mary Shank    No results found for: TSH, Z6WKBZS, Z9PPLKJ, THYROIDAB, FT3, T4FREE    Lab Results   Component Value Date    WBC 3.5 (L) 06/29/2022    HGB 13.6 (L) 06/29/2022    HCT 43.6 06/29/2022    MCV 86.7 06/29/2022    PLT 81 (L) 06/29/2022       No results found for: PSA, PSADIA    Immunization History   Administered Date(s) Administered    COVID-19, PFIZER GRAY top, DO NOT Dilute, (age 15 y+), IM, 30 mcg/0.3 mL 04/09/2022    COVID-19, PFIZER PURPLE top, DILUTE for use, (age 15 y+), 30mcg/0.3mL 02/12/2021, 10/11/2021    Influenza Vaccine, unspecified formulation 10/03/2014, 10/02/2015, 10/14/2016    Influenza Whole 11/05/2007, 10/20/2008, 10/07/2009    Influenza, Quadv, IM, PF (6 mo and older Fluzone, Flulaval, Fluarix, and 3 yrs and older Afluria) 11/02/2017, 11/15/2018, 11/12/2019    Pneumococcal Conjugate 13-valent Rosa Vazquez) 11/15/2018       Health Maintenance   Topic Date Due    Annual Wellness Visit (AWV)  Never done    Hepatitis A vaccine (1 of 2 - Risk 2-dose series) Never done    Diabetic foot exam  Never done    Depression Screen  Never done    Diabetic microalbuminuria test  Never done    Diabetic retinal exam  Never done    Hepatitis C screen  Never done    Hepatitis B vaccine (1 of 3 - Risk 3-dose series) Never done    DTaP/Tdap/Td vaccine (1 - Tdap) Never done    Prostate Specific Antigen (PSA) Screening or Monitoring  Never done    Shingles vaccine (1 of 2) Never done    Pneumococcal 65+ years Vaccine (2 - PPSV23 or PCV20) 11/15/2019    Colorectal Cancer Screen  03/24/2022    Flu vaccine (1) 09/01/2022    A1C test (Diabetic or Prediabetic)  06/29/2023    Lipids  06/29/2023    COVID-19 Vaccine  Completed    AAA screen  Completed    Hib vaccine  Aged Out    Meningococcal (ACWY) vaccine  Aged Out       Future Appointments   Date Time Provider Campos Walls   10/11/2022  1:00 PM Isabel Washington MD [address] [facility] [address] [facility]         ASSESSMENT       Diagnosis Orders   1. Type 2 diabetes mellitus without complication, without long-term current use of insulin (HCC)  CBC with Auto Differential    Comprehensive Metabolic Panel, Fasting    Hemoglobin A1C    Lipid, Fasting    Microalbumin / Creatinine Urine Ratio    Vitamin B12    TSH with Reflex    PSA Screening   2. Primary hypertension     3. Mixed hyperlipidemia     4. Seasonal allergic rhinitis due to pollen     5. Encounter for screening for malignant neoplasm of prostate   PSA Screening   6. Seborrheic keratoses, inflamed     7. Primary osteoarthritis of first carpometacarpal joint of left hand         PLAN      1. Type 2 diabetes mellitus without complication, without long-term current use of insulin (HCC)  A1c poorly controlled but him and his wife just started a new diet continue meds unchanged recheck A1c 3 months  - CBC with Auto Differential; Future  - Comprehensive Metabolic Panel, Fasting; Future  - Hemoglobin A1C; Future  - Lipid, Fasting; Future  - Microalbumin / Creatinine Urine Ratio; Future  - Vitamin B12; Future  - TSH with Reflex; Future  - PSA Screening; Future    2. Primary hypertension  Pressure perfectly at goal no change occasionally lightheadedness which discussed better hydration    3. Mixed hyperlipidemia  Off meds due to his liver issues follow    4. Seasonal allergic rhinitis due to pollen  Well-controlled no change    5. Encounter for screening for malignant neoplasm of prostate   PSA ordered  - PSA Screening; Future    6. Seborrheic keratoses, inflamed  Seborrheic keratosis is inflamed causing discomfort we will arrange to remove it    7.  Primary osteoarthritis of first carpometacarpal joint of left hand  Patient cannot take much Tylenol and

## 2022-08-06 PROBLEM — Z12.5 ENCOUNTER FOR SCREENING FOR MALIGNANT NEOPLASM OF PROSTATE: Status: RESOLVED | Noted: 2022-07-07 | Resolved: 2022-08-06

## 2022-10-04 ENCOUNTER — HOSPITAL ENCOUNTER (OUTPATIENT)
Age: 67
Discharge: HOME | End: 2022-10-04
Payer: MEDICARE

## 2022-10-04 DIAGNOSIS — E11.9 TYPE 2 DIABETES MELLITUS WITHOUT COMPLICATION, WITHOUT LONG-TERM CURRENT USE OF INSULIN (HCC): ICD-10-CM

## 2022-10-04 DIAGNOSIS — Z12.5 ENCOUNTER FOR SCREENING FOR MALIGNANT NEOPLASM OF PROSTATE: ICD-10-CM

## 2022-10-04 LAB
ALBUMIN SERPL-MCNC: 4 G/DL (ref 3.5–5.1)
ALP BLD-CCNC: 87 U/L (ref 38–126)
ALT SERPL-CCNC: 30 U/L (ref 11–66)
ANION GAP SERPL CALCULATED.3IONS-SCNC: 15 MEQ/L (ref 8–16)
AST SERPL-CCNC: 34 U/L (ref 5–40)
AVERAGE GLUCOSE: 201 MG/DL (ref 70–126)
BASOPHILS # BLD: 1.2 %
BASOPHILS ABSOLUTE: 0.1 THOU/MM3 (ref 0–0.1)
BILIRUB SERPL-MCNC: 0.7 MG/DL (ref 0.3–1.2)
BUN BLDV-MCNC: 14 MG/DL (ref 7–22)
CALCIUM SERPL-MCNC: 9.8 MG/DL (ref 8.5–10.5)
CHLORIDE BLD-SCNC: 106 MEQ/L (ref 98–111)
CHOLESTEROL, FASTING: 194 MG/DL (ref 100–199)
CO2: 22 MEQ/L (ref 23–33)
CREAT SERPL-MCNC: 0.7 MG/DL (ref 0.4–1.2)
CREATININE, URINE: 50.3 MG/DL
EOSINOPHIL # BLD: 3.7 %
EOSINOPHILS ABSOLUTE: 0.2 THOU/MM3 (ref 0–0.4)
ERYTHROCYTE [DISTWIDTH] IN BLOOD BY AUTOMATED COUNT: 16.7 % (ref 11.5–14.5)
ERYTHROCYTE [DISTWIDTH] IN BLOOD BY AUTOMATED COUNT: 53.4 FL (ref 35–45)
GFR SERPL CREATININE-BSD FRML MDRD: > 90 ML/MIN/1.73M2
GLUCOSE FASTING: 214 MG/DL (ref 70–108)
HBA1C MFR BLD: 8.7 % (ref 4.4–6.4)
HCT VFR BLD CALC: 44.7 % (ref 42–52)
HDLC SERPL-MCNC: 52 MG/DL
HEMOGLOBIN: 14.5 GM/DL (ref 14–18)
IMMATURE GRANS (ABS): 0.01 THOU/MM3 (ref 0–0.07)
IMMATURE GRANULOCYTES: 0.2 %
LDL CHOLESTEROL CALCULATED: 117 MG/DL
LYMPHOCYTES # BLD: 18.6 %
LYMPHOCYTES ABSOLUTE: 0.8 THOU/MM3 (ref 1–4.8)
MCH RBC QN AUTO: 28.4 PG (ref 26–33)
MCHC RBC AUTO-ENTMCNC: 32.4 GM/DL (ref 32.2–35.5)
MCV RBC AUTO: 87.5 FL (ref 80–94)
MICROALBUMIN UR-MCNC: < 1.2 MG/DL
MICROALBUMIN/CREAT UR-RTO: 24 MG/G (ref 0–30)
MONOCYTES # BLD: 10 %
MONOCYTES ABSOLUTE: 0.4 THOU/MM3 (ref 0.4–1.3)
NUCLEATED RED BLOOD CELLS: 0 /100 WBC
PLATELET # BLD: 81 THOU/MM3 (ref 130–400)
PMV BLD AUTO: 12.4 FL (ref 9.4–12.4)
POTASSIUM SERPL-SCNC: 4.2 MEQ/L (ref 3.5–5.2)
PROSTATE SPECIFIC ANTIGEN: 0.24 NG/ML (ref 0–1)
RBC # BLD: 5.11 MILL/MM3 (ref 4.7–6.1)
SEG NEUTROPHILS: 66.3 %
SEGMENTED NEUTROPHILS ABSOLUTE COUNT: 2.9 THOU/MM3 (ref 1.8–7.7)
SODIUM BLD-SCNC: 143 MEQ/L (ref 135–145)
TOTAL PROTEIN: 7 G/DL (ref 6.1–8)
TRIGLYCERIDE, FASTING: 125 MG/DL (ref 0–199)
TSH SERPL DL<=0.05 MIU/L-ACNC: 3.48 UIU/ML (ref 0.4–4.2)
VITAMIN B-12: 1012 PG/ML (ref 211–911)
WBC # BLD: 4.3 THOU/MM3 (ref 4.8–10.8)

## 2022-10-04 PROCEDURE — 80061 LIPID PANEL: CPT

## 2022-10-04 PROCEDURE — 85025 COMPLETE CBC W/AUTO DIFF WBC: CPT

## 2022-10-04 PROCEDURE — 80053 COMPREHEN METABOLIC PANEL: CPT

## 2022-10-04 PROCEDURE — G0103 PSA SCREENING: HCPCS

## 2022-10-04 PROCEDURE — 83036 HEMOGLOBIN GLYCOSYLATED A1C: CPT

## 2022-10-04 PROCEDURE — 84443 ASSAY THYROID STIM HORMONE: CPT

## 2022-10-04 PROCEDURE — 82607 VITAMIN B-12: CPT

## 2022-10-04 PROCEDURE — 36415 COLL VENOUS BLD VENIPUNCTURE: CPT

## 2022-10-04 PROCEDURE — 82043 UR ALBUMIN QUANTITATIVE: CPT

## 2022-10-13 ENCOUNTER — OFFICE VISIT (OUTPATIENT)
Dept: FAMILY MEDICINE CLINIC | Age: 67
End: 2022-10-13
Payer: MEDICARE

## 2022-10-13 VITALS
HEART RATE: 93 BPM | DIASTOLIC BLOOD PRESSURE: 82 MMHG | SYSTOLIC BLOOD PRESSURE: 132 MMHG | OXYGEN SATURATION: 98 % | WEIGHT: 224.2 LBS | BODY MASS INDEX: 31.27 KG/M2

## 2022-10-13 DIAGNOSIS — E83.110 HEREDITARY HEMOCHROMATOSIS (HCC): ICD-10-CM

## 2022-10-13 DIAGNOSIS — J30.1 SEASONAL ALLERGIC RHINITIS DUE TO POLLEN: ICD-10-CM

## 2022-10-13 DIAGNOSIS — I10 PRIMARY HYPERTENSION: ICD-10-CM

## 2022-10-13 DIAGNOSIS — E78.2 MIXED HYPERLIPIDEMIA: ICD-10-CM

## 2022-10-13 DIAGNOSIS — E11.9 TYPE 2 DIABETES MELLITUS WITHOUT COMPLICATION, WITHOUT LONG-TERM CURRENT USE OF INSULIN (HCC): Primary | ICD-10-CM

## 2022-10-13 PROBLEM — R94.8 ABNORMAL BILIARY HIDA SCAN: Status: RESOLVED | Noted: 2017-12-04 | Resolved: 2022-10-13

## 2022-10-13 PROBLEM — K80.10 CALCULUS OF GALLBLADDER WITH CHRONIC CHOLECYSTITIS WITHOUT OBSTRUCTION: Status: RESOLVED | Noted: 2017-12-04 | Resolved: 2022-10-13

## 2022-10-13 PROCEDURE — 99214 OFFICE O/P EST MOD 30 MIN: CPT | Performed by: FAMILY MEDICINE

## 2022-10-13 PROCEDURE — 1123F ACP DISCUSS/DSCN MKR DOCD: CPT | Performed by: FAMILY MEDICINE

## 2022-10-13 PROCEDURE — 3052F HG A1C>EQUAL 8.0%<EQUAL 9.0%: CPT | Performed by: FAMILY MEDICINE

## 2022-10-13 ASSESSMENT — ENCOUNTER SYMPTOMS
DIARRHEA: 0
VOMITING: 0
COUGH: 0
CONSTIPATION: 0
NAUSEA: 0
SHORTNESS OF BREATH: 0

## 2022-10-13 NOTE — PROGRESS NOTES
[address] [facility]  [address]  [address] [facility] [address]  Dept: [phone]     JOAQUÍN Harmon is a 74 y.o.male    Pt presents for follow up of     Pt feeling ok since last visit- interval history and any new issues noted below:       Patient Active Problem List   Diagnosis    Hereditary hemochromatosis ([facility] [address]    Calculus of gallbladder with chronic cholecystitis without obstruction    Abnormal biliary HIDA scan    Secondary esophageal varices with bleeding ([facility]    Type 2 diabetes mellitus without complication, without long-term current use of insulin ([facility]    Primary hypertension    Seasonal allergic rhinitis due to pollen    Mixed hyperlipidemia       Current Outpatient Medications   Medication Sig Dispense Refill    metFORMIN (GLUCOPHAGE) 1000 MG tablet Take 1 tablet by mouth 2 times daily (with meals) 180 tablet 3    pramipexole (MIRAPEX) 0.25 MG tablet Take 0.25 mg by mouth      fluticasone (FLONASE) 50 MCG/ACT nasal spray 2 sprays by Nasal route daily      carvedilol (COREG) 12.5 MG tablet TAKE 1 TABLET BY MOUTH TWICE DAILY      Peppermint Oil 90 MG CPCR Take 1 capsule by mouth 2 times daily      Dulaglutide 3 MG/0.5ML SOPN Inject 3 mg into the skin every 7 days      fluticasone (VERAMYST) 27.5 MCG/SPRAY nasal spray 2 sprays by Each Nostril route daily      pantoprazole (PROTONIX) 40 MG tablet Take 1 tablet by mouth 2 times daily 60 tablet 3    dapagliflozin (FARXIGA) 10 MG tablet Take 10 mg by mouth nightly      atorvastatin (LIPITOR) 10 MG tablet Take 10 mg by mouth nightly      allopurinol (ZYLOPRIM) 300 MG tablet Take 300 mg by mouth daily      montelukast (SINGULAIR) 10 MG tablet Take 10 mg by mouth nightly      cetirizine (ZYRTEC) 10 MG tablet Take 10 mg by mouth daily      Misc Natural Products (OSTEO BI-FLEX ADV JOINT SHIELD PO) Take by mouth daily      Multiple Vitamins-Minerals (MULTIVITAMIN ADULT PO) Take 1 tablet by mouth daily       No current facility-administered medications for this visit. Review of Systems   Constitutional:  Negative for activity change and fever. HENT:  Negative for congestion and ear pain. Respiratory:  Negative for cough and shortness of breath. Cardiovascular:  Negative for chest pain and leg swelling. Gastrointestinal:  Negative for constipation, diarrhea, nausea and vomiting. Genitourinary:  Negative for dysuria and urgency. Skin:  Negative for rash. Neurological:  Negative for dizziness, weakness and headaches. OBJECTIVE     /82 (Site: Right Upper Arm, Position: Sitting, Cuff Size: Medium Adult)   Pulse 93   Wt 224 lb 3.2 oz (101.7 kg)   SpO2 98%   BMI 31.27 kg/m²   Body mass index is 31.27 kg/m². BP Readings from Last 3 Encounters:   10/13/22 132/82   07/07/22 120/72   06/22/21 110/70         Physical Exam  Constitutional:       General: He is not in acute distress. Appearance: Normal appearance. He is not ill-appearing. HENT:      Head: Normocephalic. Right Ear: Tympanic membrane normal.      Left Ear: Tympanic membrane normal.      Nose: Nose normal.      Mouth/Throat:      Pharynx: Oropharynx is clear. Eyes:      General:         Right eye: No discharge. Left eye: No discharge. Extraocular Movements: Extraocular movements intact. Pupils: Pupils are equal, round, and reactive to light. Cardiovascular:      Rate and Rhythm: Normal rate and regular rhythm. Pulses: Normal pulses. Heart sounds: Normal heart sounds. No murmur heard. Pulmonary:      Effort: Pulmonary effort is normal. No respiratory distress. Breath sounds: Normal breath sounds. Abdominal:      General: Abdomen is flat. Bowel sounds are normal.      Palpations: Abdomen is soft. Musculoskeletal:         General: Normal range of motion. Skin:     General: Skin is warm and dry. Findings: No rash. Neurological:      General: No focal deficit present.       Mental Status: He is alert and oriented to person, place, and time. Mental status is at baseline. Psychiatric:         Mood and Affect: Mood normal.         Behavior: Behavior normal.         Thought Content: Thought content normal.         Judgment: Judgment normal.       No results found for this visit on 10/13/22. Lab Results   Component Value Date    LABA1C 8.7 (H) 10/04/2022       Lab Results   Component Value Date    CHOL 184 06/29/2022    TRIG 180 06/29/2022    HDL 52 10/04/2022    LDLCALC 117 10/04/2022       The 10-year ASCVD risk score (Zuleyka BRIGGS, et al., 2019) is: 32.7%    Values used to calculate the score:      Age: 74 years      Sex: Male      Is Non- : No      Diabetic: Yes      Tobacco smoker: No      Systolic Blood Pressure: 783 mmHg      Is BP treated: Yes      HDL Cholesterol: 52 mg/dL      Total Cholesterol: 194 mg/dl    Lab Results   Component Value Date     10/04/2022    K 4.2 10/04/2022     10/04/2022    CO2 22 (L) 10/04/2022    BUN 14 10/04/2022    CREATININE 0.7 10/04/2022    GLUCOSE 190 (H) 06/29/2022    CALCIUM 9.8 10/04/2022    PROT 7.0 10/04/2022    LABALBU 4.0 10/04/2022    BILITOT 0.7 10/04/2022    ALKPHOS 87 10/04/2022    AST 34 10/04/2022    ALT 30 10/04/2022    LABGLOM >90 10/04/2022     estimated creatinine clearance is 123 mL/min (based on SCr of 0.7 mg/dL).      Lab Results   Component Value Date    LABMICR < 1.20 10/04/2022       Lab Results   Component Value Date    TSH 3.480 10/04/2022       Lab Results   Component Value Date    WBC 4.3 (L) 10/04/2022    HGB 14.5 10/04/2022    HCT 44.7 10/04/2022    MCV 87.5 10/04/2022    PLT 81 (L) 10/04/2022       Lab Results   Component Value Date    PSA 0.24 10/04/2022       Immunization History   Administered Date(s) Administered    COVID-19, PFIZER GRAY top, DO NOT Dilute, (age 15 y+), IM, 30 mcg/0.3 mL 04/09/2022    COVID-19, PFIZER PURPLE top, DILUTE for use, (age 15 y+), 30mcg/0.3mL 02/12/2021, 10/11/2021 Influenza Vaccine, unspecified formulation 10/03/2014, 10/02/2015, 10/14/2016    Influenza Whole 11/05/2007, 10/20/2008, 10/07/2009    Influenza, FLUARIX, FLULAVAL, FLUZONE (age 10 mo+) AND AFLURIA, (age 1 y+), PF, 0.5mL 11/02/2017, 11/15/2018, 11/12/2019    Pneumococcal Conjugate 13-valent (Thierrylogancynthia Gulling) 11/15/2018       Health Maintenance   Topic Date Due    Hepatitis A vaccine (1 of 2 - Risk 2-dose series) Never done    Diabetic foot exam  Never done    Diabetic retinal exam  Never done    Hepatitis C screen  Never done    Hepatitis B vaccine (1 of 3 - Risk 3-dose series) Never done    DTaP/Tdap/Td vaccine (1 - Tdap) Never done    Shingles vaccine (1 of 2) Never done    Pneumococcal 65+ years Vaccine (2 - PPSV23 or PCV20) 11/15/2019    Annual Wellness Visit (AWV)  Never done    Colorectal Cancer Screen  03/24/2022    Flu vaccine (1) 08/01/2022    COVID-19 Vaccine (4 - Booster for Pfizer series) 08/09/2022    Depression Screen  07/07/2023    A1C test (Diabetic or Prediabetic)  10/04/2023    Diabetic microalbuminuria test  10/04/2023    Lipids  10/04/2023    AAA screen  Completed    Hib vaccine  Aged Out    Meningococcal (ACWY) vaccine  Aged Out       Future Appointments   Date Time Provider Campos Walls   1/12/2023 11:00 AM Mateus Murillo MD [address] [facility] [address]         ASSESSMENT       Diagnosis Orders   1. Type 2 diabetes mellitus without complication, without long-term current use of insulin (HCC)  Comprehensive Metabolic Panel, Fasting    Hemoglobin A1C      2. Primary hypertension  Comprehensive Metabolic Panel, Fasting    Hemoglobin A1C      3. Mixed hyperlipidemia        4. Seasonal allergic rhinitis due to pollen        5. Hereditary hemochromatosis ([facility]  Ferritin          PLAN      1. Type 2 diabetes mellitus without complication, without long-term current use of insulin (HCC)  Chronic unstable  A1c poorly controlled. Patient's diet has been pretty poor lately with farming.   He wants to make more changes before he add more meds so continue current treatment check in 3 months we will probably need to consider insulin  - Comprehensive Metabolic Panel, Fasting; Future  - Hemoglobin A1C; Future    2. Primary hypertension  Blood pressure perfectly at goal continue his Coreg renal function electrolytes checked and normal  - Comprehensive Metabolic Panel, Fasting; Future  - Hemoglobin A1C; Future    3. HLD  Patient tolerating Lipitor well    4. Seasonal allergic rhinitis due to pollen  Excellent control with Flonase and over-the-counter Zyrtec no change    5. Hereditary hemochromatosis ([facility] [address]  Ferritin ordered for follow-up visit  - Ferritin; Future           Preventive Health Topics:  Pt declines HIV and HEP C screening at this time due to lack of risk factors. Encouraged COVID VACCINE  Encouraged FLU VACCINE after October 1st annually. Encouraged TETANUS SHOT (TdaP/ ADACEL/ [address]) per personal pharmacy. Encouraged PREVNAR 20 at this time  Encouraged SHINGLES SHOT [facility]) - check with insurance re coverage and location of administration.         Kiesha Thomas MD  2:55 PM  10/13/22

## 2022-11-11 ENCOUNTER — HOSPITAL ENCOUNTER (OUTPATIENT)
Age: 67
Discharge: HOME | End: 2022-11-11
Payer: MEDICARE

## 2022-11-11 DIAGNOSIS — R10.11 RUQ PAIN: ICD-10-CM

## 2022-11-11 LAB
ALBUMIN SERPL-MCNC: 4.2 G/DL (ref 3.5–5.1)
ALP BLD-CCNC: 88 U/L (ref 38–126)
ALT SERPL-CCNC: 30 U/L (ref 11–66)
ANION GAP SERPL CALCULATED.3IONS-SCNC: 14 MEQ/L (ref 8–16)
AST SERPL-CCNC: 36 U/L (ref 5–40)
BILIRUB SERPL-MCNC: 1 MG/DL (ref 0.3–1.2)
BUN BLDV-MCNC: 13 MG/DL (ref 7–22)
CALCIUM SERPL-MCNC: 9.2 MG/DL (ref 8.5–10.5)
CHLORIDE BLD-SCNC: 104 MEQ/L (ref 98–111)
CO2: 20 MEQ/L (ref 23–33)
CREAT SERPL-MCNC: 0.9 MG/DL (ref 0.4–1.2)
GFR SERPL CREATININE-BSD FRML MDRD: > 60 ML/MIN/1.73M2
GLUCOSE FASTING: 238 MG/DL (ref 70–108)
LIPASE: 125.8 U/L (ref 5.6–51.3)
POTASSIUM SERPL-SCNC: 4.2 MEQ/L (ref 3.5–5.2)
SODIUM BLD-SCNC: 138 MEQ/L (ref 135–145)
TOTAL PROTEIN: 6.8 G/DL (ref 6.1–8)

## 2022-11-11 PROCEDURE — 36415 COLL VENOUS BLD VENIPUNCTURE: CPT

## 2022-11-11 PROCEDURE — 83690 ASSAY OF LIPASE: CPT

## 2022-11-11 PROCEDURE — 80053 COMPREHEN METABOLIC PANEL: CPT

## 2022-11-11 PROCEDURE — 85025 COMPLETE CBC W/AUTO DIFF WBC: CPT

## 2022-11-12 LAB
BASOPHILS # BLD: 1 %
BASOPHILS ABSOLUTE: 0 THOU/MM3 (ref 0–0.1)
EOSINOPHIL # BLD: 3.5 %
EOSINOPHILS ABSOLUTE: 0.1 THOU/MM3 (ref 0–0.4)
ERYTHROCYTE [DISTWIDTH] IN BLOOD BY AUTOMATED COUNT: 16.3 % (ref 11.5–14.5)
ERYTHROCYTE [DISTWIDTH] IN BLOOD BY AUTOMATED COUNT: 55.2 FL (ref 35–45)
HCT VFR BLD CALC: 44.3 % (ref 42–52)
HEMOGLOBIN: 14.2 GM/DL (ref 14–18)
IMMATURE GRANS (ABS): 0.01 THOU/MM3 (ref 0–0.07)
IMMATURE GRANULOCYTES: 0.3 %
LYMPHOCYTES # BLD: 22.4 %
LYMPHOCYTES ABSOLUTE: 0.9 THOU/MM3 (ref 1–4.8)
MCH RBC QN AUTO: 29.3 PG (ref 26–33)
MCHC RBC AUTO-ENTMCNC: 32.1 GM/DL (ref 32.2–35.5)
MCV RBC AUTO: 91.5 FL (ref 80–94)
MONOCYTES # BLD: 8.3 %
MONOCYTES ABSOLUTE: 0.3 THOU/MM3 (ref 0.4–1.3)
NUCLEATED RED BLOOD CELLS: 0 /100 WBC
PLATELET # BLD: 88 THOU/MM3 (ref 130–400)
PLATELET ESTIMATE: ABNORMAL
PMV BLD AUTO: 11.9 FL (ref 9.4–12.4)
RBC # BLD: 4.84 MILL/MM3 (ref 4.7–6.1)
SCAN OF BLOOD SMEAR: NORMAL
SEG NEUTROPHILS: 64.5 %
SEGMENTED NEUTROPHILS ABSOLUTE COUNT: 2.6 THOU/MM3 (ref 1.8–7.7)
WBC # BLD: 4 THOU/MM3 (ref 4.8–10.8)

## 2022-11-15 ENCOUNTER — HOSPITAL ENCOUNTER (OUTPATIENT)
Age: 67
Discharge: HOME | End: 2022-11-15
Payer: MEDICARE

## 2022-11-15 DIAGNOSIS — K85.00 IDIOPATHIC ACUTE PANCREATITIS WITHOUT INFECTION OR NECROSIS: ICD-10-CM

## 2022-11-15 LAB — LIPASE: 96.9 U/L (ref 5.6–51.3)

## 2022-11-15 PROCEDURE — 36415 COLL VENOUS BLD VENIPUNCTURE: CPT

## 2022-11-15 PROCEDURE — 83690 ASSAY OF LIPASE: CPT

## 2022-11-16 ENCOUNTER — HOSPITAL ENCOUNTER (OUTPATIENT)
Dept: ULTRASOUND IMAGING | Age: 67
Discharge: HOME | End: 2022-11-16
Payer: MEDICARE

## 2022-11-16 DIAGNOSIS — K85.00 IDIOPATHIC ACUTE PANCREATITIS WITHOUT INFECTION OR NECROSIS: ICD-10-CM

## 2022-11-16 DIAGNOSIS — R10.11 RUQ PAIN: ICD-10-CM

## 2022-11-16 PROCEDURE — 76705 ECHO EXAM OF ABDOMEN: CPT

## 2023-01-06 ENCOUNTER — HOSPITAL ENCOUNTER (OUTPATIENT)
Age: 68
Discharge: HOME | End: 2023-01-06
Payer: MEDICARE

## 2023-01-06 DIAGNOSIS — E83.110 HEREDITARY HEMOCHROMATOSIS (HCC): ICD-10-CM

## 2023-01-06 DIAGNOSIS — I10 PRIMARY HYPERTENSION: ICD-10-CM

## 2023-01-06 DIAGNOSIS — E11.9 TYPE 2 DIABETES MELLITUS WITHOUT COMPLICATION, WITHOUT LONG-TERM CURRENT USE OF INSULIN (HCC): ICD-10-CM

## 2023-01-06 LAB
ALBUMIN SERPL-MCNC: 4.4 G/DL (ref 3.5–5.1)
ALP BLD-CCNC: 101 U/L (ref 38–126)
ALT SERPL-CCNC: 30 U/L (ref 11–66)
ANION GAP SERPL CALCULATED.3IONS-SCNC: 16 MEQ/L (ref 8–16)
AST SERPL-CCNC: 36 U/L (ref 5–40)
AVERAGE GLUCOSE: 135 MG/DL (ref 70–126)
BILIRUB SERPL-MCNC: 0.8 MG/DL (ref 0.3–1.2)
BUN BLDV-MCNC: 16 MG/DL (ref 7–22)
CALCIUM SERPL-MCNC: 9.4 MG/DL (ref 8.5–10.5)
CHLORIDE BLD-SCNC: 105 MEQ/L (ref 98–111)
CO2: 21 MEQ/L (ref 23–33)
CREAT SERPL-MCNC: 0.8 MG/DL (ref 0.4–1.2)
FERRITIN: 27 NG/ML (ref 22–322)
GFR SERPL CREATININE-BSD FRML MDRD: > 60 ML/MIN/1.73M2
GLUCOSE FASTING: 128 MG/DL (ref 70–108)
HBA1C MFR BLD: 6.5 % (ref 4.4–6.4)
POTASSIUM SERPL-SCNC: 4.1 MEQ/L (ref 3.5–5.2)
SODIUM BLD-SCNC: 142 MEQ/L (ref 135–145)
TOTAL PROTEIN: 6.8 G/DL (ref 6.1–8)

## 2023-01-06 PROCEDURE — 80053 COMPREHEN METABOLIC PANEL: CPT

## 2023-01-06 PROCEDURE — 82728 ASSAY OF FERRITIN: CPT

## 2023-01-06 PROCEDURE — 36415 COLL VENOUS BLD VENIPUNCTURE: CPT

## 2023-01-06 PROCEDURE — 83036 HEMOGLOBIN GLYCOSYLATED A1C: CPT

## 2023-01-12 ENCOUNTER — OFFICE VISIT (OUTPATIENT)
Dept: FAMILY MEDICINE CLINIC | Age: 68
End: 2023-01-12
Payer: MEDICARE

## 2023-01-12 VITALS
OXYGEN SATURATION: 98 % | DIASTOLIC BLOOD PRESSURE: 78 MMHG | SYSTOLIC BLOOD PRESSURE: 116 MMHG | HEIGHT: 71 IN | BODY MASS INDEX: 29.6 KG/M2 | HEART RATE: 84 BPM | WEIGHT: 211.4 LBS

## 2023-01-12 DIAGNOSIS — E78.2 MIXED HYPERLIPIDEMIA: ICD-10-CM

## 2023-01-12 DIAGNOSIS — Z00.00 MEDICARE ANNUAL WELLNESS VISIT, SUBSEQUENT: Primary | ICD-10-CM

## 2023-01-12 DIAGNOSIS — R05.1 ACUTE COUGH: ICD-10-CM

## 2023-01-12 DIAGNOSIS — E11.9 TYPE 2 DIABETES MELLITUS WITHOUT COMPLICATION, WITHOUT LONG-TERM CURRENT USE OF INSULIN (HCC): ICD-10-CM

## 2023-01-12 DIAGNOSIS — I10 PRIMARY HYPERTENSION: ICD-10-CM

## 2023-01-12 DIAGNOSIS — E83.110 HEREDITARY HEMOCHROMATOSIS (HCC): ICD-10-CM

## 2023-01-12 LAB
INFLUENZA VIRUS A RNA: NEGATIVE
INFLUENZA VIRUS B RNA: NEGATIVE

## 2023-01-12 PROCEDURE — 87502 INFLUENZA DNA AMP PROBE: CPT | Performed by: FAMILY MEDICINE

## 2023-01-12 PROCEDURE — 1123F ACP DISCUSS/DSCN MKR DOCD: CPT | Performed by: FAMILY MEDICINE

## 2023-01-12 PROCEDURE — 3078F DIAST BP <80 MM HG: CPT | Performed by: FAMILY MEDICINE

## 2023-01-12 PROCEDURE — 3074F SYST BP LT 130 MM HG: CPT | Performed by: FAMILY MEDICINE

## 2023-01-12 PROCEDURE — G0439 PPPS, SUBSEQ VISIT: HCPCS | Performed by: FAMILY MEDICINE

## 2023-01-12 PROCEDURE — 3044F HG A1C LEVEL LT 7.0%: CPT | Performed by: FAMILY MEDICINE

## 2023-01-12 ASSESSMENT — PATIENT HEALTH QUESTIONNAIRE - PHQ9
SUM OF ALL RESPONSES TO PHQ QUESTIONS 1-9: 0
SUM OF ALL RESPONSES TO PHQ QUESTIONS 1-9: 0
SUM OF ALL RESPONSES TO PHQ9 QUESTIONS 1 & 2: 0
1. LITTLE INTEREST OR PLEASURE IN DOING THINGS: 0
SUM OF ALL RESPONSES TO PHQ QUESTIONS 1-9: 0
SUM OF ALL RESPONSES TO PHQ QUESTIONS 1-9: 0
2. FEELING DOWN, DEPRESSED OR HOPELESS: 0

## 2023-01-12 ASSESSMENT — LIFESTYLE VARIABLES
HOW OFTEN DO YOU HAVE A DRINK CONTAINING ALCOHOL: NEVER
HOW MANY STANDARD DRINKS CONTAINING ALCOHOL DO YOU HAVE ON A TYPICAL DAY: PATIENT DOES NOT DRINK

## 2023-01-12 ASSESSMENT — ENCOUNTER SYMPTOMS: COUGH: 1

## 2023-01-12 NOTE — PROGRESS NOTES
[address] [id number]  Dept: [phone]     JOAQUÍN Lazaro is a 69 y.o.male    Pt presents for follow up for Medicare wellness. Pt c/o cough and congestion for approx 1 wk.     Pt feeling ok since last visit- interval history and any new issues noted below:       Patient Active Problem List   Diagnosis    Hereditary hemochromatosis ([facility] [address]    Secondary esophageal varices with bleeding ([facility] [address]    Type 2 diabetes mellitus without complication, without long-term current use of insulin (HCC)    Primary hypertension    Seasonal allergic rhinitis due to pollen    Mixed hyperlipidemia       Current Outpatient Medications   Medication Sig Dispense Refill    metFORMIN (GLUCOPHAGE) 1000 MG tablet Take 1 tablet by mouth 2 times daily (with meals) 180 tablet 3    pramipexole (MIRAPEX) 0.25 MG tablet Take 0.25 mg by mouth      fluticasone (FLONASE) 50 MCG/ACT nasal spray 2 sprays by Nasal route daily      carvedilol (COREG) 12.5 MG tablet TAKE 1 TABLET BY MOUTH TWICE DAILY      Peppermint Oil 90 MG CPCR Take 1 capsule by mouth 2 times daily      Dulaglutide 3 MG/0.5ML SOPN Inject 3 mg into the skin every 7 days      fluticasone (VERAMYST) 27.5 MCG/SPRAY nasal spray 2 sprays by Each Nostril route daily      pantoprazole (PROTONIX) 40 MG tablet Take 1 tablet by mouth 2 times daily 60 tablet 3    dapagliflozin (FARXIGA) 10 MG tablet Take 10 mg by mouth nightly      atorvastatin (LIPITOR) 10 MG tablet Take 10 mg by mouth nightly      allopurinol (ZYLOPRIM) 300 MG tablet Take 300 mg by mouth daily      montelukast (SINGULAIR) 10 MG tablet Take 10 mg by mouth nightly      cetirizine (ZYRTEC) 10 MG tablet Take 10 mg by mouth daily      Misc Natural Products (OSTEO BI-FLEX ADV JOINT SHIELD PO) Take by mouth daily      Multiple Vitamins-Minerals (MULTIVITAMIN ADULT PO) Take 1 tablet by mouth daily       No current facility-administered medications for this visit. Review of Systems   HENT:  Positive for congestion. Respiratory:  Positive for cough. OBJECTIVE     /78 (Site: Left Upper Arm, Position: Sitting, Cuff Size: Large Adult)   Pulse 84   Ht 5' 11\" (1.803 m)   Wt 211 lb 6.4 oz (95.9 kg)   SpO2 98%   BMI 29.48 kg/m²   Body mass index is 29.48 kg/m². BP Readings from Last 3 Encounters:   01/12/23 116/78   10/13/22 132/82   07/07/22 120/72         Physical Exam  Vitals and nursing note reviewed. Constitutional:       General: He is not in acute distress. Appearance: Normal appearance. He is normal weight. He is not ill-appearing. HENT:      Head: Normocephalic and atraumatic. Right Ear: Tympanic membrane, ear canal and external ear normal.      Left Ear: Tympanic membrane, ear canal and external ear normal.      Nose: Nose normal.      Mouth/Throat:      Mouth: Mucous membranes are moist.      Pharynx: Oropharynx is clear. Eyes:      Extraocular Movements: Extraocular movements intact. Conjunctiva/sclera: Conjunctivae normal.      Pupils: Pupils are equal, round, and reactive to light. Cardiovascular:      Rate and Rhythm: Normal rate and regular rhythm. Pulses: Normal pulses. Heart sounds: No murmur heard. Pulmonary:      Effort: Pulmonary effort is normal. No respiratory distress. Breath sounds: Normal breath sounds. No wheezing. Abdominal:      General: Abdomen is flat. Bowel sounds are normal. There is no distension. Palpations: Abdomen is soft. There is no mass. Tenderness: There is no abdominal tenderness. There is no guarding or rebound. Musculoskeletal:         General: No swelling. Normal range of motion. Cervical back: Normal range of motion. No tenderness. Right lower leg: No edema. Left lower leg: No edema. Lymphadenopathy:      Cervical: No cervical adenopathy. Skin:     General: Skin is warm. Findings: No rash.    Neurological:      General: No focal deficit present. Mental Status: He is alert. Psychiatric:         Mood and Affect: Mood normal.       No results found for this visit on 01/12/23. Lab Results   Component Value Date    LABA1C 6.5 (H) 01/06/2023       Lab Results   Component Value Date    CHOL 184 06/29/2022    TRIG 180 06/29/2022    HDL 52 10/04/2022    LDLCALC 117 10/04/2022       The 10-year ASCVD risk score (Zuleyka BRIGGS, et al., 2019) is: 28.7%    Values used to calculate the score:      Age: 69 years      Sex: Male      Is Non- : No      Diabetic: Yes      Tobacco smoker: No      Systolic Blood Pressure: 366 mmHg      Is BP treated: Yes      HDL Cholesterol: 52 mg/dL      Total Cholesterol: 194 mg/dl    Lab Results   Component Value Date     01/06/2023    K 4.1 01/06/2023     01/06/2023    CO2 21 (L) 01/06/2023    BUN 16 01/06/2023    CREATININE 0.8 01/06/2023    GLUCOSE 190 (H) 06/29/2022    CALCIUM 9.4 01/06/2023    PROT 6.8 01/06/2023    LABALBU 4.4 01/06/2023    BILITOT 0.8 01/06/2023    ALKPHOS 101 01/06/2023    AST 36 01/06/2023    ALT 30 01/06/2023    LABGLOM >60 01/06/2023     estimated creatinine clearance is 103 mL/min (based on SCr of 0.8 mg/dL).      Lab Results   Component Value Date    LABMICR < 1.20 10/04/2022       Lab Results   Component Value Date    TSH 3.480 10/04/2022       Lab Results   Component Value Date    WBC 4.0 (L) 11/11/2022    HGB 14.2 11/11/2022    HCT 44.3 11/11/2022    MCV 91.5 11/11/2022    PLT 88 (L) 11/11/2022       Lab Results   Component Value Date    PSA 0.24 10/04/2022       Immunization History   Administered Date(s) Administered    COVID-19, PFIZER Bivalent BOOSTER, DO NOT Dilute, (age 12y+), IM, 30 mcg/0.3 mL 10/24/2022    COVID-19, PFIZER GRAY top, DO NOT Dilute, (age 15 y+), IM, 30 mcg/0.3 mL 04/09/2022    COVID-19, PFIZER PURPLE top, DILUTE for use, (age 15 y+), 30mcg/0.3mL 02/12/2021, 10/11/2021    Influenza Vaccine, unspecified formulation 10/03/2014, 10/02/2015, 10/14/2016    Influenza Whole 11/05/2007, 10/20/2008, 10/07/2009    Influenza, FLUARIX, FLULAVAL, Christian Elliott (age 10 mo+) AND AFLURIA, (age 1 y+), PF, 0.5mL 11/02/2017, 11/15/2018, 11/12/2019, 10/20/2020    Influenza, Christian Elliott (age 72 y+), High Dose, 0.7mL 10/11/2021, 10/24/2022    Pneumococcal Conjugate 13-valent (Williams Bailey) 11/15/2018       Health Maintenance   Topic Date Due    Hepatitis A vaccine (1 of 2 - Risk 2-dose series) Never done    Diabetic foot exam  Never done    Diabetic retinal exam  Never done    Hepatitis C screen  Never done    Hepatitis B vaccine (1 of 3 - Risk 3-dose series) Never done    DTaP/Tdap/Td vaccine (1 - Tdap) Never done    Colorectal Cancer Screen  Never done    Shingles vaccine (1 of 2) Never done    Pneumococcal 65+ years Vaccine (2 - PPSV23 if available, else PCV20) 11/15/2019    Annual Wellness Visit (AWV)  Never done    Depression Screen  07/07/2023    Diabetic Alb to Cr ratio (uACR) test  10/04/2023    Lipids  10/04/2023    A1C test (Diabetic or Prediabetic)  01/06/2024    GFR test (Diabetes, CKD 3-4, OR last GFR 15-59)  01/06/2024    Flu vaccine  Completed    COVID-19 Vaccine  Completed    AAA screen  Completed    Hib vaccine  Aged Out    Meningococcal (ACWY) vaccine  Aged Out       No future appointments. ASSESSMENT       Diagnosis Orders   1. Medicare annual wellness visit, subsequent        2. Type 2 diabetes mellitus without complication, without long-term current use of insulin (HCC)  Comprehensive Metabolic Panel, Fasting    Hemoglobin A1C      3. Primary hypertension        4. Mixed hyperlipidemia        5. Hereditary hemochromatosis ([facility]  Ferritin          PLAN      1. Medicare annual wellness visit, subsequent  BMI reviewed diet/exercise reviewed screening parameters discussed with patient all up-to-date    2.  Type 2 diabetes mellitus without complication, without long-term current use of insulin (HCC)  Dramatic improvement with addition of Trulicity and better diet continue  - Comprehensive Metabolic Panel, Fasting; Future  - Hemoglobin A1C; Future    3. Primary hypertension  Perfectly at goal continue current treatment renal function electrolytes ordered    4. Mixed hyperlipidemia  Tolerating meds well lifestyle modification reinforced    5. Hereditary hemochromatosis ([facility] [address]  Ferritin level looks great repeat at follow-up  - Ferritin; Future     Follow-up 3 months with labs    Preventive Health Topics:  Pt declines HIV and HEP C screening at this time due to lack of risk factors. Encouraged COVID VACCINE  Encouraged FLU VACCINE after October 1st annually. Encouraged TETANUS SHOT (TdaP/ ADACEL/ [address]) per personal pharmacy. Encouraged PREVNAR 20 at this time  Encouraged SHINGLES SHOT [facility]) - check with insurance re coverage and location of administration.         Kiesha Thomas MD  11:35 AM  1/12/23

## 2023-01-24 ENCOUNTER — TELEPHONE (OUTPATIENT)
Dept: FAMILY MEDICINE CLINIC | Age: 68
End: 2023-01-24

## 2023-01-24 RX ORDER — AMOXICILLIN AND CLAVULANATE POTASSIUM 875; 125 MG/1; MG/1
1 TABLET, FILM COATED ORAL 2 TIMES DAILY
Qty: 20 TABLET | Refills: 0 | Status: SHIPPED | OUTPATIENT
Start: 2023-01-24 | End: 2023-02-03

## 2023-01-25 ENCOUNTER — TELEPHONE (OUTPATIENT)
Dept: FAMILY MEDICINE CLINIC | Age: 68
End: 2023-01-25

## 2023-01-26 ENCOUNTER — TELEPHONE (OUTPATIENT)
Dept: FAMILY MEDICINE CLINIC | Age: 68
End: 2023-01-26

## 2023-01-26 NOTE — TELEPHONE ENCOUNTER
On 1/19/2023 received fax from [facility] requesting the patient's most recent diabetic visit, and completed CMN form. I faxed those requested files to [facility]. On 1/25/2023 Tho Bailon from [facility] called requesting the patient's most recent HgA1c results to be faxed to them as well.    I faxed the most recent HgA1c results to [phone]

## 2023-03-14 ENCOUNTER — PATIENT MESSAGE (OUTPATIENT)
Dept: FAMILY MEDICINE CLINIC | Age: 68
End: 2023-03-14

## 2023-03-14 NOTE — TELEPHONE ENCOUNTER
From: Maura Mata  To: Dr. Sully Batista: 3/14/2023 9:55 AM EDT  Subject: Prescription refill    My Metformin 1000mg will run out soon.  I have contacted [facility] and placed the renewal info, they are going to contact you for approval. Thanks

## 2023-03-28 ENCOUNTER — TELEPHONE (OUTPATIENT)
Dept: FAMILY MEDICINE CLINIC | Age: 68
End: 2023-03-28

## 2023-03-28 RX ORDER — PRAMIPEXOLE DIHYDROCHLORIDE 0.25 MG/1
0.25 TABLET ORAL NIGHTLY
Qty: 90 TABLET | Refills: 3 | Status: SHIPPED | OUTPATIENT
Start: 2023-03-28

## 2023-03-28 RX ORDER — ALLOPURINOL 300 MG/1
300 TABLET ORAL DAILY
Qty: 90 TABLET | Refills: 3 | Status: SHIPPED | OUTPATIENT
Start: 2023-03-28

## 2023-03-28 RX ORDER — MONTELUKAST SODIUM 10 MG/1
10 TABLET ORAL NIGHTLY
Qty: 90 TABLET | Refills: 3 | Status: SHIPPED | OUTPATIENT
Start: 2023-03-28

## 2023-03-28 NOTE — TELEPHONE ENCOUNTER
[facility] requesting refills on     Montelukast Sodium Tabs 10 MG  Take 1 tablet Daily  Quantity 90 with 3 Refills    Farxiga Tabs 10 MG  Take 1 Tablet Daily  Quantity 90 with 3 Refills    Pramipexole Dihydrochloride Tabs 0.25 MG  Take 1 Tablet at Bedtime  Quantity 90 with 3 Refills     Allopurinol Tabs 300 MG  Take 1 Tablet Daily  Quantity 90 with 3 Refills

## 2023-04-06 ENCOUNTER — HOSPITAL ENCOUNTER (OUTPATIENT)
Age: 68
Discharge: HOME | End: 2023-04-06
Payer: MEDICARE

## 2023-04-06 DIAGNOSIS — E83.110 HEREDITARY HEMOCHROMATOSIS (HCC): ICD-10-CM

## 2023-04-06 DIAGNOSIS — E11.9 TYPE 2 DIABETES MELLITUS WITHOUT COMPLICATION, WITHOUT LONG-TERM CURRENT USE OF INSULIN (HCC): ICD-10-CM

## 2023-04-06 LAB
ALBUMIN SERPL BCG-MCNC: 4.3 G/DL (ref 3.5–5.1)
ALP SERPL-CCNC: 89 U/L (ref 38–126)
ALT SERPL W/O P-5'-P-CCNC: 28 U/L (ref 11–66)
ANION GAP SERPL CALC-SCNC: 14 MEQ/L (ref 8–16)
AST SERPL-CCNC: 30 U/L (ref 5–40)
BILIRUB SERPL-MCNC: 0.8 MG/DL (ref 0.3–1.2)
BUN SERPL-MCNC: 17 MG/DL (ref 7–22)
CALCIUM SERPL-MCNC: 9.3 MG/DL (ref 8.5–10.5)
CHLORIDE SERPL-SCNC: 106 MEQ/L (ref 98–111)
CO2 SERPL-SCNC: 21 MEQ/L (ref 23–33)
CREAT SERPL-MCNC: 0.8 MG/DL (ref 0.4–1.2)
DEPRECATED MEAN GLUCOSE BLD GHB EST-ACNC: 135 MG/DL (ref 70–126)
FERRITIN SERPL IA-MCNC: 26 NG/ML (ref 22–322)
GFR SERPL CREATININE-BSD FRML MDRD: > 60 ML/MIN/1.73M2
GLUCOSE FASTING: 143 MG/DL (ref 70–108)
HBA1C MFR BLD HPLC: 6.5 % (ref 4.4–6.4)
POTASSIUM SERPL-SCNC: 4.1 MEQ/L (ref 3.5–5.2)
PROT SERPL-MCNC: 7 G/DL (ref 6.1–8)
SODIUM SERPL-SCNC: 141 MEQ/L (ref 135–145)

## 2023-04-06 PROCEDURE — 36415 COLL VENOUS BLD VENIPUNCTURE: CPT

## 2023-04-06 PROCEDURE — 83036 HEMOGLOBIN GLYCOSYLATED A1C: CPT

## 2023-04-06 PROCEDURE — 80053 COMPREHEN METABOLIC PANEL: CPT

## 2023-04-06 PROCEDURE — 82728 ASSAY OF FERRITIN: CPT

## 2023-05-11 ENCOUNTER — HOSPITAL ENCOUNTER (OUTPATIENT)
Dept: ULTRASOUND IMAGING | Age: 68
Discharge: HOME | End: 2023-05-11
Payer: MEDICARE

## 2023-05-11 DIAGNOSIS — K70.30 ALCOHOLIC CIRRHOSIS OF LIVER WITHOUT ASCITES (HCC): ICD-10-CM

## 2023-05-11 DIAGNOSIS — K74.60 HEPATIC CIRRHOSIS, UNSPECIFIED HEPATIC CIRRHOSIS TYPE, UNSPECIFIED WHETHER ASCITES PRESENT (HCC): ICD-10-CM

## 2023-05-11 PROCEDURE — 76705 ECHO EXAM OF ABDOMEN: CPT

## 2023-06-07 ENCOUNTER — TELEPHONE (OUTPATIENT)
Dept: FAMILY MEDICINE CLINIC | Age: 68
End: 2023-06-07

## 2023-06-07 RX ORDER — ATORVASTATIN CALCIUM 10 MG/1
10 TABLET, FILM COATED ORAL NIGHTLY
Qty: 90 TABLET | Refills: 3 | Status: SHIPPED | OUTPATIENT
Start: 2023-06-07

## 2023-06-07 NOTE — TELEPHONE ENCOUNTER
Called patient to let him know we received a prescription refill request from [employer] for the Atorvastatin and that Dr. Radha Charles sent in.

## 2023-06-07 NOTE — TELEPHONE ENCOUNTER
[facility] requesting refill for     Atorvastatin 10 MG Tablet  Take 1 tablet daily     Requesting quantity of 90 with 3 refills

## 2023-08-31 ENCOUNTER — PATIENT MESSAGE (OUTPATIENT)
Dept: FAMILY MEDICINE CLINIC | Age: 68
End: 2023-08-31

## 2023-08-31 DIAGNOSIS — F41.1 GAD (GENERALIZED ANXIETY DISORDER): Primary | ICD-10-CM

## 2023-08-31 NOTE — TELEPHONE ENCOUNTER
From: Nayely Arce  To: Dr. Bayron Vega: 8/31/2023 2:34 PM EDT  Subject: Debo [address] Dr,  This past week I have been feeling anxious at times for no real reason that I can think of. It comes and goes somewhat. I have a prescription of Xanax that you prescribed in 2014. It was for 10 pills, I have taken two in the last couple of days and have three left. After I take one I feel just fine for 24 hours or so. No other symptoms except a little upset stomach when I am anxious. Any suggestions?     Hansa Han

## 2023-09-01 RX ORDER — ALPRAZOLAM 0.25 MG/1
0.25 TABLET ORAL 3 TIMES DAILY PRN
Qty: 15 TABLET | Refills: 0 | Status: SHIPPED | OUTPATIENT
Start: 2023-09-01 | End: 2023-09-08

## 2023-09-01 RX ORDER — ESCITALOPRAM OXALATE 10 MG/1
10 TABLET ORAL DAILY
Qty: 30 TABLET | Refills: 3 | Status: SHIPPED | OUTPATIENT
Start: 2023-09-01

## 2023-09-01 NOTE — TELEPHONE ENCOUNTER
Discussed with patient. We will do a trial of Lexapro to control his symptoms Xanax for breakthrough symptoms and will reinitiating treatment. OARRS report reviewed and appropriate risk and benefit of above drugs discussed.   Patient will message me next week with update

## 2023-10-06 ENCOUNTER — HOSPITAL ENCOUNTER (OUTPATIENT)
Age: 68
Discharge: HOME | End: 2023-10-06
Payer: MEDICARE

## 2023-10-06 DIAGNOSIS — I10 PRIMARY HYPERTENSION: ICD-10-CM

## 2023-10-06 DIAGNOSIS — E78.2 MIXED HYPERLIPIDEMIA: ICD-10-CM

## 2023-10-06 DIAGNOSIS — E83.110 HEREDITARY HEMOCHROMATOSIS (HCC): ICD-10-CM

## 2023-10-06 DIAGNOSIS — M1A.00X0 IDIOPATHIC CHRONIC GOUT WITHOUT TOPHUS, UNSPECIFIED SITE: ICD-10-CM

## 2023-10-06 DIAGNOSIS — Z12.5 PROSTATE CANCER SCREENING: ICD-10-CM

## 2023-10-06 DIAGNOSIS — E11.9 TYPE 2 DIABETES MELLITUS WITHOUT COMPLICATION, WITHOUT LONG-TERM CURRENT USE OF INSULIN (HCC): ICD-10-CM

## 2023-10-06 LAB
ALBUMIN SERPL BCG-MCNC: 4.1 G/DL (ref 3.5–5.1)
ALP SERPL-CCNC: 76 U/L (ref 38–126)
ALT SERPL W/O P-5'-P-CCNC: 28 U/L (ref 11–66)
ANION GAP SERPL CALC-SCNC: 12 MEQ/L (ref 8–16)
AST SERPL-CCNC: 29 U/L (ref 5–40)
BASOPHILS ABSOLUTE: 0 THOU/MM3 (ref 0–0.1)
BASOPHILS NFR BLD AUTO: 1.1 %
BILIRUB SERPL-MCNC: 0.7 MG/DL (ref 0.3–1.2)
BUN SERPL-MCNC: 13 MG/DL (ref 7–22)
CALCIUM SERPL-MCNC: 9.3 MG/DL (ref 8.5–10.5)
CHLORIDE SERPL-SCNC: 108 MEQ/L (ref 98–111)
CHOLESTEROL, FASTING: 161 MG/DL (ref 100–199)
CO2 SERPL-SCNC: 24 MEQ/L (ref 23–33)
CREAT SERPL-MCNC: 0.9 MG/DL (ref 0.4–1.2)
CREAT UR-MCNC: 97.3 MG/DL
DEPRECATED MEAN GLUCOSE BLD GHB EST-ACNC: 129 MG/DL (ref 70–126)
DEPRECATED RDW RBC AUTO: 53 FL (ref 35–45)
EOSINOPHIL NFR BLD AUTO: 4.8 %
EOSINOPHILS ABSOLUTE: 0.2 THOU/MM3 (ref 0–0.4)
ERYTHROCYTE [DISTWIDTH] IN BLOOD BY AUTOMATED COUNT: 15.1 % (ref 11.5–14.5)
FERRITIN SERPL IA-MCNC: 40 NG/ML (ref 22–322)
GFR SERPL CREATININE-BSD FRML MDRD: > 60 ML/MIN/1.73M2
GLUCOSE FASTING: 138 MG/DL (ref 70–108)
HBA1C MFR BLD HPLC: 6.3 % (ref 4.4–6.4)
HCT VFR BLD AUTO: 45.2 % (ref 42–52)
HDLC SERPL-MCNC: 51 MG/DL
HGB BLD-MCNC: 14.4 GM/DL (ref 14–18)
IMM GRANULOCYTES # BLD AUTO: 0 THOU/MM3 (ref 0–0.07)
IMM GRANULOCYTES NFR BLD AUTO: 0 %
LDLC SERPL CALC-MCNC: 83 MG/DL
LYMPHOCYTES ABSOLUTE: 0.7 THOU/MM3 (ref 1–4.8)
LYMPHOCYTES NFR BLD AUTO: 20.4 %
MCH RBC QN AUTO: 30.1 PG (ref 26–33)
MCHC RBC AUTO-ENTMCNC: 31.9 GM/DL (ref 32.2–35.5)
MCV RBC AUTO: 94.6 FL (ref 80–94)
MICROALBUMIN UR-MCNC: < 1.2 MG/DL
MICROALBUMIN/CREAT RATIO PNL UR: 12 MG/G (ref 0–30)
MONOCYTES ABSOLUTE: 0.3 THOU/MM3 (ref 0.4–1.3)
MONOCYTES NFR BLD AUTO: 8.4 %
NEUTROPHILS NFR BLD AUTO: 65.3 %
NRBC BLD AUTO-RTO: 0 /100 WBC
PLATELET # BLD AUTO: 80 THOU/MM3 (ref 130–400)
PLATELET BLD QL SMEAR: ABNORMAL
PMV BLD AUTO: 11.4 FL (ref 9.4–12.4)
POTASSIUM SERPL-SCNC: 4.3 MEQ/L (ref 3.5–5.2)
PROT SERPL-MCNC: 6.6 G/DL (ref 6.1–8)
PSA SERPL-MCNC: 0.25 NG/ML (ref 0–1)
RBC # BLD AUTO: 4.78 MILL/MM3 (ref 4.7–6.1)
SCAN OF BLOOD SMEAR: NORMAL
SEGMENTED NEUTROPHILS ABSOLUTE COUNT: 2.4 THOU/MM3 (ref 1.8–7.7)
SODIUM SERPL-SCNC: 144 MEQ/L (ref 135–145)
TRIGLYCERIDE, FASTING: 134 MG/DL (ref 0–199)
TSH SERPL DL<=0.005 MIU/L-ACNC: 3.84 UIU/ML (ref 0.4–4.2)
URATE SERPL-MCNC: 3.2 MG/DL (ref 3.7–7)
VIT B12 SERPL-MCNC: 948 PG/ML (ref 211–911)
WBC # BLD AUTO: 3.6 THOU/MM3 (ref 4.8–10.8)

## 2023-10-06 PROCEDURE — 82607 VITAMIN B-12: CPT

## 2023-10-06 PROCEDURE — 36415 COLL VENOUS BLD VENIPUNCTURE: CPT

## 2023-10-06 PROCEDURE — 84443 ASSAY THYROID STIM HORMONE: CPT

## 2023-10-06 PROCEDURE — 85025 COMPLETE CBC W/AUTO DIFF WBC: CPT

## 2023-10-06 PROCEDURE — 82728 ASSAY OF FERRITIN: CPT

## 2023-10-06 PROCEDURE — 80061 LIPID PANEL: CPT

## 2023-10-06 PROCEDURE — 83036 HEMOGLOBIN GLYCOSYLATED A1C: CPT

## 2023-10-06 PROCEDURE — 80053 COMPREHEN METABOLIC PANEL: CPT

## 2023-10-06 PROCEDURE — G0103 PSA SCREENING: HCPCS

## 2023-10-06 PROCEDURE — 82043 UR ALBUMIN QUANTITATIVE: CPT

## 2023-10-06 PROCEDURE — 84550 ASSAY OF BLOOD/URIC ACID: CPT

## 2023-10-12 ENCOUNTER — HOSPITAL ENCOUNTER (OUTPATIENT)
Age: 68
Discharge: HOME | End: 2023-10-12
Payer: MEDICARE

## 2023-10-12 ENCOUNTER — HOSPITAL ENCOUNTER (OUTPATIENT)
Dept: GENERAL RADIOLOGY | Age: 68
Discharge: HOME | End: 2023-10-12
Payer: MEDICARE

## 2023-10-12 ENCOUNTER — OFFICE VISIT (OUTPATIENT)
Dept: FAMILY MEDICINE CLINIC | Age: 68
End: 2023-10-12
Payer: MEDICARE

## 2023-10-12 VITALS
DIASTOLIC BLOOD PRESSURE: 70 MMHG | SYSTOLIC BLOOD PRESSURE: 112 MMHG | BODY MASS INDEX: 29.46 KG/M2 | WEIGHT: 210.4 LBS | OXYGEN SATURATION: 94 % | HEIGHT: 71 IN | HEART RATE: 81 BPM

## 2023-10-12 DIAGNOSIS — F41.1 GAD (GENERALIZED ANXIETY DISORDER): ICD-10-CM

## 2023-10-12 DIAGNOSIS — E11.9 TYPE 2 DIABETES MELLITUS WITHOUT COMPLICATION, WITHOUT LONG-TERM CURRENT USE OF INSULIN (HCC): Primary | ICD-10-CM

## 2023-10-12 DIAGNOSIS — E83.110 HEREDITARY HEMOCHROMATOSIS (HCC): ICD-10-CM

## 2023-10-12 DIAGNOSIS — I85.11 SECONDARY ESOPHAGEAL VARICES WITH BLEEDING (HCC): ICD-10-CM

## 2023-10-12 DIAGNOSIS — D69.6 THROMBOCYTOPENIA, UNSPECIFIED (HCC): ICD-10-CM

## 2023-10-12 DIAGNOSIS — M79.671 RIGHT FOOT PAIN: ICD-10-CM

## 2023-10-12 DIAGNOSIS — M1A.00X0 IDIOPATHIC CHRONIC GOUT WITHOUT TOPHUS, UNSPECIFIED SITE: ICD-10-CM

## 2023-10-12 DIAGNOSIS — E78.2 MIXED HYPERLIPIDEMIA: ICD-10-CM

## 2023-10-12 DIAGNOSIS — J30.1 SEASONAL ALLERGIC RHINITIS DUE TO POLLEN: ICD-10-CM

## 2023-10-12 DIAGNOSIS — I10 PRIMARY HYPERTENSION: ICD-10-CM

## 2023-10-12 PROCEDURE — 99214 OFFICE O/P EST MOD 30 MIN: CPT | Performed by: FAMILY MEDICINE

## 2023-10-12 PROCEDURE — 3074F SYST BP LT 130 MM HG: CPT | Performed by: FAMILY MEDICINE

## 2023-10-12 PROCEDURE — 3078F DIAST BP <80 MM HG: CPT | Performed by: FAMILY MEDICINE

## 2023-10-12 PROCEDURE — 73630 X-RAY EXAM OF FOOT: CPT

## 2023-10-12 PROCEDURE — 1123F ACP DISCUSS/DSCN MKR DOCD: CPT | Performed by: FAMILY MEDICINE

## 2023-10-12 PROCEDURE — 3044F HG A1C LEVEL LT 7.0%: CPT | Performed by: FAMILY MEDICINE

## 2023-10-12 RX ORDER — ESCITALOPRAM OXALATE 20 MG/1
20 TABLET ORAL DAILY
Qty: 90 TABLET | Refills: 3 | Status: SHIPPED | OUTPATIENT
Start: 2023-10-12

## 2023-10-12 ASSESSMENT — PATIENT HEALTH QUESTIONNAIRE - PHQ9
SUM OF ALL RESPONSES TO PHQ QUESTIONS 1-9: 0
SUM OF ALL RESPONSES TO PHQ9 QUESTIONS 1 & 2: 0
1. LITTLE INTEREST OR PLEASURE IN DOING THINGS: 0
2. FEELING DOWN, DEPRESSED OR HOPELESS: 0
SUM OF ALL RESPONSES TO PHQ QUESTIONS 1-9: 0

## 2023-10-12 ASSESSMENT — ENCOUNTER SYMPTOMS
NAUSEA: 0
DIARRHEA: 0
SHORTNESS OF BREATH: 0
VOMITING: 0
CONSTIPATION: 0
COUGH: 0

## 2023-10-12 NOTE — PROGRESS NOTES
[address] [place] [id number] [phone]    Omar La (:  1955) is a 69 y.o. male, here for evaluation of the following chief complaint(s):  Results (Pt here to f/u for lab results done 10/06/2023 and results in [address] [place] [address] 15. Pt also states that since starting Lexapro, has noticed a good change and denies SE from it. Also has c/o Rt ankle being slightly swollen and will be painful if sitting for a while and then going to walk on it. States had been like this for about 2.5 weeks. )           ASSESSMENT/PLAN:  1. Type 2 diabetes mellitus without complication, without long-term current use of insulin (HCC)  A1c well-controlled no change continue current treatment recheck in 3 months  - CBC with Auto Differential; Future  - Comprehensive Metabolic Panel, Fasting; Future  - Hemoglobin A1C; Future    2. Thrombocytopenia, unspecified (HCC)  Platelets stable in the 80s continue to follow closely no recent bleeding issues  - CBC with Auto Differential; Future    3. CARSON (generalized anxiety disorder)  Patient doing fantastic on Lexapro 20 continue refill sent  - escitalopram (LEXAPRO) 20 MG tablet; Take 1 tablet by mouth daily  Dispense: 90 tablet; Refill: 3    4. Idiopathic chronic gout without tophus, unspecified site  Uric acid level 3.2 continue allopurinol no recent gout attacks    5. Mixed hyperlipidemia  Tolerating meds well no change  - Comprehensive Metabolic Panel, Fasting; Future    6. Primary hypertension  Blood pressure looks great no change follow renal function electrolytes  - Comprehensive Metabolic Panel, Fasting; Future    7. Secondary esophageal varices with bleeding (HCC)  No recent bleeding continue follow-up with GI    8. Seasonal allergic rhinitis due to pollen  Well-controlled with over-the-counter Zyrtec and Flonase continue    9. Hereditary hemochromatosis ([address])  Follow-up through hematology    10.

## 2023-10-19 ENCOUNTER — HOSPITAL ENCOUNTER (OUTPATIENT)
Dept: ULTRASOUND IMAGING | Age: 68
Discharge: HOME | End: 2023-10-19
Payer: MEDICARE

## 2023-10-19 DIAGNOSIS — K70.30 ALCOHOLIC CIRRHOSIS OF LIVER WITHOUT ASCITES (HCC): ICD-10-CM

## 2023-10-19 PROCEDURE — 76705 ECHO EXAM OF ABDOMEN: CPT

## 2024-01-25 ENCOUNTER — HOSPITAL ENCOUNTER (OUTPATIENT)
Age: 69
Discharge: HOME | End: 2024-01-25
Payer: MEDICARE

## 2024-01-25 DIAGNOSIS — I10 PRIMARY HYPERTENSION: ICD-10-CM

## 2024-01-25 DIAGNOSIS — E11.9 TYPE 2 DIABETES MELLITUS WITHOUT COMPLICATION, WITHOUT LONG-TERM CURRENT USE OF INSULIN (HCC): ICD-10-CM

## 2024-01-25 DIAGNOSIS — E78.2 MIXED HYPERLIPIDEMIA: ICD-10-CM

## 2024-01-25 DIAGNOSIS — D69.6 THROMBOCYTOPENIA, UNSPECIFIED (HCC): ICD-10-CM

## 2024-01-25 LAB
ALBUMIN SERPL BCG-MCNC: 4.5 G/DL (ref 3.5–5.1)
ALP SERPL-CCNC: 85 U/L (ref 38–126)
ALT SERPL W/O P-5'-P-CCNC: 26 U/L (ref 11–66)
ANION GAP SERPL CALC-SCNC: 12 MEQ/L (ref 8–16)
AST SERPL-CCNC: 31 U/L (ref 5–40)
BASOPHILS ABSOLUTE: 0.1 THOU/MM3 (ref 0–0.1)
BASOPHILS NFR BLD AUTO: 1.2 %
BILIRUB SERPL-MCNC: 0.8 MG/DL (ref 0.3–1.2)
BUN SERPL-MCNC: 13 MG/DL (ref 7–22)
CALCIUM SERPL-MCNC: 9.5 MG/DL (ref 8.5–10.5)
CHLORIDE SERPL-SCNC: 105 MEQ/L (ref 98–111)
CO2 SERPL-SCNC: 23 MEQ/L (ref 23–33)
CREAT SERPL-MCNC: 0.8 MG/DL (ref 0.4–1.2)
DEPRECATED MEAN GLUCOSE BLD GHB EST-ACNC: 135 MG/DL (ref 70–126)
DEPRECATED RDW RBC AUTO: 51.8 FL (ref 35–45)
EOSINOPHIL NFR BLD AUTO: 5.5 %
EOSINOPHILS ABSOLUTE: 0.2 THOU/MM3 (ref 0–0.4)
ERYTHROCYTE [DISTWIDTH] IN BLOOD BY AUTOMATED COUNT: 15.2 % (ref 11.5–14.5)
GFR SERPL CREATININE-BSD FRML MDRD: > 60 ML/MIN/1.73M2
GLUCOSE FASTING: 140 MG/DL (ref 70–108)
HBA1C MFR BLD HPLC: 6.5 % (ref 4.4–6.4)
HCT VFR BLD AUTO: 45.4 % (ref 42–52)
HGB BLD-MCNC: 14.5 GM/DL (ref 14–18)
IMM GRANULOCYTES # BLD AUTO: 0.01 THOU/MM3 (ref 0–0.07)
IMM GRANULOCYTES NFR BLD AUTO: 0.2 %
LYMPHOCYTES ABSOLUTE: 0.8 THOU/MM3 (ref 1–4.8)
LYMPHOCYTES NFR BLD AUTO: 19.5 %
MCH RBC QN AUTO: 30 PG (ref 26–33)
MCHC RBC AUTO-ENTMCNC: 31.9 GM/DL (ref 32.2–35.5)
MCV RBC AUTO: 93.8 FL (ref 80–94)
MONOCYTES ABSOLUTE: 0.4 THOU/MM3 (ref 0.4–1.3)
MONOCYTES NFR BLD AUTO: 9.4 %
NEUTROPHILS NFR BLD AUTO: 64.2 %
NRBC BLD AUTO-RTO: 0 /100 WBC
PLATELET # BLD AUTO: 79 THOU/MM3 (ref 130–400)
PMV BLD AUTO: 12.2 FL (ref 9.4–12.4)
POTASSIUM SERPL-SCNC: 4.1 MEQ/L (ref 3.5–5.2)
PROT SERPL-MCNC: 7 G/DL (ref 6.1–8)
RBC # BLD AUTO: 4.84 MILL/MM3 (ref 4.7–6.1)
SCAN OF BLOOD SMEAR: NORMAL
SEGMENTED NEUTROPHILS ABSOLUTE COUNT: 2.7 THOU/MM3 (ref 1.8–7.7)
SODIUM SERPL-SCNC: 140 MEQ/L (ref 135–145)
WBC # BLD AUTO: 4.2 THOU/MM3 (ref 4.8–10.8)

## 2024-01-25 PROCEDURE — 83036 HEMOGLOBIN GLYCOSYLATED A1C: CPT

## 2024-01-25 PROCEDURE — 36415 COLL VENOUS BLD VENIPUNCTURE: CPT

## 2024-01-25 PROCEDURE — 80053 COMPREHEN METABOLIC PANEL: CPT

## 2024-01-25 PROCEDURE — 85025 COMPLETE CBC W/AUTO DIFF WBC: CPT

## 2024-01-29 SDOH — HEALTH STABILITY: PHYSICAL HEALTH: ON AVERAGE, HOW MANY DAYS PER WEEK DO YOU ENGAGE IN MODERATE TO STRENUOUS EXERCISE (LIKE A BRISK WALK)?: 6 DAYS

## 2024-01-29 SDOH — HEALTH STABILITY: PHYSICAL HEALTH: ON AVERAGE, HOW MANY MINUTES DO YOU ENGAGE IN EXERCISE AT THIS LEVEL?: 50 MIN

## 2024-01-29 ASSESSMENT — LIFESTYLE VARIABLES
HOW OFTEN DO YOU HAVE A DRINK CONTAINING ALCOHOL: NEVER
HOW MANY STANDARD DRINKS CONTAINING ALCOHOL DO YOU HAVE ON A TYPICAL DAY: PATIENT DOES NOT DRINK
HOW MANY STANDARD DRINKS CONTAINING ALCOHOL DO YOU HAVE ON A TYPICAL DAY: 0
HOW OFTEN DO YOU HAVE SIX OR MORE DRINKS ON ONE OCCASION: 1
HOW OFTEN DO YOU HAVE A DRINK CONTAINING ALCOHOL: 1

## 2024-02-01 ENCOUNTER — OFFICE VISIT (OUTPATIENT)
Dept: FAMILY MEDICINE CLINIC | Age: 69
End: 2024-02-01

## 2024-02-01 VITALS
DIASTOLIC BLOOD PRESSURE: 74 MMHG | HEART RATE: 80 BPM | WEIGHT: 221.4 LBS | SYSTOLIC BLOOD PRESSURE: 120 MMHG | HEIGHT: 71 IN | BODY MASS INDEX: 31 KG/M2 | OXYGEN SATURATION: 98 %

## 2024-02-01 DIAGNOSIS — I10 PRIMARY HYPERTENSION: ICD-10-CM

## 2024-02-01 DIAGNOSIS — D69.6 THROMBOCYTOPENIA, UNSPECIFIED (HCC): ICD-10-CM

## 2024-02-01 DIAGNOSIS — E78.2 MIXED HYPERLIPIDEMIA: ICD-10-CM

## 2024-02-01 DIAGNOSIS — E83.110 HEREDITARY HEMOCHROMATOSIS (HCC): ICD-10-CM

## 2024-02-01 DIAGNOSIS — E11.9 TYPE 2 DIABETES MELLITUS WITHOUT COMPLICATION, WITHOUT LONG-TERM CURRENT USE OF INSULIN (HCC): ICD-10-CM

## 2024-02-01 DIAGNOSIS — M1A.00X0 IDIOPATHIC CHRONIC GOUT WITHOUT TOPHUS, UNSPECIFIED SITE: ICD-10-CM

## 2024-02-01 DIAGNOSIS — F41.1 GAD (GENERALIZED ANXIETY DISORDER): ICD-10-CM

## 2024-02-01 DIAGNOSIS — J30.1 SEASONAL ALLERGIC RHINITIS DUE TO POLLEN: ICD-10-CM

## 2024-02-01 DIAGNOSIS — Z00.00 MEDICARE ANNUAL WELLNESS VISIT, SUBSEQUENT: Primary | ICD-10-CM

## 2024-02-01 RX ORDER — ALLOPURINOL 300 MG/1
300 TABLET ORAL DAILY
Qty: 90 TABLET | Refills: 3 | Status: SHIPPED | OUTPATIENT
Start: 2024-02-01

## 2024-02-01 ASSESSMENT — ENCOUNTER SYMPTOMS
SORE THROAT: 0
DIARRHEA: 0
ABDOMINAL PAIN: 0
SHORTNESS OF BREATH: 0
TROUBLE SWALLOWING: 0
COUGH: 0
VOMITING: 0
CONSTIPATION: 0

## 2024-02-01 NOTE — PROGRESS NOTES
[facility]  [address]  [place] [address]  Dept: [phone]     SUBJECTIVE     Clarence Troncoso is a 68 y.o.male    Pt presents for Medicare wellness.    Pt feeling ok since last visit- interval history and any new issues noted below:       Patient Active Problem List   Diagnosis    Hereditary hemochromatosis (HCC)    Type 2 diabetes mellitus without complication, without long-term current use of insulin (HCC)    Primary hypertension    Seasonal allergic rhinitis due to pollen    Mixed hyperlipidemia    Thrombocytopenia, unspecified (HCC)    CARSON (generalized anxiety disorder)    Idiopathic chronic gout without tophus       Current Outpatient Medications   Medication Sig Dispense Refill    allopurinol (ZYLOPRIM) 300 MG tablet Take 1 tablet by mouth daily 90 tablet 3    dapagliflozin (FARXIGA) 10 MG tablet Take 1 tablet by mouth nightly 90 tablet 3    metFORMIN (GLUCOPHAGE) 1000 MG tablet Take 1 tablet by mouth 2 times daily (with meals) 180 tablet 3    escitalopram (LEXAPRO) 20 MG tablet Take 1 tablet by mouth daily 90 tablet 3    atorvastatin (LIPITOR) 10 MG tablet Take 1 tablet by mouth nightly 90 tablet 3    Dulaglutide 3 MG/0.5ML SOPN Inject 3 mg into the skin every 7 days 12 Adjustable Dose Pre-filled Pen Syringe 3    fluticasone (FLONASE) 50 MCG/ACT nasal spray 2 sprays by Nasal route daily      carvedilol (COREG) 12.5 MG tablet TAKE 1 TABLET BY MOUTH TWICE DAILY      Peppermint Oil 90 MG CPCR Take 1 capsule by mouth 2 times daily      pantoprazole (PROTONIX) 40 MG tablet Take 1 tablet by mouth 2 times daily 60 tablet 3    cetirizine (ZYRTEC) 10 MG tablet Take 1 tablet by mouth daily      Misc Natural Products (OSTEO BI-FLEX ADV JOINT SHIELD PO) Take by mouth daily      Multiple Vitamins-Minerals (MULTIVITAMIN ADULT PO) Take 1 tablet by mouth daily       No current facility-administered medications for this visit.       Review of Systems   Constitutional:

## 2024-04-29 ENCOUNTER — HOSPITAL ENCOUNTER (OUTPATIENT)
Age: 69
Discharge: HOME | End: 2024-04-29
Payer: MEDICARE

## 2024-04-29 DIAGNOSIS — E11.9 TYPE 2 DIABETES MELLITUS WITHOUT COMPLICATION, WITHOUT LONG-TERM CURRENT USE OF INSULIN (HCC): ICD-10-CM

## 2024-04-29 DIAGNOSIS — I10 PRIMARY HYPERTENSION: ICD-10-CM

## 2024-04-29 DIAGNOSIS — D69.6 THROMBOCYTOPENIA, UNSPECIFIED (HCC): ICD-10-CM

## 2024-04-29 DIAGNOSIS — E78.2 MIXED HYPERLIPIDEMIA: ICD-10-CM

## 2024-04-29 LAB
ALBUMIN SERPL BCG-MCNC: 4.1 G/DL (ref 3.5–5.1)
ALP SERPL-CCNC: 80 U/L (ref 38–126)
ALT SERPL W/O P-5'-P-CCNC: 26 U/L (ref 11–66)
ANION GAP SERPL CALC-SCNC: 12 MEQ/L (ref 8–16)
AST SERPL-CCNC: 30 U/L (ref 5–40)
BASOPHILS ABSOLUTE: 0 THOU/MM3 (ref 0–0.1)
BASOPHILS NFR BLD AUTO: 1 %
BILIRUB SERPL-MCNC: 0.9 MG/DL (ref 0.3–1.2)
BUN SERPL-MCNC: 13 MG/DL (ref 7–22)
CALCIUM SERPL-MCNC: 9 MG/DL (ref 8.5–10.5)
CHLORIDE SERPL-SCNC: 105 MEQ/L (ref 98–111)
CO2 SERPL-SCNC: 24 MEQ/L (ref 23–33)
CREAT SERPL-MCNC: 0.7 MG/DL (ref 0.4–1.2)
DEPRECATED MEAN GLUCOSE BLD GHB EST-ACNC: 150 MG/DL (ref 70–126)
DEPRECATED RDW RBC AUTO: 52.5 FL (ref 35–45)
EOSINOPHIL NFR BLD AUTO: 4.3 %
EOSINOPHILS ABSOLUTE: 0.2 THOU/MM3 (ref 0–0.4)
ERYTHROCYTE [DISTWIDTH] IN BLOOD BY AUTOMATED COUNT: 15.1 % (ref 11.5–14.5)
GFR SERPL CREATININE-BSD FRML MDRD: > 90 ML/MIN/1.73M2
GLUCOSE SERPL-MCNC: 180 MG/DL (ref 70–108)
HBA1C MFR BLD HPLC: 7 % (ref 4.4–6.4)
HCT VFR BLD AUTO: 46 % (ref 42–52)
HGB BLD-MCNC: 14.8 GM/DL (ref 14–18)
IMM GRANULOCYTES # BLD AUTO: 0.02 THOU/MM3 (ref 0–0.07)
IMM GRANULOCYTES NFR BLD AUTO: 0.5 %
LYMPHOCYTES ABSOLUTE: 0.8 THOU/MM3 (ref 1–4.8)
LYMPHOCYTES NFR BLD AUTO: 19.2 %
MCH RBC QN AUTO: 30.6 PG (ref 26–33)
MCHC RBC AUTO-ENTMCNC: 32.2 GM/DL (ref 32.2–35.5)
MCV RBC AUTO: 95 FL (ref 80–94)
MONOCYTES ABSOLUTE: 0.4 THOU/MM3 (ref 0.4–1.3)
MONOCYTES NFR BLD AUTO: 10.1 %
NEUTROPHILS NFR BLD AUTO: 64.9 %
NRBC BLD AUTO-RTO: 1 /100 WBC
PLATELET # BLD AUTO: 78 THOU/MM3 (ref 130–400)
PMV BLD AUTO: 12.5 FL (ref 9.4–12.4)
POTASSIUM SERPL-SCNC: 3.9 MEQ/L (ref 3.5–5.2)
PROT SERPL-MCNC: 7.2 G/DL (ref 6.1–8)
RBC # BLD AUTO: 4.84 MILL/MM3 (ref 4.7–6.1)
SCAN OF BLOOD SMEAR: NORMAL
SEGMENTED NEUTROPHILS ABSOLUTE COUNT: 2.6 THOU/MM3 (ref 1.8–7.7)
SODIUM SERPL-SCNC: 141 MEQ/L (ref 135–145)
WBC # BLD AUTO: 4 THOU/MM3 (ref 4.8–10.8)

## 2024-04-29 PROCEDURE — 36415 COLL VENOUS BLD VENIPUNCTURE: CPT

## 2024-04-29 PROCEDURE — 83036 HEMOGLOBIN GLYCOSYLATED A1C: CPT

## 2024-04-29 PROCEDURE — 85025 COMPLETE CBC W/AUTO DIFF WBC: CPT

## 2024-04-29 PROCEDURE — 80053 COMPREHEN METABOLIC PANEL: CPT

## 2024-04-29 SDOH — ECONOMIC STABILITY: HOUSING INSECURITY
IN THE LAST 12 MONTHS, WAS THERE A TIME WHEN YOU DID NOT HAVE A STEADY PLACE TO SLEEP OR SLEPT IN A SHELTER (INCLUDING NOW)?: NO

## 2024-04-29 SDOH — ECONOMIC STABILITY: FOOD INSECURITY: WITHIN THE PAST 12 MONTHS, YOU WORRIED THAT YOUR FOOD WOULD RUN OUT BEFORE YOU GOT MONEY TO BUY MORE.: NEVER TRUE

## 2024-04-29 SDOH — ECONOMIC STABILITY: TRANSPORTATION INSECURITY
IN THE PAST 12 MONTHS, HAS LACK OF TRANSPORTATION KEPT YOU FROM MEETINGS, WORK, OR FROM GETTING THINGS NEEDED FOR DAILY LIVING?: NO

## 2024-04-29 SDOH — ECONOMIC STABILITY: FOOD INSECURITY: WITHIN THE PAST 12 MONTHS, THE FOOD YOU BOUGHT JUST DIDN'T LAST AND YOU DIDN'T HAVE MONEY TO GET MORE.: NEVER TRUE

## 2024-04-29 SDOH — ECONOMIC STABILITY: INCOME INSECURITY: HOW HARD IS IT FOR YOU TO PAY FOR THE VERY BASICS LIKE FOOD, HOUSING, MEDICAL CARE, AND HEATING?: NOT HARD AT ALL

## 2024-05-02 ENCOUNTER — OFFICE VISIT (OUTPATIENT)
Dept: FAMILY MEDICINE CLINIC | Age: 69
End: 2024-05-02
Payer: MEDICARE

## 2024-05-02 VITALS
DIASTOLIC BLOOD PRESSURE: 64 MMHG | WEIGHT: 219.4 LBS | HEIGHT: 71 IN | SYSTOLIC BLOOD PRESSURE: 118 MMHG | BODY MASS INDEX: 30.72 KG/M2 | HEART RATE: 82 BPM | OXYGEN SATURATION: 95 %

## 2024-05-02 DIAGNOSIS — M1A.00X0 IDIOPATHIC CHRONIC GOUT WITHOUT TOPHUS, UNSPECIFIED SITE: ICD-10-CM

## 2024-05-02 DIAGNOSIS — E11.9 TYPE 2 DIABETES MELLITUS WITHOUT COMPLICATION, WITHOUT LONG-TERM CURRENT USE OF INSULIN (HCC): ICD-10-CM

## 2024-05-02 DIAGNOSIS — E83.110 HEREDITARY HEMOCHROMATOSIS (HCC): ICD-10-CM

## 2024-05-02 DIAGNOSIS — D69.6 THROMBOCYTOPENIA, UNSPECIFIED (HCC): ICD-10-CM

## 2024-05-02 DIAGNOSIS — I10 PRIMARY HYPERTENSION: Primary | ICD-10-CM

## 2024-05-02 DIAGNOSIS — F41.1 GAD (GENERALIZED ANXIETY DISORDER): ICD-10-CM

## 2024-05-02 DIAGNOSIS — E78.2 MIXED HYPERLIPIDEMIA: ICD-10-CM

## 2024-05-02 DIAGNOSIS — J30.1 SEASONAL ALLERGIC RHINITIS DUE TO POLLEN: ICD-10-CM

## 2024-05-02 PROCEDURE — 3051F HG A1C>EQUAL 7.0%<8.0%: CPT | Performed by: FAMILY MEDICINE

## 2024-05-02 PROCEDURE — 1123F ACP DISCUSS/DSCN MKR DOCD: CPT | Performed by: FAMILY MEDICINE

## 2024-05-02 PROCEDURE — 3074F SYST BP LT 130 MM HG: CPT | Performed by: FAMILY MEDICINE

## 2024-05-02 PROCEDURE — 99214 OFFICE O/P EST MOD 30 MIN: CPT | Performed by: FAMILY MEDICINE

## 2024-05-02 PROCEDURE — 3078F DIAST BP <80 MM HG: CPT | Performed by: FAMILY MEDICINE

## 2024-05-02 RX ORDER — ATORVASTATIN CALCIUM 10 MG/1
10 TABLET, FILM COATED ORAL NIGHTLY
Qty: 90 TABLET | Refills: 3 | Status: SHIPPED | OUTPATIENT
Start: 2024-05-02

## 2024-05-02 NOTE — PROGRESS NOTES
normal. There is no distension.      Palpations: Abdomen is soft. There is no mass.      Tenderness: There is no abdominal tenderness. There is no guarding or rebound.   Musculoskeletal:         General: No swelling. Normal range of motion.      Cervical back: Normal range of motion. No tenderness.      Right lower leg: No edema.      Left lower leg: No edema.   Lymphadenopathy:      Cervical: No cervical adenopathy.   Skin:     General: Skin is warm.      Findings: No rash.   Neurological:      General: No focal deficit present.      Mental Status: He is alert.   Psychiatric:         Mood and Affect: Mood normal.         No results found for this visit on 05/02/24.    Lab Results   Component Value Date    LABA1C 7.0 (H) 04/29/2024       Lab Results   Component Value Date    CHOL 184 06/29/2022    TRIG 180 06/29/2022    HDL 51 10/06/2023       The 10-year ASCVD risk score (Zuleyka BRIGGS, et al., 2019) is: 29.1%    Values used to calculate the score:      Age: 68 years      Sex: Male      Is Non- : No      Diabetic: Yes      Tobacco smoker: No      Systolic Blood Pressure: 118 mmHg      Is BP treated: Yes      HDL Cholesterol: 51 mg/dL      Total Cholesterol: 161 mg/dl    Lab Results   Component Value Date     04/29/2024    K 3.9 04/29/2024     04/29/2024    CO2 24 04/29/2024    BUN 13 04/29/2024    CREATININE 0.7 04/29/2024    GLUCOSE 180 (H) 04/29/2024    CALCIUM 9.0 04/29/2024    BILITOT 0.9 04/29/2024    ALKPHOS 80 04/29/2024    AST 30 04/29/2024    ALT 26 04/29/2024    LABGLOM >90 04/29/2024     estimated creatinine clearance is 118 mL/min (based on SCr of 0.7 mg/dL).     No results found for: \"[id number]\"    Lab Results   Component Value Date    TSH 3.840 10/06/2023       Lab Results   Component Value Date    WBC 4.0 (L) 04/29/2024    HGB 14.8 04/29/2024    HCT 46.0 04/29/2024    MCV 95.0 (H) 04/29/2024    PLT 78 (L) 04/29/2024       Lab Results   Component Value Date    PSA 0.25

## 2024-07-19 DIAGNOSIS — F41.1 GAD (GENERALIZED ANXIETY DISORDER): ICD-10-CM

## 2024-07-22 RX ORDER — ESCITALOPRAM OXALATE 20 MG/1
20 TABLET ORAL DAILY
Qty: 90 TABLET | Refills: 3 | Status: SHIPPED | OUTPATIENT
Start: 2024-07-22

## 2024-07-22 NOTE — TELEPHONE ENCOUNTER
[facility] pharmacy is requesting refill request as they only have 3 boxes left and needs script to hold for patient.    5/2/2024 8/8/2024    Please advise.

## 2024-08-08 ENCOUNTER — OFFICE VISIT (OUTPATIENT)
Dept: FAMILY MEDICINE CLINIC | Age: 69
End: 2024-08-08

## 2024-08-08 VITALS
RESPIRATION RATE: 16 BRPM | HEIGHT: 71 IN | BODY MASS INDEX: 30.8 KG/M2 | HEART RATE: 86 BPM | SYSTOLIC BLOOD PRESSURE: 116 MMHG | WEIGHT: 220 LBS | OXYGEN SATURATION: 96 % | DIASTOLIC BLOOD PRESSURE: 62 MMHG

## 2024-08-08 DIAGNOSIS — D69.6 THROMBOCYTOPENIA, UNSPECIFIED (HCC): ICD-10-CM

## 2024-08-08 DIAGNOSIS — J30.1 SEASONAL ALLERGIC RHINITIS DUE TO POLLEN: ICD-10-CM

## 2024-08-08 DIAGNOSIS — F41.1 GAD (GENERALIZED ANXIETY DISORDER): ICD-10-CM

## 2024-08-08 DIAGNOSIS — E83.110 HEREDITARY HEMOCHROMATOSIS (HCC): ICD-10-CM

## 2024-08-08 DIAGNOSIS — I10 PRIMARY HYPERTENSION: ICD-10-CM

## 2024-08-08 DIAGNOSIS — M1A.00X0 IDIOPATHIC CHRONIC GOUT WITHOUT TOPHUS, UNSPECIFIED SITE: ICD-10-CM

## 2024-08-08 DIAGNOSIS — E11.9 TYPE 2 DIABETES MELLITUS WITHOUT COMPLICATION, WITHOUT LONG-TERM CURRENT USE OF INSULIN (HCC): Primary | ICD-10-CM

## 2024-08-08 DIAGNOSIS — E78.2 MIXED HYPERLIPIDEMIA: ICD-10-CM

## 2024-08-08 NOTE — PROGRESS NOTES
Date Time Provider Department Center   11/7/2024 10:00 AM Yamil Solomon MD [facility] ECC DEP   2/6/2025 10:00 AM Yamil Solomon MD [facility] DEP   5/8/2025  9:30 AM Yamil Solomon MD [facility] ECC DEP   8/7/2025  9:30 AM Yamil Solomon MD [facility] DEP         ASSESSMENT       Diagnosis Orders   1. Type 2 diabetes mellitus without complication, without long-term current use of insulin ([facility])  Comprehensive Metabolic Panel    Hemoglobin A1C      2. Primary hypertension  Comprehensive Metabolic Panel    Hemoglobin A1C      3. Mixed hyperlipidemia  Comprehensive Metabolic Panel    Hemoglobin A1C      4. CARSON (generalized anxiety disorder)        5. Thrombocytopenia, unspecified (HCC)        6. Hereditary hemochromatosis (HCC)        7. Seasonal allergic rhinitis due to pollen        8. Idiopathic chronic gout without tophus, unspecified site            PLAN      1. Type 2 diabetes mellitus without complication, without long-term current use of insulin ([facility])  A1c stable at 7.0 no change for now try to increase his exercise diet is quite good  - Comprehensive Metabolic Panel; Future  - Hemoglobin A1C; Future    2. Primary hypertension  Blood pressure perfectly at goal no change renal function electrolytes are normal  - Comprehensive Metabolic Panel; Future  - Hemoglobin A1C; Future    3. Mixed hyperlipidemia  Tolerating meds well no change  - Comprehensive Metabolic Panel; Future  - Hemoglobin A1C; Future    4. CARSON (generalized anxiety disorder)  Well-controlled with Lexapro continue    5. Thrombocytopenia, unspecified (HCC)  Platelets are stable    6. Hereditary hemochromatosis (HCC)  Continues to follow with hematology    7. Seasonal allergic rhinitis due to pollen  Stable no change    8. Idiopathic chronic gout without tophus, unspecified site  No recent gout attacks no change     Follow-up 3 months with labs      Preventive Health Topics:  Pt declines HIV and HEP C

## 2024-08-19 ENCOUNTER — OFFICE VISIT (OUTPATIENT)
Dept: FAMILY MEDICINE CLINIC | Age: 69
End: 2024-08-19
Payer: MEDICARE

## 2024-08-19 VITALS
RESPIRATION RATE: 22 BRPM | HEIGHT: 71 IN | WEIGHT: 215 LBS | BODY MASS INDEX: 30.1 KG/M2 | HEART RATE: 94 BPM | OXYGEN SATURATION: 96 % | DIASTOLIC BLOOD PRESSURE: 64 MMHG | SYSTOLIC BLOOD PRESSURE: 110 MMHG

## 2024-08-19 DIAGNOSIS — J01.00 ACUTE NON-RECURRENT MAXILLARY SINUSITIS: Primary | ICD-10-CM

## 2024-08-19 PROCEDURE — 99213 OFFICE O/P EST LOW 20 MIN: CPT | Performed by: NURSE PRACTITIONER

## 2024-08-19 PROCEDURE — 1123F ACP DISCUSS/DSCN MKR DOCD: CPT | Performed by: NURSE PRACTITIONER

## 2024-08-19 PROCEDURE — 3074F SYST BP LT 130 MM HG: CPT | Performed by: NURSE PRACTITIONER

## 2024-08-19 PROCEDURE — 3078F DIAST BP <80 MM HG: CPT | Performed by: NURSE PRACTITIONER

## 2024-08-19 RX ORDER — AMOXICILLIN AND CLAVULANATE POTASSIUM 875; 125 MG/1; MG/1
1 TABLET, FILM COATED ORAL 2 TIMES DAILY
Qty: 14 TABLET | Refills: 0 | Status: SHIPPED | OUTPATIENT
Start: 2024-08-19 | End: 2024-08-26

## 2024-08-19 ASSESSMENT — ENCOUNTER SYMPTOMS
SINUS PRESSURE: 1
SINUS PAIN: 1
SORE THROAT: 1
ABDOMINAL DISTENTION: 0
COUGH: 0
NAUSEA: 0
BACK PAIN: 0
SHORTNESS OF BREATH: 0
DIARRHEA: 0
VOMITING: 0
STRIDOR: 0
CHEST TIGHTNESS: 0
COLOR CHANGE: 0
WHEEZING: 0
ABDOMINAL PAIN: 0
CONSTIPATION: 0

## 2024-08-19 NOTE — PROGRESS NOTES
Clarence Troncoso (:  1955) is a 68 y.o. male,Established patient, here for evaluation of the following chief complaint(s):  Cough (For over a week; slowly getting better. But now is all in his sinuses and having a lot of facial pain ), Head Congestion, and Sinus Problem         Assessment & Plan  Acute non-recurrent maxillary sinusitis    Augmentin as prescribed.   Fluids.  Otc symptomatic relievers.   Tylenol for discomfort.           Return if symptoms worsen or fail to improve.       Subjective   HPI  Congestion and cough started a week ago.    Took home covid test and negative.   Now having sinus headache and a lot of sinus pressure.   No fevers.    Facial pain.  Nothing otc helping.    No sob or cp.   Nyquil and tylenol.  Not helping at all.     No fevers that he knows of.  Review of Systems   Constitutional:  Negative for activity change, appetite change, chills, diaphoresis, fatigue, fever and unexpected weight change.   HENT:  Positive for congestion, postnasal drip, sinus pressure, sinus pain and sore throat. Negative for ear pain.    Eyes:  Negative for visual disturbance.   Respiratory:  Negative for cough, chest tightness, shortness of breath, wheezing and stridor.    Cardiovascular:  Negative for chest pain, palpitations and leg swelling.   Gastrointestinal:  Negative for abdominal distention, abdominal pain, constipation, diarrhea, nausea and vomiting.   Endocrine: Negative for polydipsia, polyphagia and polyuria.   Genitourinary:  Negative for decreased urine volume, difficulty urinating, dysuria, flank pain, frequency, hematuria and urgency.   Musculoskeletal:  Negative for arthralgias, back pain, gait problem, joint swelling, myalgias and neck pain.   Skin:  Negative for color change, pallor and rash.   Neurological:  Negative for dizziness, syncope, weakness, light-headedness, numbness and headaches.   Hematological:  Negative for adenopathy.   Psychiatric/Behavioral:  Negative for

## 2024-10-31 ENCOUNTER — HOSPITAL ENCOUNTER (OUTPATIENT)
Age: 69
Discharge: HOME | End: 2024-10-31
Payer: MEDICARE

## 2024-10-31 DIAGNOSIS — I10 PRIMARY HYPERTENSION: ICD-10-CM

## 2024-10-31 DIAGNOSIS — E11.9 TYPE 2 DIABETES MELLITUS WITHOUT COMPLICATION, WITHOUT LONG-TERM CURRENT USE OF INSULIN (HCC): ICD-10-CM

## 2024-10-31 DIAGNOSIS — E78.2 MIXED HYPERLIPIDEMIA: ICD-10-CM

## 2024-10-31 LAB
ALBUMIN SERPL BCG-MCNC: 4 G/DL (ref 3.5–5.1)
ALP SERPL-CCNC: 145 U/L (ref 38–126)
ALT SERPL W/O P-5'-P-CCNC: 30 U/L (ref 11–66)
ANION GAP SERPL CALC-SCNC: 15 MEQ/L (ref 8–16)
AST SERPL-CCNC: 55 U/L (ref 5–40)
BASOPHILS ABSOLUTE: 0.1 THOU/MM3 (ref 0–0.1)
BASOPHILS NFR BLD AUTO: 1.6 %
BILIRUB CONJ SERPL-MCNC: 0.3 MG/DL (ref 0.1–13.8)
BILIRUB SERPL-MCNC: 0.8 MG/DL (ref 0.3–1.2)
BUN SERPL-MCNC: 12 MG/DL (ref 7–22)
CALCIUM SERPL-MCNC: 9.4 MG/DL (ref 8.5–10.5)
CHLORIDE SERPL-SCNC: 103 MEQ/L (ref 98–111)
CO2 SERPL-SCNC: 22 MEQ/L (ref 23–33)
CREAT SERPL-MCNC: 0.7 MG/DL (ref 0.4–1.2)
DEPRECATED MEAN GLUCOSE BLD GHB EST-ACNC: 174 MG/DL (ref 70–126)
DEPRECATED RDW RBC AUTO: 51.8 FL (ref 35–45)
EOSINOPHIL NFR BLD AUTO: 5.6 %
EOSINOPHILS ABSOLUTE: 0.2 THOU/MM3 (ref 0–0.4)
ERYTHROCYTE [DISTWIDTH] IN BLOOD BY AUTOMATED COUNT: 15.1 % (ref 11.5–14.5)
GFR SERPL CREATININE-BSD FRML MDRD: > 90 ML/MIN/1.73M2
GLUCOSE SERPL-MCNC: 167 MG/DL (ref 70–108)
HBA1C MFR BLD HPLC: 7.8 % (ref 4.4–6.4)
HCT VFR BLD AUTO: 44.5 % (ref 42–52)
HGB BLD-MCNC: 14.2 GM/DL (ref 14–18)
IMM GRANULOCYTES # BLD AUTO: 0.01 THOU/MM3 (ref 0–0.07)
IMM GRANULOCYTES NFR BLD AUTO: 0.3 %
INR BLD: 1.23 (ref 0.85–1.13)
LYMPHOCYTES ABSOLUTE: 0.4 THOU/MM3 (ref 1–4.8)
LYMPHOCYTES NFR BLD AUTO: 11.6 %
MCH RBC QN AUTO: 29.8 PG (ref 26–33)
MCHC RBC AUTO-ENTMCNC: 31.9 GM/DL (ref 32.2–35.5)
MCV RBC AUTO: 93.3 FL (ref 80–94)
MONOCYTES ABSOLUTE: 0.4 THOU/MM3 (ref 0.4–1.3)
MONOCYTES NFR BLD AUTO: 10.3 %
NEUTROPHILS ABSOLUTE: 2.7 THOU/MM3 (ref 1.8–7.7)
NEUTROPHILS NFR BLD AUTO: 70.6 %
NRBC BLD AUTO-RTO: 0 /100 WBC
PLATELET # BLD AUTO: 79 THOU/MM3 (ref 130–400)
PMV BLD AUTO: 12.6 FL (ref 9.4–12.4)
POTASSIUM SERPL-SCNC: 4.2 MEQ/L (ref 3.5–5.2)
PROT SERPL-MCNC: 7.5 G/DL (ref 6.1–8)
RBC # BLD AUTO: 4.77 MILL/MM3 (ref 4.7–6.1)
SCAN OF BLOOD SMEAR: NORMAL
SODIUM SERPL-SCNC: 140 MEQ/L (ref 135–145)
WBC # BLD AUTO: 3.8 THOU/MM3 (ref 4.8–10.8)

## 2024-10-31 PROCEDURE — 83036 HEMOGLOBIN GLYCOSYLATED A1C: CPT

## 2024-10-31 PROCEDURE — 36415 COLL VENOUS BLD VENIPUNCTURE: CPT

## 2024-10-31 PROCEDURE — 86301 IMMUNOASSAY TUMOR CA 19-9: CPT

## 2024-10-31 PROCEDURE — 80053 COMPREHEN METABOLIC PANEL: CPT

## 2024-10-31 PROCEDURE — 85610 PROTHROMBIN TIME: CPT

## 2024-10-31 PROCEDURE — 85025 COMPLETE CBC W/AUTO DIFF WBC: CPT

## 2024-10-31 PROCEDURE — 82248 BILIRUBIN DIRECT: CPT

## 2024-10-31 PROCEDURE — 82105 ALPHA-FETOPROTEIN SERUM: CPT

## 2024-11-01 LAB
AFP SERPL-MCNC: 424 UG/L
CANCER AG19-9 SERPL-ACNC: 65 U/ML (ref 0–35)

## 2024-11-01 NOTE — TELEPHONE ENCOUNTER
This medication refill is regarding a fax request.  Refill requested by  the pharmacy .    Requested Prescriptions     Pending Prescriptions Disp Refills    metFORMIN (GLUCOPHAGE) 1000 MG tablet 180 tablet 3     Sig: Take 1 tablet by mouth 2 times daily (with meals)       Date of last visit: 8/19/2024  Date of next visit: 11/7/2024  Date of last refill: 12/12/2023  Pharmacy Name: [facility] pharmacy    Last Lipid Panel:    Lab Results   Component Value Date/Time    CHOL 184 06/29/2022 07:10 AM    TRIG 180 06/29/2022 07:10 AM    HDL 51 10/06/2023 08:19 AM     Last CMP:   Lab Results   Component Value Date     10/31/2024    K 4.2 10/31/2024     10/31/2024    CO2 22 (L) 10/31/2024    BUN 12 10/31/2024    CREATININE 0.7 10/31/2024    GLUCOSE 167 (H) 10/31/2024    CALCIUM 9.4 10/31/2024    BILITOT 0.8 10/31/2024    ALKPHOS 145 (H) 10/31/2024    AST 55 (H) 10/31/2024    ALT 30 10/31/2024    LABGLOM > 90 10/31/2024       Last Thyroid:    Lab Results   Component Value Date    TSH 3.840 10/06/2023     Last Hemoglobin A1C:    Lab Results   Component Value Date/Time    LABA1C 7.8 10/31/2024 08:01 AM       Rx verified, ordered and set to EP.

## 2024-11-07 ENCOUNTER — TELEPHONE (OUTPATIENT)
Dept: FAMILY MEDICINE CLINIC | Age: 69
End: 2024-11-07

## 2024-11-07 ENCOUNTER — OFFICE VISIT (OUTPATIENT)
Dept: FAMILY MEDICINE CLINIC | Age: 69
End: 2024-11-07

## 2024-11-07 VITALS
OXYGEN SATURATION: 94 % | HEART RATE: 94 BPM | HEIGHT: 71 IN | DIASTOLIC BLOOD PRESSURE: 66 MMHG | BODY MASS INDEX: 30.46 KG/M2 | WEIGHT: 217.6 LBS | SYSTOLIC BLOOD PRESSURE: 128 MMHG

## 2024-11-07 DIAGNOSIS — F41.1 GAD (GENERALIZED ANXIETY DISORDER): ICD-10-CM

## 2024-11-07 DIAGNOSIS — Z12.5 PROSTATE CANCER SCREENING: ICD-10-CM

## 2024-11-07 DIAGNOSIS — E11.9 TYPE 2 DIABETES MELLITUS WITHOUT COMPLICATION, WITHOUT LONG-TERM CURRENT USE OF INSULIN (HCC): ICD-10-CM

## 2024-11-07 DIAGNOSIS — D69.6 THROMBOCYTOPENIA, UNSPECIFIED (HCC): ICD-10-CM

## 2024-11-07 DIAGNOSIS — M1A.00X0 IDIOPATHIC CHRONIC GOUT WITHOUT TOPHUS, UNSPECIFIED SITE: Primary | ICD-10-CM

## 2024-11-07 DIAGNOSIS — E83.110 HEREDITARY HEMOCHROMATOSIS (HCC): ICD-10-CM

## 2024-11-07 DIAGNOSIS — E78.2 MIXED HYPERLIPIDEMIA: ICD-10-CM

## 2024-11-07 DIAGNOSIS — I10 PRIMARY HYPERTENSION: ICD-10-CM

## 2024-11-07 DIAGNOSIS — J30.1 SEASONAL ALLERGIC RHINITIS DUE TO POLLEN: ICD-10-CM

## 2024-11-07 RX ORDER — DULAGLUTIDE 4.5 MG/.5ML
4.5 INJECTION, SOLUTION SUBCUTANEOUS WEEKLY
Qty: 2 ML | Refills: 5 | Status: SHIPPED | OUTPATIENT
Start: 2024-11-07

## 2024-11-07 RX ORDER — ALLOPURINOL 300 MG/1
300 TABLET ORAL DAILY
Qty: 90 TABLET | Refills: 3 | Status: SHIPPED | OUTPATIENT
Start: 2024-11-07

## 2024-11-07 NOTE — TELEPHONE ENCOUNTER
No PA required for Dulaglutide (TRULICITY) 4.5 MG/0.5ML SOAJ per in box covermymeds  
Oriented - self; Oriented - place; Oriented - time

## 2024-11-07 NOTE — PROGRESS NOTES
[place]   2/6/2025 10:00 AM Yamil Solomon MD [facility] DEP   5/8/2025  9:30 AM Yamil Solomon MD [facility]   8/7/2025  9:30 AM Yamil Solomon MD [facility] DEP         ASSESSMENT       Diagnosis Orders   1. Idiopathic chronic gout without tophus, unspecified site  allopurinol (ZYLOPRIM) 300 MG tablet      2. Type 2 diabetes mellitus without complication, without long-term current use of insulin (HCC)  Dulaglutide (TRULICITY) 4.5 MG/0.5ML SOAJ    Comprehensive Metabolic Panel    Hemoglobin A1C    Microalbumin / Creatinine Urine Ratio      3. Primary hypertension  Comprehensive Metabolic Panel      4. Seasonal allergic rhinitis due to pollen        5. Thrombocytopenia, unspecified (HCC)  CBC with Auto Differential      6. CARSON (generalized anxiety disorder)        7. Hereditary hemochromatosis (HCC)        8. Mixed hyperlipidemia        9. Prostate cancer screening  PSA Screening          PLAN      Assessment & Plan  1. Diabetes Mellitus.  His A1c has increased to 7.8, up from 6.5 in January and 7 in April. This could be due to his recent sinus infection and the stress associated with his cancer diagnosis. His current medications include Farxiga 10 mg and Trulicity 3 mg. The dose of Trulicity will be increased to 4.5 mg. A 1-month supply will be provided initially to monitor his response. If he tolerates the increased dose well, a prescription for 1.5 mg will be added to his regimen. Blood work will be conducted in 3 months to assess his progress. If the increased dose of Trulicity does not yield the desired results, alternative treatment options will be considered. He is advised to maintain a healthy diet and regular exercise.    2. Hepatocellular Carcinoma.  His recent CT scan of the chest showed no evidence of disease, and the MRI of the abdomen indicated that the lesions are currently nonviable. His hemoglobin and platelet counts are stable, and his liver inflammation is

## 2024-11-11 NOTE — TELEPHONE ENCOUNTER
This medication refill is regarding a telephone request.  Refill requested by  [facility] .    Requested Prescriptions     Pending Prescriptions Disp Refills    metFORMIN (GLUCOPHAGE) 1000 MG tablet 180 tablet 3     Sig: Take 1 tablet by mouth 2 times daily (with meals)       Date of last visit: 11/7/2024  Date of next visit: 2/6/2025  Date of last refill: 11/01/024  Pharmacy Name: [facility]    Last Lipid Panel:    Lab Results   Component Value Date/Time    CHOL 184 06/29/2022 07:10 AM    TRIG 180 06/29/2022 07:10 AM    HDL 51 10/06/2023 08:19 AM     Last CMP:   Lab Results   Component Value Date     10/31/2024    K 4.2 10/31/2024     10/31/2024    CO2 22 (L) 10/31/2024    BUN 12 10/31/2024    CREATININE 0.7 10/31/2024    GLUCOSE 167 (H) 10/31/2024    CALCIUM 9.4 10/31/2024    BILITOT 0.8 10/31/2024    ALKPHOS 145 (H) 10/31/2024    AST 55 (H) 10/31/2024    ALT 30 10/31/2024    LABGLOM > 90 10/31/2024       Last Thyroid:    Lab Results   Component Value Date    TSH 3.840 10/06/2023     Last Hemoglobin A1C:    Lab Results   Component Value Date/Time    LABA1C 7.8 10/31/2024 08:01 AM       Rx verified, ordered and set to EP.

## 2024-12-30 ENCOUNTER — NURSE ONLY (OUTPATIENT)
Dept: FAMILY MEDICINE CLINIC | Age: 69
End: 2024-12-30

## 2024-12-30 VITALS
HEIGHT: 71 IN | BODY MASS INDEX: 30.24 KG/M2 | RESPIRATION RATE: 16 BRPM | OXYGEN SATURATION: 98 % | WEIGHT: 216 LBS | SYSTOLIC BLOOD PRESSURE: 126 MMHG | DIASTOLIC BLOOD PRESSURE: 64 MMHG | TEMPERATURE: 98.8 F | HEART RATE: 89 BPM

## 2024-12-30 DIAGNOSIS — M79.10 MUSCLE ACHE: ICD-10-CM

## 2024-12-30 DIAGNOSIS — R50.9 FEVER, UNSPECIFIED FEVER CAUSE: Primary | ICD-10-CM

## 2024-12-30 DIAGNOSIS — J01.00 ACUTE NON-RECURRENT MAXILLARY SINUSITIS: ICD-10-CM

## 2024-12-30 DIAGNOSIS — R05.1 ACUTE COUGH: ICD-10-CM

## 2024-12-30 LAB
INFLUENZA VIRUS A RNA: NEGATIVE
INFLUENZA VIRUS B RNA: NEGATIVE
Lab: NORMAL
QC PASS/FAIL: NORMAL
SARS-COV-2 RDRP RESP QL NAA+PROBE: NEGATIVE

## 2024-12-30 ASSESSMENT — ENCOUNTER SYMPTOMS
SINUS PAIN: 0
SHORTNESS OF BREATH: 0
SINUS PRESSURE: 1
SORE THROAT: 0
WHEEZING: 0
COUGH: 1

## 2024-12-30 NOTE — PROGRESS NOTES
Attending Supervising Physician’s Attestation Statement   I discussed the findings and plans with resident physician and agree as documented in her note     Electronically signed by Jennifer Musa MD on 12/30/24 at 3:56 PM EST

## 2025-01-02 ENCOUNTER — TELEPHONE (OUTPATIENT)
Dept: FAMILY MEDICINE CLINIC | Age: 70
End: 2025-01-02

## 2025-01-02 NOTE — TELEPHONE ENCOUNTER
Patient called and left message stating that he was seen on Monday and treated with Augmentin. Pt took 3 doses and started with \"horrible\" diarrhea. He did stop the medication. He has contacted his provider at [facility] and they are going to address and possibly change medication.

## 2025-01-06 ENCOUNTER — HOSPITAL ENCOUNTER (EMERGENCY)
Age: 70
Discharge: HOME | End: 2025-01-06
Attending: FAMILY MEDICINE
Payer: MEDICARE

## 2025-01-06 VITALS
OXYGEN SATURATION: 95 % | TEMPERATURE: 97.7 F | RESPIRATION RATE: 16 BRPM | SYSTOLIC BLOOD PRESSURE: 132 MMHG | HEART RATE: 75 BPM | DIASTOLIC BLOOD PRESSURE: 76 MMHG | WEIGHT: 208 LBS | BODY MASS INDEX: 29.12 KG/M2 | HEIGHT: 71 IN

## 2025-01-06 DIAGNOSIS — R19.7 DIARRHEA, UNSPECIFIED TYPE: Primary | ICD-10-CM

## 2025-01-06 LAB
ALBUMIN SERPL BCP-MCNC: 2.8 GM/DL (ref 3.4–5)
ALP SERPL-CCNC: 232 U/L (ref 46–116)
ALT SERPL W P-5'-P-CCNC: 50 U/L (ref 14–63)
ANION GAP SERPL CALC-SCNC: 11 MEQ/L (ref 8–16)
AST SERPL W P-5'-P-CCNC: 97 U/L (ref 15–37)
BILIRUB SERPL-MCNC: 1.1 MG/DL (ref 0.2–1)
BUN SERPL-MCNC: 8 MG/DL (ref 7–18)
C DIFF GDH STL QL: NEGATIVE
CALCIUM SERPL-MCNC: 8.5 MG/DL (ref 8.5–10.1)
CHLORIDE SERPL-SCNC: 100 MEQ/L (ref 98–107)
CO2 SERPL-SCNC: 22 MEQ/L (ref 21–32)
CREAT SERPL-MCNC: 0.9 MG/DL (ref 0.6–1.3)
GFR SERPL CREATININE-BSD FRML MDRD: > 90 ML/MIN/1.73M2
GLUCOSE SERPL-MCNC: 178 MG/DL (ref 74–106)
INR BLD: 1.35 (ref 0.85–1.13)
POTASSIUM SERPL-SCNC: 3.5 MEQ/L (ref 3.5–5.1)
PROT SERPL-MCNC: 6.8 GM/DL (ref 6.4–8.2)
SODIUM SERPL-SCNC: 133 MEQ/L (ref 136–145)
TROPONIN, HIGH SENSITIVITY: 4.5 PG/ML (ref 0–76.1)

## 2025-01-06 PROCEDURE — 36415 COLL VENOUS BLD VENIPUNCTURE: CPT

## 2025-01-06 PROCEDURE — 80053 COMPREHEN METABOLIC PANEL: CPT

## 2025-01-06 PROCEDURE — 99284 EMERGENCY DEPT VISIT MOD MDM: CPT

## 2025-01-06 PROCEDURE — 85025 COMPLETE CBC W/AUTO DIFF WBC: CPT

## 2025-01-06 PROCEDURE — 84484 ASSAY OF TROPONIN QUANT: CPT

## 2025-01-06 PROCEDURE — 2580000003 HC RX 258: Performed by: FAMILY MEDICINE

## 2025-01-06 PROCEDURE — 87449 NOS EACH ORGANISM AG IA: CPT

## 2025-01-06 PROCEDURE — 85610 PROTHROMBIN TIME: CPT

## 2025-01-06 RX ORDER — 0.9 % SODIUM CHLORIDE 0.9 %
1000 INTRAVENOUS SOLUTION INTRAVENOUS ONCE
Status: COMPLETED | OUTPATIENT
Start: 2025-01-06 | End: 2025-01-06

## 2025-01-06 RX ADMIN — SODIUM CHLORIDE 1000 ML: 9 INJECTION, SOLUTION INTRAVENOUS at 16:08

## 2025-01-06 ASSESSMENT — PAIN - FUNCTIONAL ASSESSMENT: PAIN_FUNCTIONAL_ASSESSMENT: NONE - DENIES PAIN

## 2025-01-06 NOTE — ED NOTES
Pt complains of having 5 loose stools today and having dark stools. Pt was just discharged from [facility] yesterday. Pt denies fever, nausea or vomiting. Pt alert, resp even and unlabored, skin pink, warm and dry. Pt denies feeling lightheaded or dizzy.

## 2025-01-06 NOTE — DISCHARGE INSTR - COC
Continuity of Care Form    Patient Name: Clarence Troncoso   :  1955  MRN:  [id number]    Admit date:  2025  Discharge date:  ***    Code Status Order: Prior   Advance Directives:   Advance Care Flowsheet Documentation             Admitting Physician:  No admitting provider for patient encounter.  PCP: Yamil Solomon MD    Discharging Nurse: ***  Discharging Hospital Unit/Room#: E5/E5  Discharging Unit Phone Number: ***    Emergency Contact:   Extended Emergency Contact Information  Primary Emergency Contact: Blanca Troncoso  Address: [address] [place] [id number] [facility]  Home Phone: [phone]  Mobile Phone: [phone]  Relation: Spouse    Past Surgical History:  Past Surgical History:   Procedure Laterality Date    BACK SURGERY      CERVICAL FUSION      Dr Rubio    COLONOSCOPY  2015    Dr Yo    ENDOSCOPY, COLON, DIAGNOSTIC      ESOPHAGEAL VARICE LIGATION      UPPER GASTROINTESTINAL ENDOSCOPY  2017    Dr Yo    UPPER GASTROINTESTINAL ENDOSCOPY N/A 2020    EGD CONTROL HEMORRHAGE performed by Evaristo Stewart MD at [facility]    UPPER GASTROINTESTINAL ENDOSCOPY Left 2020    EGD BANDING performed by Ho Yo MD at [facility]    UPPER GASTROINTESTINAL ENDOSCOPY Left 2020    EGD BAND LIGATION performed by Ho Yo MD at [facility]    UPPER GASTROINTESTINAL ENDOSCOPY N/A 3/26/2021    EGD BIOPSY performed by Danii Perez MD at [facility]    UPPER GASTROINTESTINAL ENDOSCOPY  3/26/2021    EGD BAND LIGATION performed by Danii Perez MD at [facility]    UPPER GASTROINTESTINAL ENDOSCOPY N/A 2021    EGD BAND LIGATION performed by Ho Yo MD at [facility]    UPPER GASTROINTESTINAL ENDOSCOPY Left 2021    EGD BIOPSY performed by Ho Yo MD at [facility]       Immunization History:   Immunization History   Administered Date(s) Administered    COVID-19, PFIZER Bivalent, DO NOT

## 2025-01-06 NOTE — ED NOTES
Pt resting, resp even and unlabored, skin pale, warm and dry. No bowel movements since arrival. IV infused, IV discontinued at this time. Pt given discharge instructions and verbalizes understanding, pt released.

## 2025-01-06 NOTE — DISCHARGE INSTRUCTIONS
Clarence Troncoso,    It has been my absolute pleasure to serve you while in the emergency department at [facility] today.  Please do remember to take all medications as prescribed.  If you can give a stool sample today, please bring it by. If you cannot, you can bring it tomorrow.  Please make sure you follow-up with your PCP within 7 days.  Please follow-up with any and all specialists as we discussed.  Please return to the ER in case of any worsening of symptoms.  I wish you a speedy recovery!    Sincerely,    Dr. Messi Murphy MD.

## 2025-01-06 NOTE — ED PROVIDER NOTES
[facility]  eMERGENCY dEPARTMENT eNCOUnter          CHIEF COMPLAINT       Chief Complaint   Patient presents with    Diarrhea       Nurses Notes reviewed and I agree except as noted in the HPI.    HISTORY OF PRESENT ILLNESS    Clarence Troncoso is a 69 y.o. male who presents to the Emergency Department for the evaluation of diarrhea.  Patient reports that since earlier today he started developing dark loose stools.  He reports that he has a history of liver cancer and he follows with [facility].  He recently started immunotherapy.  On 12/30/2024 he started developing a fever.  He was put on Augmentin by PCP.  Fever progressed.  He was then admitted to [facility] for neutropenic fever on 1/2/2024.  He has a history of alcohol abuse but abstinent since 2020 and hemochromatosis cirrhosis along with variceal bleeding status post EGD surveillance by GI.  He was admitted to the hospital and given IV antibiotics.  Fever resolved on 1/3/2024.  There is no evidence of pneumonia UTI.  Blood cultures were negative.  Ultrasound abdomen showed trace ascites.  Abdominal exam was benign.  SBP was ruled out.  Patient was no longer neutropenic.  He was discharged home on oral Augmentin.  He denies any chest pain shortness of breath palpitations fevers chills nausea vomiting headache dizziness numbness tingling.  Denies any blood thinners      The HPI was provided by the patient.     REVIEW OF SYSTEMS       Eyes: no vision changes  Ears, Nose, Mouth, Throat: no hearing disturbance, no nasal swelling, no epistaxis, no difficulty swallowing  Cardiovascular: no chest pains  Respiratory: no SOB, no cough  Gastrointestinal: no abdominal pain, no nausea, no vomiting, no diarrhea  Genitourinary: no change in urinary frequency, no dysuria symptoms  Musculoskeletal: no joint pains, no muscle pains  Integumentary (skin and/or breast): no rashes, no swelling, no redness  Neurological: no weakness, no facial droop, no

## 2025-01-07 LAB
BASOPHILS # BLD: 1.2 % (ref 0–3)
BASOPHILS ABSOLUTE: 0 THOU/MM3 (ref 0–0.1)
DIFFERENTIAL, AUTO: ABNORMAL
EOSINOPHILS ABSOLUTE: 0.2 THOU/MM3 (ref 0–0.5)
EOSINOPHILS RELATIVE PERCENT: 6.2 % (ref 0–4)
HCT VFR BLD CALC: 37.9 % (ref 42–52)
HEMOGLOBIN: 12.3 GM/DL (ref 14–18)
IMMATURE GRANS (ABS): 0.03 THOU/MM3 (ref 0–0.07)
IMMATURE GRANULOCYTES %: 1 %
LYMPHOCYTES # BLD AUTO: 22.9 % (ref 15–47)
LYMPHOCYTES ABSOLUTE: 0.8 THOU/MM3 (ref 1–4.8)
MCH RBC QN AUTO: 29.9 PG (ref 26–32)
MCHC RBC AUTO-ENTMCNC: 32.5 GM/DL (ref 31–35)
MCV RBC AUTO: 92.2 FL (ref 80–94)
MONOCYTES: 0.3 THOU/MM3 (ref 0.3–1.3)
MONOCYTES: 9.4 % (ref 0–12)
NEUTROPHILS ABSOLUTE: 2 THOU/MM3 (ref 1.8–7.7)
PATHOLOGIST REVIEW: ABNORMAL
PDW BLD-RTO: 15.9 % (ref 11.5–14.9)
PLATELET # BLD AUTO: 66 THOU/MM3 (ref 130–400)
PMV BLD AUTO: 12.6 FL (ref 9.4–12.4)
RBC # BLD: 4.11 MILL/MM3 (ref 4.5–6.1)
SEG NEUTROPHILS: 59.7 % (ref 43–75)
WBC # BLD: 3.4 THOU/MM3 (ref 4.8–10.8)

## 2025-01-09 ENCOUNTER — CARE COORDINATION (OUTPATIENT)
Dept: CARE COORDINATION | Age: 70
End: 2025-01-09

## 2025-01-09 SDOH — SOCIAL STABILITY: SOCIAL NETWORK: ARE YOU MARRIED, WIDOWED, DIVORCED, SEPARATED, NEVER MARRIED, OR LIVING WITH A PARTNER?: MARRIED

## 2025-01-09 SDOH — ECONOMIC STABILITY: INCOME INSECURITY: HOW HARD IS IT FOR YOU TO PAY FOR THE VERY BASICS LIKE FOOD, HOUSING, MEDICAL CARE, AND HEATING?: NOT HARD AT ALL

## 2025-01-09 SDOH — SOCIAL STABILITY: SOCIAL NETWORK: HOW OFTEN DO YOU ATTEND CHURCH OR RELIGIOUS SERVICES?: MORE THAN 4 TIMES PER YEAR

## 2025-01-09 SDOH — ECONOMIC STABILITY: INCOME INSECURITY: IN THE LAST 12 MONTHS, WAS THERE A TIME WHEN YOU WERE NOT ABLE TO PAY THE MORTGAGE OR RENT ON TIME?: NO

## 2025-01-09 SDOH — ECONOMIC STABILITY: TRANSPORTATION INSECURITY
IN THE PAST 12 MONTHS, HAS THE LACK OF TRANSPORTATION KEPT YOU FROM MEDICAL APPOINTMENTS OR FROM GETTING MEDICATIONS?: NO

## 2025-01-09 SDOH — ECONOMIC STABILITY: FOOD INSECURITY: WITHIN THE PAST 12 MONTHS, THE FOOD YOU BOUGHT JUST DIDN'T LAST AND YOU DIDN'T HAVE MONEY TO GET MORE.: NEVER TRUE

## 2025-01-09 SDOH — SOCIAL STABILITY: SOCIAL NETWORK
DO YOU BELONG TO ANY CLUBS OR ORGANIZATIONS SUCH AS CHURCH GROUPS UNIONS, FRATERNAL OR ATHLETIC GROUPS, OR SCHOOL GROUPS?: YES

## 2025-01-09 SDOH — SOCIAL STABILITY: SOCIAL NETWORK: HOW OFTEN DO YOU ATTENT MEETINGS OF THE CLUB OR ORGANIZATION YOU BELONG TO?: MORE THAN 4 TIMES PER YEAR

## 2025-01-09 SDOH — HEALTH STABILITY: MENTAL HEALTH
STRESS IS WHEN SOMEONE FEELS TENSE, NERVOUS, ANXIOUS, OR CAN'T SLEEP AT NIGHT BECAUSE THEIR MIND IS TROUBLED. HOW STRESSED ARE YOU?: ONLY A LITTLE

## 2025-01-09 SDOH — HEALTH STABILITY: MENTAL HEALTH: HOW OFTEN DO YOU HAVE A DRINK CONTAINING ALCOHOL?: NEVER

## 2025-01-09 SDOH — ECONOMIC STABILITY: FOOD INSECURITY: WITHIN THE PAST 12 MONTHS, YOU WORRIED THAT YOUR FOOD WOULD RUN OUT BEFORE YOU GOT MONEY TO BUY MORE.: NEVER TRUE

## 2025-01-09 SDOH — HEALTH STABILITY: MENTAL HEALTH: HOW MANY STANDARD DRINKS CONTAINING ALCOHOL DO YOU HAVE ON A TYPICAL DAY?: PATIENT DOES NOT DRINK

## 2025-01-09 SDOH — SOCIAL STABILITY: SOCIAL NETWORK: HOW OFTEN DO YOU GET TOGETHER WITH FRIENDS OR RELATIVES?: MORE THAN THREE TIMES A WEEK

## 2025-01-09 SDOH — SOCIAL STABILITY: SOCIAL NETWORK
IN A TYPICAL WEEK, HOW MANY TIMES DO YOU TALK ON THE PHONE WITH FAMILY, FRIENDS, OR NEIGHBORS?: MORE THAN THREE TIMES A WEEK

## 2025-01-09 NOTE — CARE COORDINATION
Never   Financial Resource Strain: Low Risk  (1/9/2025)    Overall Financial Resource Strain (CARDIA)     Difficulty of Paying Living Expenses: Not hard at all   Food Insecurity: No Food Insecurity (1/9/2025)    Hunger Vital Sign     Worried About Running Out of Food in the Last Year: Never true     Ran Out of Food in the Last Year: Never true   Transportation Needs: No Transportation Needs (1/9/2025)    PRAPARE - Transportation     Lack of Transportation (Medical): No     Lack of Transportation (Non-Medical): No   Physical Activity: Sufficiently Active (1/29/2024)    Exercise Vital Sign     Days of Exercise per Week: 6 days     Minutes of Exercise per Session: 50 min   Stress: No Stress Concern Present (1/9/2025)    [employer] - Occupational Stress Questionnaire     Feeling of Stress : Only a little   Social Connections: Socially Integrated (1/9/2025)    Social Connection and Isolation Panel [NHANES]     Frequency of Communication with Friends and Family: More than three times a week     Frequency of Social Gatherings with Friends and Family: More than three times a week     Attends Gnosticist Services: More than 4 times per year     Active Member of Clubs or Organizations: Yes     Attends Club or Organization Meetings: More than 4 times per year     Marital Status:    Intimate Partner Violence: Not on file   Depression: Low Risk  (12/23/2024)    Received from [facility]    Depression     PHQ-9 Total Score (Interpretation of Total Score 1-4 = Minimal depression; 5-9 = Mild depression; 10-14 = Moderate depression; 15-19 = Moderately severe depression): 0   Housing Stability: Unknown (1/9/2025)    Housing Stability Vital Sign     Unable to Pay for Housing in the Last Year: No     Number of Times Moved in the Last Year: Not on file     Homeless in the Last Year: No   Interpersonal Safety: Not on file   Utilities: Not At Risk (1/9/2025)    [facility]

## 2025-01-16 ENCOUNTER — CARE COORDINATION (OUTPATIENT)
Dept: CARE COORDINATION | Age: 70
End: 2025-01-16

## 2025-01-16 SDOH — HEALTH STABILITY: PHYSICAL HEALTH: ON AVERAGE, HOW MANY DAYS PER WEEK DO YOU ENGAGE IN MODERATE TO STRENUOUS EXERCISE (LIKE A BRISK WALK)?: 4 DAYS

## 2025-01-16 SDOH — HEALTH STABILITY: PHYSICAL HEALTH: ON AVERAGE, HOW MANY MINUTES DO YOU ENGAGE IN EXERCISE AT THIS LEVEL?: 50 MIN

## 2025-01-16 NOTE — CARE COORDINATION
Ambulatory Care Coordination Note     2025 12:01 PM     Patient Current Location:  [place]     ACM contacted the patient by telephone. Verified name and  with patient as identifiers.         ACM: Nia Hoff RN     Challenges to be reviewed by the provider   Additional needs identified to be addressed with provider No    none         Method of communication with provider: none.    Utilization: Patient has not had any utilization since our last call.     Care Summary Note: Patient was called for continued Care Coordination follow up and education re: the management of his Hypertension, DM, liver disease, and healthcare needs.  Patient shared he is feeling better and has been able to increase his activity level.  Patient denied any further complaints of diarrhea being present.  Patient admits to not monitoring BS as frequently but stated they have been on average in the 140-145 range with lowest reading being around 130 and highest reading being around 180.  Importance of keeping BS controlled and DM managed as well as what to report and when to follow up with providers was also reviewed with patient and patient acknowledged understanding.  ACM educated patient on DM zone information and importance of routine exercise and diet adherence to assist with BS control and DM management.  Patient verbalized understanding and denied any need for additional diet resources at this time.  Patient is aware of upcoming PCP appointment and he denied any other questions, concerns, or needs.  Patient was instructed to call with any that may develop and he verbalized understanding.     Offered patient enrollment in the Remote Patient Monitoring (RPM) program for in-home monitoring: Yes, but did not enroll at this time: controlled chronic disease management.     Assessments Completed:   Care Coordination Interventions    Referral from Primary Care Provider: No  Suggested Interventions and Community Resources  Diabetes

## 2025-01-22 ENCOUNTER — CARE COORDINATION (OUTPATIENT)
Dept: CARE COORDINATION | Age: 70
End: 2025-01-22

## 2025-01-22 NOTE — CARE COORDINATION
Ambulatory Care Coordination Note  2025    Patient Current Location:  [address] [place] [id number]     [facility] contacted the patient by telephone. Verified name and  with patient as identifiers. Provided introduction to self, and explanation of the [facility] role.     Challenges to be reviewed by the provider   Additional needs identified to be addressed with provider: No  none               Method of communication with provider: none.    I spoke with the patient for continued Care Coordination follow up and education.  Patient is currently at [facility] receiving a treatment.  BS this AM was 139.  Importance of keeping BS controlled and DM managed as well as what to report and when to follow up with providers was also reviewed with patient and patient acknowledged understanding.  Patient denied any symptoms of hypo or hyperglycemia.  Diabetic diet and importance of diet adherence reviewed with patient.  Patient was also encouraged to work to remain active and reviewed how this also helps with BS control.  Educated on how to identify sx's that are worse than the baseline and the importance of early symptom recognition and reporting to prevent exacerbation which may lead to ED visits and hospital admissions.  I advised patient to contact PCP office if needed.  No further needs at this time.  Confirmed upcoming PCP appointment.           Lab Results       None            Care Coordination Interventions    Referral from Primary Care Provider: No  Suggested Interventions and Community Resources  Diabetes Education: Completed (Comment: 2025)  Disease Specific Clinic: Declined (Comment: 2025)  Disease Association: Completed (Comment: Aware of [place] CAP prog - 2025)  Medication Assistance Program: Declined (Comment: Educated to call with changes - 2025)  Medi Set or Pill Pack: Completed (Comment: 2025)  Pharmacist: Declined (Comment: 2025)  Registered Dietician: Declined (Comment:

## 2025-01-29 ENCOUNTER — HOSPITAL ENCOUNTER (OUTPATIENT)
Age: 70
Discharge: HOME | End: 2025-01-29
Payer: MEDICARE

## 2025-01-29 DIAGNOSIS — D69.6 THROMBOCYTOPENIA, UNSPECIFIED (HCC): ICD-10-CM

## 2025-01-29 DIAGNOSIS — I10 PRIMARY HYPERTENSION: ICD-10-CM

## 2025-01-29 DIAGNOSIS — E11.9 TYPE 2 DIABETES MELLITUS WITHOUT COMPLICATION, WITHOUT LONG-TERM CURRENT USE OF INSULIN (HCC): ICD-10-CM

## 2025-01-29 DIAGNOSIS — Z12.5 PROSTATE CANCER SCREENING: ICD-10-CM

## 2025-01-29 LAB
ALBUMIN SERPL BCG-MCNC: 3.5 G/DL (ref 3.5–5.1)
ALP SERPL-CCNC: 206 U/L (ref 38–126)
ALT SERPL W/O P-5'-P-CCNC: 33 U/L (ref 11–66)
ANION GAP SERPL CALC-SCNC: 13 MEQ/L (ref 8–16)
AST SERPL-CCNC: 99 U/L (ref 5–40)
BASOPHILS ABSOLUTE: 0 THOU/MM3 (ref 0–0.1)
BASOPHILS NFR BLD AUTO: 0.8 %
BILIRUB CONJ SERPL-MCNC: 0.8 MG/DL (ref 0.1–13.8)
BILIRUB SERPL-MCNC: 1.7 MG/DL (ref 0.3–1.2)
BUN SERPL-MCNC: 11 MG/DL (ref 7–22)
CALCIUM SERPL-MCNC: 8.8 MG/DL (ref 8.5–10.5)
CHLORIDE SERPL-SCNC: 98 MEQ/L (ref 98–111)
CO2 SERPL-SCNC: 20 MEQ/L (ref 23–33)
CREAT SERPL-MCNC: 0.7 MG/DL (ref 0.4–1.2)
CREAT UR-MCNC: 133.2 MG/DL
DEPRECATED MEAN GLUCOSE BLD GHB EST-ACNC: 135 MG/DL (ref 70–126)
DEPRECATED RDW RBC AUTO: 56.3 FL (ref 35–45)
EOSINOPHIL NFR BLD AUTO: 2.1 %
EOSINOPHILS ABSOLUTE: 0.1 THOU/MM3 (ref 0–0.4)
ERYTHROCYTE [DISTWIDTH] IN BLOOD BY AUTOMATED COUNT: 16.6 % (ref 11.5–14.5)
GFR SERPL CREATININE-BSD FRML MDRD: > 90 ML/MIN/1.73M2
GLUCOSE SERPL-MCNC: 151 MG/DL (ref 70–108)
HBA1C MFR BLD HPLC: 6.5 % (ref 4.4–6.4)
HCT VFR BLD AUTO: 43.2 % (ref 42–52)
HGB BLD-MCNC: 14.1 GM/DL (ref 14–18)
IMM GRANULOCYTES # BLD AUTO: 0.03 THOU/MM3 (ref 0–0.07)
IMM GRANULOCYTES NFR BLD AUTO: 0.5 %
LYMPHOCYTES ABSOLUTE: 0.7 THOU/MM3 (ref 1–4.8)
LYMPHOCYTES NFR BLD AUTO: 10.9 %
MCH RBC QN AUTO: 30.3 PG (ref 26–33)
MCHC RBC AUTO-ENTMCNC: 32.6 GM/DL (ref 32.2–35.5)
MCV RBC AUTO: 92.7 FL (ref 80–94)
MICROALBUMIN UR-MCNC: < 1.2 MG/DL
MICROALBUMIN/CREAT RATIO PNL UR: 9 MG/G (ref 0–30)
MONOCYTES ABSOLUTE: 0.9 THOU/MM3 (ref 0.4–1.3)
MONOCYTES NFR BLD AUTO: 15 %
NEUTROPHILS ABSOLUTE: 4.3 THOU/MM3 (ref 1.8–7.7)
NEUTROPHILS NFR BLD AUTO: 70.7 %
NRBC BLD AUTO-RTO: 0 /100 WBC
PLATELET # BLD AUTO: 107 THOU/MM3 (ref 130–400)
PMV BLD AUTO: 13 FL (ref 9.4–12.4)
POTASSIUM SERPL-SCNC: 3.7 MEQ/L (ref 3.5–5.2)
PROT SERPL-MCNC: 6.7 G/DL (ref 6.1–8)
PSA SERPL-MCNC: 0.24 NG/ML (ref 0–1)
RBC # BLD AUTO: 4.66 MILL/MM3 (ref 4.7–6.1)
SODIUM SERPL-SCNC: 131 MEQ/L (ref 135–145)
WBC # BLD AUTO: 6.1 THOU/MM3 (ref 4.8–10.8)

## 2025-01-29 PROCEDURE — 80053 COMPREHEN METABOLIC PANEL: CPT

## 2025-01-29 PROCEDURE — 83036 HEMOGLOBIN GLYCOSYLATED A1C: CPT

## 2025-01-29 PROCEDURE — 85025 COMPLETE CBC W/AUTO DIFF WBC: CPT

## 2025-01-29 PROCEDURE — 82248 BILIRUBIN DIRECT: CPT

## 2025-01-29 PROCEDURE — G0103 PSA SCREENING: HCPCS

## 2025-01-29 PROCEDURE — 36415 COLL VENOUS BLD VENIPUNCTURE: CPT

## 2025-01-29 PROCEDURE — 82043 UR ALBUMIN QUANTITATIVE: CPT

## 2025-02-05 ENCOUNTER — CARE COORDINATION (OUTPATIENT)
Dept: CARE COORDINATION | Age: 70
End: 2025-02-05

## 2025-02-05 NOTE — CARE COORDINATION
Ambulatory Care Coordination Note     2025 3:07 PM     Patient Current Location:  [place]     ACM contacted the patient by telephone. Verified name and  with patient as identifiers.         ACM: Nia Hoff RN     Challenges to be reviewed by the provider   Additional needs identified to be addressed with provider Yes    Pt scheduled for AWV tomorrow.  Pt shared he has been having some increased side effects s/p recent immunotherapy treatments at [facility] for his liver.          Method of communication with provider: chart routing.    Utilization: Patient has not had any utilization since our last call.     Care Summary Note: Patient was called for continued Care Coordination follow up and education re: the management of his Hypertension, DM, liver disease, and healthcare needs.  Patient shared he has been doing \"ok\" but has not been feeling as well recently d/t side effects from recent immunotherapy treatments at [facility].  Patient reported he has been in contact with [facility] and he was also encouraged to discuss side effects and concerns with PCP at his AWV tomorrow.  Patient verbalized understanding.  Patient denied any c/o hypoglycemia or hyperglycemia symptoms being present.  Patient shared BS have been stable, but he does not always routinely monitor.  ACM reviewed the importance of routine BS monitoring and the importance of keeping BS controlled and DM managed to prevent the development/worsening of complications.  Patient verbalized understanding.  Patient was encouraged to write down any questions/concerns and bring with him to his appointment tomorrow as well as his current medications.  Patient verbalized understanding.  Healthcare Decision Maker information was also reviewed and verified with patient and he was encouraged to bring copy to PCP appointment tomorrow so it can be placed into his chart.  Patient verbalized understanding.  ACP note completed.  Patient denied any other questions, concerns, or needs

## 2025-02-05 NOTE — ACP (ADVANCE CARE PLANNING)
Advance Care Planning   Healthcare Decision Maker:    Primary Decision Maker: Blanca Troncoso - [facility] - [phone]      Today we reviewed and verified Healthcare Decision Maker information.  Patient will bring copy with him to AWV scheduled for tomorrow so it can be placed into his chart.

## 2025-02-15 DIAGNOSIS — E78.2 MIXED HYPERLIPIDEMIA: ICD-10-CM

## 2025-02-15 DIAGNOSIS — E11.9 TYPE 2 DIABETES MELLITUS WITHOUT COMPLICATION, WITHOUT LONG-TERM CURRENT USE OF INSULIN (HCC): ICD-10-CM

## 2025-02-17 ENCOUNTER — CARE COORDINATION (OUTPATIENT)
Dept: CARE COORDINATION | Age: 70
End: 2025-02-17

## 2025-02-17 RX ORDER — ATORVASTATIN CALCIUM 10 MG/1
10 TABLET, FILM COATED ORAL NIGHTLY
Qty: 90 TABLET | Refills: 3 | Status: SHIPPED | OUTPATIENT
Start: 2025-02-17

## 2025-02-17 NOTE — CARE COORDINATION
Ambulatory Care Coordination Note     2025 2:51 PM     Patient Current Location:  [place]     ACM contacted the patient by telephone. Verified name and  with patient as identifiers.         ACM: Nia Hoff RN     Challenges to be reviewed by the provider   Additional needs identified to be addressed with provider No    none         Method of communication with provider: none.    Utilization: Patient has not had any utilization since our last call.     Care Summary Note: Patient was called for continued Care Coordination follow up and education re: the management of his Hypertension, DM, liver disease, and healthcare needs.  Patient shared he has been doing \"ok\" and recent ill symptoms s/p immunotherapy treatment have resolved.  Patient denied any recent hypertension symptoms being present.  Patient admits to only occasionally monitoring BS and BP, but states readings have been stable when he does check them.  Importance of keeping BS and BP stable and controlled to prevent the development/worsening of complications reviewed with patient and patient verbalized understanding.  Zone education and signs and symptoms he should report as well as the importance of early symptom recognition and follow up were also reviewed and patient acknowledged understanding.  ACM reviewed the importance of calling for an earlier appointment with any new or worsening of his symptoms.  Patient again acknowledged understanding.  Patient denied any questions re: recent PCP VV.  Patient denied any other questions, concerns, or needs and he was encouraged to call with any that may develop.      Offered patient enrollment in the Remote Patient Monitoring (RPM) program for in-home monitoring: Yes, but did not enroll at this time: controlled chronic disease management.     Assessments Completed:   No changes since last call    Medications Reviewed:   Patient denies any changes with medications and reports taking all medications as

## 2025-02-24 ENCOUNTER — HOSPITAL ENCOUNTER (OUTPATIENT)
Age: 70
Discharge: HOME | End: 2025-02-24
Payer: MEDICARE

## 2025-02-24 LAB
ALBUMIN SERPL BCG-MCNC: 3.3 G/DL (ref 3.5–5.1)
ALP SERPL-CCNC: 321 U/L (ref 38–126)
ALT SERPL W/O P-5'-P-CCNC: 42 U/L (ref 11–66)
ANION GAP SERPL CALC-SCNC: 18 MEQ/L (ref 8–16)
AST SERPL-CCNC: 144 U/L (ref 5–40)
BASOPHILS ABSOLUTE: 0 THOU/MM3 (ref 0–0.1)
BASOPHILS NFR BLD AUTO: 0.5 %
BILIRUB CONJ SERPL-MCNC: 1.3 MG/DL (ref 0–0.2)
BILIRUB SERPL-MCNC: 2.3 MG/DL (ref 0.3–1.2)
BUN SERPL-MCNC: 15 MG/DL (ref 7–22)
CHLORIDE SERPL-SCNC: 93 MEQ/L (ref 98–111)
CO2 SERPL-SCNC: 23 MEQ/L (ref 23–33)
CREAT SERPL-MCNC: 0.8 MG/DL (ref 0.4–1.2)
DEPRECATED RDW RBC AUTO: 58.1 FL (ref 35–45)
EOSINOPHIL NFR BLD AUTO: 3.1 %
EOSINOPHILS ABSOLUTE: 0.2 THOU/MM3 (ref 0–0.4)
ERYTHROCYTE [DISTWIDTH] IN BLOOD BY AUTOMATED COUNT: 17.3 % (ref 11.5–14.5)
GFR SERPL CREATININE-BSD FRML MDRD: > 90 ML/MIN/1.73M2
HCT VFR BLD AUTO: 43.7 % (ref 42–52)
HGB BLD-MCNC: 14.1 GM/DL (ref 14–18)
IMM GRANULOCYTES # BLD AUTO: 0.05 THOU/MM3 (ref 0–0.07)
IMM GRANULOCYTES NFR BLD AUTO: 0.7 %
LYMPHOCYTES ABSOLUTE: 0.7 THOU/MM3 (ref 1–4.8)
LYMPHOCYTES NFR BLD AUTO: 9.8 %
MCH RBC QN AUTO: 30.1 PG (ref 26–33)
MCHC RBC AUTO-ENTMCNC: 32.3 GM/DL (ref 32.2–35.5)
MCV RBC AUTO: 93.4 FL (ref 80–94)
MONOCYTES ABSOLUTE: 0.8 THOU/MM3 (ref 0.4–1.3)
MONOCYTES NFR BLD AUTO: 11.2 %
NEUTROPHILS ABSOLUTE: 5.5 THOU/MM3 (ref 1.8–7.7)
NEUTROPHILS NFR BLD AUTO: 74.7 %
NRBC BLD AUTO-RTO: 0 /100 WBC
PLATELET # BLD AUTO: 150 THOU/MM3 (ref 130–400)
PMV BLD AUTO: 11.8 FL (ref 9.4–12.4)
POTASSIUM SERPL-SCNC: 4.9 MEQ/L (ref 3.5–5.2)
PROT SERPL-MCNC: 6.3 G/DL (ref 6.1–8)
RBC # BLD AUTO: 4.68 MILL/MM3 (ref 4.7–6.1)
SODIUM SERPL-SCNC: 134 MEQ/L (ref 135–145)
T4 FREE SERPL-MCNC: 1.83 NG/DL (ref 0.92–1.68)
TSH SERPL DL<=0.005 MIU/L-ACNC: 0.04 UIU/ML (ref 0.4–4.2)
WBC # BLD AUTO: 7.4 THOU/MM3 (ref 4.8–10.8)

## 2025-02-24 PROCEDURE — 84443 ASSAY THYROID STIM HORMONE: CPT

## 2025-02-24 PROCEDURE — 36415 COLL VENOUS BLD VENIPUNCTURE: CPT

## 2025-02-24 PROCEDURE — 85025 COMPLETE CBC W/AUTO DIFF WBC: CPT

## 2025-02-24 PROCEDURE — 84439 ASSAY OF FREE THYROXINE: CPT

## 2025-02-24 PROCEDURE — 80051 ELECTROLYTE PANEL: CPT

## 2025-02-24 PROCEDURE — 84520 ASSAY OF UREA NITROGEN: CPT

## 2025-02-24 PROCEDURE — 80076 HEPATIC FUNCTION PANEL: CPT

## 2025-02-24 PROCEDURE — 82565 ASSAY OF CREATININE: CPT

## 2025-02-25 ENCOUNTER — PATIENT MESSAGE (OUTPATIENT)
Dept: FAMILY MEDICINE CLINIC | Age: 70
End: 2025-02-25

## 2025-02-25 RX ORDER — DAPAGLIFLOZIN 10 MG/1
10 TABLET, FILM COATED ORAL NIGHTLY
Qty: 90 TABLET | Refills: 3 | Status: SHIPPED | OUTPATIENT
Start: 2025-02-25

## 2025-02-25 NOTE — TELEPHONE ENCOUNTER
This medication refill is regarding a electronic request.  Refill requested by patient.    Requested Prescriptions     Pending Prescriptions Disp Refills    dapagliflozin (FARXIGA) 10 MG tablet 90 tablet 3     Sig: Take 1 tablet by mouth nightly       Date of last visit: 11/7/2024  Date of next visit: 5/8/2025  Date of last refill: 02/01/2024  Pharmacy Name: [facility]

## 2025-03-03 ENCOUNTER — HOSPITAL ENCOUNTER (OUTPATIENT)
Dept: ULTRASOUND IMAGING | Age: 70
Discharge: HOME | End: 2025-03-03
Payer: MEDICARE

## 2025-03-03 DIAGNOSIS — R18.8 ASCITES OF LIVER: ICD-10-CM

## 2025-03-03 DIAGNOSIS — C22.0 HEPATOCELLULAR CARCINOMA (HCC): ICD-10-CM

## 2025-03-03 PROCEDURE — 88305 TISSUE EXAM BY PATHOLOGIST: CPT

## 2025-03-03 PROCEDURE — 88112 CYTOPATH CELL ENHANCE TECH: CPT

## 2025-03-03 PROCEDURE — 49083 ABD PARACENTESIS W/IMAGING: CPT

## 2025-03-04 ENCOUNTER — CARE COORDINATION (OUTPATIENT)
Dept: CARE COORDINATION | Age: 70
End: 2025-03-04

## 2025-03-04 NOTE — CARE COORDINATION
Remote Patient Monitoring (RPM) program for in-home monitoring: Yes, but did not enroll at this time: controlled chronic disease management.     Assessments Completed:   No changes since last call    Medications Reviewed:   Patient denies any changes with medications and reports taking all medications as prescribed.    Advance Care Planning:   Reviewed during previous call      Care Planning:    Goals Addressed                   This Visit's Progress       Care Coordination     Conditions and Symptoms   Improving     I will schedule office visits, as directed by my provider.  I will keep my appointment or reschedule if I have to cancel.  I will notify my provider of any barriers to my plan of care.  I will follow my Zone Management tool to seek urgent or emergent care.  I will notify my provider of any symptoms that indicate a worsening of my condition.    Barriers: overwhelmed by complexity of regimen and lack of education  Plan for overcoming my barriers: Cont to provide ongoing education to patient and monitor for additional resource needs   Confidence: 9/10  Anticipated Goal Completion Date: 4/4/2025                 PCP/Specialist follow up:   Future Appointments         Provider Specialty Dept Phone    5/8/2025 9:30 AM Yamil Solomon MD Family Medicine [phone]    8/7/2025 9:30 AM Yamil Solomon MD Family Medicine [phone]            Follow Up:   Plan for next AC outreach in approximately 2 weeks to complete:  - Disease specific assessments - DM, Hypertension, and Liver cancer/disease  - Advance care planning - Review Completed   - Goal progression  - Education   - RPM - stable - Will monitor for changes/need for enrollment  - Monitor for additional needs   - Monitor for readiness to discharge from Care Coordination     Patient  is agreeable to this plan.

## 2025-03-05 ENCOUNTER — HOSPITAL ENCOUNTER (OUTPATIENT)
Age: 70
Discharge: HOME | End: 2025-03-05
Payer: MEDICARE

## 2025-03-05 LAB
ALBUMIN SERPL BCG-MCNC: 3.3 G/DL (ref 3.4–4.9)
ALP SERPL-CCNC: 367 U/L (ref 40–129)
ALT SERPL W/O P-5'-P-CCNC: 52 U/L (ref 10–50)
AST SERPL-CCNC: 238 U/L (ref 10–50)
BILIRUB CONJ SERPL-MCNC: 1.3 MG/DL (ref 0–0.2)
BILIRUB SERPL-MCNC: 2.2 MG/DL (ref 0.3–1.2)
PROT SERPL-MCNC: 6.4 G/DL (ref 6.4–8.3)

## 2025-03-05 PROCEDURE — 36415 COLL VENOUS BLD VENIPUNCTURE: CPT

## 2025-03-05 PROCEDURE — 80076 HEPATIC FUNCTION PANEL: CPT

## 2025-03-10 ENCOUNTER — HOSPITAL ENCOUNTER (OUTPATIENT)
Dept: ULTRASOUND IMAGING | Age: 70
Discharge: HOME | End: 2025-03-10
Payer: MEDICARE

## 2025-03-10 ENCOUNTER — HOSPITAL ENCOUNTER (OUTPATIENT)
Age: 70
Discharge: HOME | End: 2025-03-10
Payer: MEDICARE

## 2025-03-10 DIAGNOSIS — C22.0 HEPATOCELLULAR CARCINOMA (HCC): ICD-10-CM

## 2025-03-10 DIAGNOSIS — R18.8 OTHER ASCITES: ICD-10-CM

## 2025-03-10 LAB
ALBUMIN SERPL BCG-MCNC: 3.2 G/DL (ref 3.4–4.9)
ALP SERPL-CCNC: 337 U/L (ref 40–129)
ALT SERPL W/O P-5'-P-CCNC: 48 U/L (ref 10–50)
AST SERPL-CCNC: 203 U/L (ref 10–50)
BILIRUB CONJ SERPL-MCNC: 1.3 MG/DL (ref 0–0.2)
BILIRUB SERPL-MCNC: 2.1 MG/DL (ref 0.3–1.2)
PROT SERPL-MCNC: 6.3 G/DL (ref 6.4–8.3)

## 2025-03-10 PROCEDURE — 80076 HEPATIC FUNCTION PANEL: CPT

## 2025-03-10 PROCEDURE — 88112 CYTOPATH CELL ENHANCE TECH: CPT

## 2025-03-10 PROCEDURE — 88305 TISSUE EXAM BY PATHOLOGIST: CPT

## 2025-03-10 PROCEDURE — 49083 ABD PARACENTESIS W/IMAGING: CPT

## 2025-03-10 PROCEDURE — 36415 COLL VENOUS BLD VENIPUNCTURE: CPT

## 2025-03-17 ENCOUNTER — HOSPITAL ENCOUNTER (OUTPATIENT)
Age: 70
Discharge: HOME | End: 2025-03-17
Payer: MEDICARE

## 2025-03-17 ENCOUNTER — HOSPITAL ENCOUNTER (OUTPATIENT)
Dept: ULTRASOUND IMAGING | Age: 70
Discharge: HOME | End: 2025-03-17
Payer: MEDICARE

## 2025-03-17 DIAGNOSIS — R18.8 CIRRHOSIS OF LIVER WITH ASCITES, UNSPECIFIED HEPATIC CIRRHOSIS TYPE (HCC): ICD-10-CM

## 2025-03-17 DIAGNOSIS — K74.60 CIRRHOSIS OF LIVER WITH ASCITES, UNSPECIFIED HEPATIC CIRRHOSIS TYPE (HCC): ICD-10-CM

## 2025-03-17 LAB
ALBUMIN SERPL BCG-MCNC: 3.1 G/DL (ref 3.4–4.9)
ALP SERPL-CCNC: 377 U/L (ref 40–129)
ALT SERPL W/O P-5'-P-CCNC: 47 U/L (ref 10–50)
AST SERPL-CCNC: 234 U/L (ref 10–50)
BILIRUB CONJ SERPL-MCNC: 1.8 MG/DL (ref 0–0.2)
BILIRUB SERPL-MCNC: 2.7 MG/DL (ref 0.3–1.2)
PROT SERPL-MCNC: 6.1 G/DL (ref 6.4–8.3)

## 2025-03-17 PROCEDURE — 88112 CYTOPATH CELL ENHANCE TECH: CPT

## 2025-03-17 PROCEDURE — 88305 TISSUE EXAM BY PATHOLOGIST: CPT

## 2025-03-17 PROCEDURE — 36415 COLL VENOUS BLD VENIPUNCTURE: CPT

## 2025-03-17 PROCEDURE — 49083 ABD PARACENTESIS W/IMAGING: CPT

## 2025-03-17 PROCEDURE — 80076 HEPATIC FUNCTION PANEL: CPT

## 2025-03-19 ENCOUNTER — CARE COORDINATION (OUTPATIENT)
Dept: CARE COORDINATION | Age: 70
End: 2025-03-19

## 2025-03-19 NOTE — CARE COORDINATION
Attempted to reach patient for continued Care Coordination follow up and education.  Patient was unavailable at the time of my call, and a generic voicemail message was left asking patient to return my call at [phone].

## 2025-03-26 ENCOUNTER — CARE COORDINATION (OUTPATIENT)
Dept: CARE COORDINATION | Age: 70
End: 2025-03-26

## 2025-03-26 NOTE — CARE COORDINATION
Ambulatory Care Coordination Note     3/26/2025 12:02 PM     Patient outreach attempt by this [facility] today to perform care management follow up . [facility] was unable to reach the patient by telephone today;   left voice message requesting a return phone call to this [facility].     ACM: Nia Hoff RN     Care Summary Note: Attempted to reach patient for continued Care Coordination follow up and education re: the management of his Hypertension, DM, liver disease, healthcare needs, and in f/u to recent [facility] hospital visit for c/o severe ascites, increased fatigue, and recent confusion concerns.  Patient was unavailable at the time of [facility] call, and generic voicemail message was left asking patient to please return call to [facility] direct number.  Will continue to work to f/u with patient in the future.     PCP/Specialist follow up:   Future Appointments         Provider Specialty Dept Phone    3/31/2025 10:00 AM (Arrive by 9:45 AM) Ultrasound, Str Radiologist; STR ULTRASOUND RM 1 Radiology [phone]    4/7/2025 10:00 AM (Arrive by 9:45 AM) Ultrasound, Str Radiologist; STR ULTRASOUND RM 1 Radiology [phone]    4/14/2025 10:00 AM (Arrive by 9:45 AM) Ultrasound, Str Radiologist; STR ULTRASOUND RM 1 Radiology [phone]    4/21/2025 10:00 AM (Arrive by 9:45 AM) Ultrasound, Str Radiologist; STR ULTRASOUND RM 1 Radiology [phone]    4/28/2025 10:00 AM (Arrive by 9:45 AM) Ultrasound, Str Radiologist; STR ULTRASOUND RM 1 Radiology [phone]    5/5/2025 10:00 AM (Arrive by 9:45 AM) Ultrasound, Str Radiologist; STR ULTRASOUND RM 1 Radiology [phone]    5/8/2025 9:30 AM Yamil Solomon MD Family Medicine [phone]    5/12/2025 10:00 AM (Arrive by 9:45 AM) Ultrasound, Str Radiologist; STR ULTRASOUND RM 1 Radiology [phone]    5/19/2025 10:00 AM (Arrive by 9:45 AM) Ultrasound, Str Radiologist; STR ULTRASOUND RM 1 Radiology [phone]    5/26/2025 10:00 AM (Arrive by 9:45 AM) Ultrasound, Str Radiologist; STR ULTRASOUND

## 2025-03-31 ENCOUNTER — CARE COORDINATION (OUTPATIENT)
Dept: CARE COORDINATION | Age: 70
End: 2025-03-31

## 2025-03-31 NOTE — CARE COORDINATION
Ambulatory Care Coordination Note     3/31/2025 1:23 PM     Patient outreach attempt by this ACM today to perform care management follow up . ACM was unable to reach the patient by telephone today;   left voice message requesting a return phone call to this ACM.  [employer] message sent requesting patient to contact this ACM.     ACM: Nia Hoff RN     Care Summary Note: Attempted to reach patient for continued Care Coordination follow up and education re: the management of his Hypertension, DM, Liver Disease, and healthcare needs.  Will continue to work to f/u with patient in the future.     PCP/Specialist follow up:   Future Appointments         Provider Specialty Dept Phone    4/7/2025 10:00 AM (Arrive by 9:45 AM) Ultrasound, Str Radiologist; STR ULTRASOUND RM 1 Radiology [phone]    4/14/2025 10:00 AM (Arrive by 9:45 AM) Ultrasound, Str Radiologist; STR ULTRASOUND RM 1 Radiology [phone]    4/21/2025 10:00 AM (Arrive by 9:45 AM) Ultrasound, Str Radiologist; STR ULTRASOUND RM 1 Radiology [phone]    4/28/2025 10:00 AM (Arrive by 9:45 AM) Ultrasound, Str Radiologist; STR ULTRASOUND RM 1 Radiology [phone]    5/5/2025 10:00 AM (Arrive by 9:45 AM) Ultrasound, Str Radiologist; STR ULTRASOUND RM 1 Radiology [phone]    5/8/2025 9:30 AM Yamil Solomon MD Family Medicine [phone]    5/12/2025 10:00 AM (Arrive by 9:45 AM) Ultrasound, Str Radiologist; STR ULTRASOUND RM 1 Radiology [phone]    5/19/2025 10:00 AM (Arrive by 9:45 AM) Ultrasound, Str Radiologist; STR ULTRASOUND RM 1 Radiology [phone]    5/26/2025 10:00 AM (Arrive by 9:45 AM) Ultrasound, Str Radiologist; STR ULTRASOUND RM 1 Radiology [phone]    6/2/2025 10:00 AM (Arrive by 9:45 AM) Ultrasound, Str Radiologist; STR ULTRASOUND RM 1 Radiology [phone]    6/9/2025 10:00 AM (Arrive by 9:45 AM) Ultrasound, Str Radiologist; STR ULTRASOUND RM 1 Radiology [phone]    6/16/2025 10:00 AM (Arrive by 9:45 AM) Ultrasound,

## 2025-03-31 NOTE — CARE COORDINATION
Ambulatory Care Coordination Note     3/31/2025 2:16 PM     Patient Current Location:  [place]     Patient's wife/HIPAA contact, Blanca,  contacted the ACM by telephone.         ACM: Nia Hoff RN     Challenges to be reviewed by the provider   Additional needs identified to be addressed with provider Yes    Patient remains admitted at [facility]  and wife shared drain was placed earlier today to assist with comfort and ongoing ascites.  Wife shared they are looking into possible discharge in a couple of days home with hospice vs. IP hospice center (considering [facility]).          Method of communication with provider: chart routing.    Utilization:  Patient currently admitted to [facility].     Care Summary Note: Patient's wife/HIPAA contact, Blanca, returned ACM call from earlier today for continued Care Coordination follow up and education re: the management of his Hypertension, DM, Liver Disease, and healthcare needs.  Blanca shared patient remains admitted at [facility] and had a drain placed earlier today to assist with the management of his ascites.  Blanca reported she is working with [facility] IP  and  as they are anticipating discharge in the \"next couple of days.\"  Blanca shared they are looking into IP hospice facilities vs. Home with hospice.  Wife denied any need for additional resources at this time.  ACM encouraged wife to write down any questions/concerns and discuss them with staff as needed.  Blanca was instructed to call and f/u with any need for additional local resource information and she verbalized understanding.  Blanca denied any other questions, concerns, or needs at this time and she was encouraged to call with any that may develop.       Offered patient enrollment in the Remote Patient Monitoring (RPM) program for in-home monitoring: Patient is not eligible for RPM program because: currently admitted and considering hospice services .     Assessments

## 2025-04-01 PROBLEM — Z51.5 HOSPICE CARE PATIENT: Status: ACTIVE | Noted: 2025-04-01

## 2025-04-01 PROBLEM — Z51.5 END OF LIFE CARE: Status: ACTIVE | Noted: 2025-04-01

## 2025-04-01 PROBLEM — G89.3 CANCER RELATED PAIN: Status: ACTIVE | Noted: 2025-04-01

## 2025-04-04 ENCOUNTER — CARE COORDINATION (OUTPATIENT)
Dept: CARE COORDINATION | Age: 70
End: 2025-04-04

## 2025-04-04 NOTE — CARE COORDINATION
Chart reviewed for continued Care Coordination follow up and education re: the management of his Hypertension, DM, liver cancer, and healthcare needs.  Per chart patient was discharged from [facility] on 4/1 and admitted to [facility] for ongoing hospice care.  Wife verified during conversation earlier this week they were anticipating discharge this week with hospice services closer to home.  Will discharge from Care Coordination at this time d/t hospice and SNF status.  No further f/u planned.  Wife has ACM contact information if needed.

## (undated) DEVICE — SET ADMIN 25ML L117IN PMP MOD CK VLV RLER CLMP 2 SMRTSITE

## (undated) DEVICE — CONMED SCOPE SAVER BITE BLOCK, 20X27 MM: Brand: SCOPE SAVER

## (undated) DEVICE — IV START KIT: Brand: MEDLINE INDUSTRIES, INC.

## (undated) DEVICE — SOLUTION IV 1000ML 0.45% SOD CHL PH 5 INJ USP VIAFLX PLAS

## (undated) DEVICE — 4-PORT MANIFOLD: Brand: NEPTUNE 2

## (undated) DEVICE — SET LNR RED GRN W/ BASE CLEANASCOPE

## (undated) DEVICE — ENDO KIT: Brand: MEDLINE INDUSTRIES, INC.

## (undated) DEVICE — BIOGUARD A/W CLEANING ADAPTER

## (undated) DEVICE — SOLUTION IV IRRIG WATER 1000ML POUR BRL 2F7114

## (undated) DEVICE — TUBING IV STOPCOCK 48 CM 3 W

## (undated) DEVICE — SOLUTION IV IRRIG WATER 500ML POUR BRL ST 2F7113

## (undated) DEVICE — GLOVE ORTHO 8   MSG9480

## (undated) DEVICE — KIT INF CTRL 2OZ LUB TBNG L12FT DBL END BRSH SYR OP4

## (undated) DEVICE — CATHETER ETER IV 22GA L1IN POLYUR STR RADPQ INTROCAN SFTY

## (undated) DEVICE — LIGATOR ENDOSCP DIA8.6-11.5MM MULT DISP SPDBND LIGATOR SUP